# Patient Record
Sex: FEMALE | Race: WHITE | Employment: FULL TIME | URBAN - METROPOLITAN AREA
[De-identification: names, ages, dates, MRNs, and addresses within clinical notes are randomized per-mention and may not be internally consistent; named-entity substitution may affect disease eponyms.]

---

## 2017-12-12 ENCOUNTER — APPOINTMENT (OUTPATIENT)
Dept: MRI IMAGING | Age: 32
End: 2017-12-12
Payer: COMMERCIAL

## 2017-12-12 ENCOUNTER — APPOINTMENT (OUTPATIENT)
Dept: CT IMAGING | Age: 32
End: 2017-12-12
Payer: COMMERCIAL

## 2017-12-12 ENCOUNTER — HOSPITAL ENCOUNTER (OUTPATIENT)
Age: 32
Setting detail: OBSERVATION
Discharge: HOME | End: 2017-12-13
Attending: EMERGENCY MEDICINE | Admitting: EMERGENCY MEDICINE
Payer: COMMERCIAL

## 2017-12-12 DIAGNOSIS — R51.9 ACUTE NONINTRACTABLE HEADACHE, UNSPECIFIED HEADACHE TYPE: Primary | ICD-10-CM

## 2017-12-12 DIAGNOSIS — I95.9 HYPOTENSION, UNSPECIFIED HYPOTENSION TYPE: ICD-10-CM

## 2017-12-12 DIAGNOSIS — R79.89 INCREASED LACTIC ACID LEVEL: ICD-10-CM

## 2017-12-12 LAB
ABSOLUTE EOS #: 0.04 K/UL (ref 0–0.44)
ABSOLUTE IMMATURE GRANULOCYTE: <0.03 K/UL (ref 0–0.3)
ABSOLUTE LYMPH #: 1.75 K/UL (ref 1.1–3.7)
ABSOLUTE MONO #: 0.36 K/UL (ref 0.1–1.2)
ANION GAP SERPL CALCULATED.3IONS-SCNC: 12 MMOL/L (ref 9–17)
BASOPHILS # BLD: 1 % (ref 0–2)
BASOPHILS ABSOLUTE: 0.03 K/UL (ref 0–0.2)
BUN BLDV-MCNC: 9 MG/DL (ref 6–20)
BUN/CREAT BLD: NORMAL (ref 9–20)
CALCIUM SERPL-MCNC: 8.6 MG/DL (ref 8.6–10.4)
CHLORIDE BLD-SCNC: 102 MMOL/L (ref 98–107)
CO2: 25 MMOL/L (ref 20–31)
CREAT SERPL-MCNC: 0.51 MG/DL (ref 0.5–0.9)
DIFFERENTIAL TYPE: NORMAL
EOSINOPHILS RELATIVE PERCENT: 1 % (ref 1–4)
GFR AFRICAN AMERICAN: >60 ML/MIN
GFR NON-AFRICAN AMERICAN: >60 ML/MIN
GFR SERPL CREATININE-BSD FRML MDRD: NORMAL ML/MIN/{1.73_M2}
GFR SERPL CREATININE-BSD FRML MDRD: NORMAL ML/MIN/{1.73_M2}
GLUCOSE BLD-MCNC: 93 MG/DL (ref 70–99)
HCT VFR BLD CALC: 39.6 % (ref 36.3–47.1)
HEMOGLOBIN: 13 G/DL (ref 11.9–15.1)
IMMATURE GRANULOCYTES: 0 %
INR BLD: 0.9
LYMPHOCYTES # BLD: 39 % (ref 24–43)
MCH RBC QN AUTO: 29.7 PG (ref 25.2–33.5)
MCHC RBC AUTO-ENTMCNC: 32.8 G/DL (ref 28.4–34.8)
MCV RBC AUTO: 90.4 FL (ref 82.6–102.9)
MONOCYTES # BLD: 8 % (ref 3–12)
PARTIAL THROMBOPLASTIN TIME: 20.4 SEC (ref 21.3–31.3)
PDW BLD-RTO: 12.7 % (ref 11.8–14.4)
PLATELET # BLD: 179 K/UL (ref 138–453)
PLATELET ESTIMATE: NORMAL
PMV BLD AUTO: 11 FL (ref 8.1–13.5)
POC LACTIC ACID: 0.9 MMOL/L (ref 0.56–1.39)
POC LACTIC ACID: 3.01 MMOL/L (ref 0.56–1.39)
POTASSIUM SERPL-SCNC: 4 MMOL/L (ref 3.7–5.3)
PROTHROMBIN TIME: 9.9 SEC (ref 9.4–12.6)
RBC # BLD: 4.38 M/UL (ref 3.95–5.11)
RBC # BLD: NORMAL 10*6/UL
SEG NEUTROPHILS: 51 % (ref 36–65)
SEGMENTED NEUTROPHILS ABSOLUTE COUNT: 2.27 K/UL (ref 1.5–8.1)
SODIUM BLD-SCNC: 139 MMOL/L (ref 135–144)
TSH SERPL DL<=0.05 MIU/L-ACNC: 4.17 MIU/L (ref 0.3–5)
WBC # BLD: 4.5 K/UL (ref 3.5–11.3)
WBC # BLD: NORMAL 10*3/UL

## 2017-12-12 PROCEDURE — 70546 MR ANGIOGRAPH HEAD W/O&W/DYE: CPT

## 2017-12-12 PROCEDURE — 2580000003 HC RX 258: Performed by: EMERGENCY MEDICINE

## 2017-12-12 PROCEDURE — 85730 THROMBOPLASTIN TIME PARTIAL: CPT

## 2017-12-12 PROCEDURE — 96365 THER/PROPH/DIAG IV INF INIT: CPT

## 2017-12-12 PROCEDURE — 99219 PR INITIAL OBSERVATION CARE/DAY 50 MINUTES: CPT | Performed by: PSYCHIATRY & NEUROLOGY

## 2017-12-12 PROCEDURE — 6370000000 HC RX 637 (ALT 250 FOR IP): Performed by: NURSE PRACTITIONER

## 2017-12-12 PROCEDURE — 83605 ASSAY OF LACTIC ACID: CPT

## 2017-12-12 PROCEDURE — 96372 THER/PROPH/DIAG INJ SC/IM: CPT

## 2017-12-12 PROCEDURE — 70551 MRI BRAIN STEM W/O DYE: CPT

## 2017-12-12 PROCEDURE — 99284 EMERGENCY DEPT VISIT MOD MDM: CPT

## 2017-12-12 PROCEDURE — 84443 ASSAY THYROID STIM HORMONE: CPT

## 2017-12-12 PROCEDURE — 96375 TX/PRO/DX INJ NEW DRUG ADDON: CPT

## 2017-12-12 PROCEDURE — 96376 TX/PRO/DX INJ SAME DRUG ADON: CPT

## 2017-12-12 PROCEDURE — 6360000002 HC RX W HCPCS: Performed by: EMERGENCY MEDICINE

## 2017-12-12 PROCEDURE — G0378 HOSPITAL OBSERVATION PER HR: HCPCS

## 2017-12-12 PROCEDURE — 80048 BASIC METABOLIC PNL TOTAL CA: CPT

## 2017-12-12 PROCEDURE — A9579 GAD-BASE MR CONTRAST NOS,1ML: HCPCS | Performed by: EMERGENCY MEDICINE

## 2017-12-12 PROCEDURE — 96366 THER/PROPH/DIAG IV INF ADDON: CPT

## 2017-12-12 PROCEDURE — 6360000004 HC RX CONTRAST MEDICATION: Performed by: EMERGENCY MEDICINE

## 2017-12-12 PROCEDURE — 6360000002 HC RX W HCPCS: Performed by: STUDENT IN AN ORGANIZED HEALTH CARE EDUCATION/TRAINING PROGRAM

## 2017-12-12 PROCEDURE — 6370000000 HC RX 637 (ALT 250 FOR IP): Performed by: EMERGENCY MEDICINE

## 2017-12-12 PROCEDURE — 70450 CT HEAD/BRAIN W/O DYE: CPT

## 2017-12-12 PROCEDURE — 85610 PROTHROMBIN TIME: CPT

## 2017-12-12 PROCEDURE — 2580000003 HC RX 258: Performed by: STUDENT IN AN ORGANIZED HEALTH CARE EDUCATION/TRAINING PROGRAM

## 2017-12-12 PROCEDURE — 85025 COMPLETE CBC W/AUTO DIFF WBC: CPT

## 2017-12-12 RX ORDER — DEXAMETHASONE SODIUM PHOSPHATE 10 MG/ML
10 INJECTION INTRAMUSCULAR; INTRAVENOUS ONCE
Status: COMPLETED | OUTPATIENT
Start: 2017-12-12 | End: 2017-12-12

## 2017-12-12 RX ORDER — KETOROLAC TROMETHAMINE 15 MG/ML
15 INJECTION, SOLUTION INTRAMUSCULAR; INTRAVENOUS EVERY 6 HOURS
Status: DISPENSED | OUTPATIENT
Start: 2017-12-12 | End: 2017-12-13

## 2017-12-12 RX ORDER — MAGNESIUM SULFATE 1 G/100ML
1 INJECTION INTRAVENOUS
Status: COMPLETED | OUTPATIENT
Start: 2017-12-12 | End: 2017-12-12

## 2017-12-12 RX ORDER — AMITRIPTYLINE HYDROCHLORIDE 25 MG/1
25 TABLET, FILM COATED ORAL NIGHTLY
Status: DISCONTINUED | OUTPATIENT
Start: 2017-12-19 | End: 2017-12-13 | Stop reason: HOSPADM

## 2017-12-12 RX ORDER — METOCLOPRAMIDE HYDROCHLORIDE 5 MG/ML
10 INJECTION INTRAMUSCULAR; INTRAVENOUS EVERY 6 HOURS PRN
Status: DISCONTINUED | OUTPATIENT
Start: 2017-12-12 | End: 2017-12-13 | Stop reason: HOSPADM

## 2017-12-12 RX ORDER — DIPHENHYDRAMINE HYDROCHLORIDE 50 MG/ML
25 INJECTION INTRAMUSCULAR; INTRAVENOUS ONCE
Status: COMPLETED | OUTPATIENT
Start: 2017-12-12 | End: 2017-12-12

## 2017-12-12 RX ORDER — SODIUM CHLORIDE 0.9 % (FLUSH) 0.9 %
10 SYRINGE (ML) INJECTION EVERY 12 HOURS SCHEDULED
Status: DISCONTINUED | OUTPATIENT
Start: 2017-12-12 | End: 2017-12-13 | Stop reason: HOSPADM

## 2017-12-12 RX ORDER — SODIUM CHLORIDE 0.9 % (FLUSH) 0.9 %
10 SYRINGE (ML) INJECTION PRN
Status: DISCONTINUED | OUTPATIENT
Start: 2017-12-12 | End: 2017-12-13 | Stop reason: HOSPADM

## 2017-12-12 RX ORDER — METOCLOPRAMIDE HYDROCHLORIDE 5 MG/ML
10 INJECTION INTRAMUSCULAR; INTRAVENOUS ONCE
Status: COMPLETED | OUTPATIENT
Start: 2017-12-12 | End: 2017-12-12

## 2017-12-12 RX ORDER — LEVOTHYROXINE SODIUM 0.05 MG/1
50 TABLET ORAL DAILY
Status: DISCONTINUED | OUTPATIENT
Start: 2017-12-12 | End: 2017-12-13 | Stop reason: HOSPADM

## 2017-12-12 RX ORDER — 0.9 % SODIUM CHLORIDE 0.9 %
1000 INTRAVENOUS SOLUTION INTRAVENOUS ONCE
Status: COMPLETED | OUTPATIENT
Start: 2017-12-12 | End: 2017-12-12

## 2017-12-12 RX ORDER — SUMATRIPTAN 50 MG/1
50 TABLET, FILM COATED ORAL
Status: DISCONTINUED | OUTPATIENT
Start: 2017-12-12 | End: 2017-12-13 | Stop reason: HOSPADM

## 2017-12-12 RX ORDER — LORAZEPAM 2 MG/ML
1 INJECTION INTRAMUSCULAR ONCE
Status: COMPLETED | OUTPATIENT
Start: 2017-12-12 | End: 2017-12-12

## 2017-12-12 RX ORDER — SUMATRIPTAN 6 MG/.5ML
6 INJECTION, SOLUTION SUBCUTANEOUS ONCE
Status: COMPLETED | OUTPATIENT
Start: 2017-12-12 | End: 2017-12-12

## 2017-12-12 RX ORDER — ONDANSETRON 4 MG/1
4 TABLET, FILM COATED ORAL EVERY 8 HOURS PRN
Status: DISCONTINUED | OUTPATIENT
Start: 2017-12-12 | End: 2017-12-13 | Stop reason: HOSPADM

## 2017-12-12 RX ORDER — AMITRIPTYLINE HYDROCHLORIDE 25 MG/1
12.5 TABLET, FILM COATED ORAL NIGHTLY
Status: DISCONTINUED | OUTPATIENT
Start: 2017-12-12 | End: 2017-12-13 | Stop reason: HOSPADM

## 2017-12-12 RX ORDER — KETOROLAC TROMETHAMINE 15 MG/ML
15 INJECTION, SOLUTION INTRAMUSCULAR; INTRAVENOUS EVERY 6 HOURS PRN
Status: DISCONTINUED | OUTPATIENT
Start: 2017-12-12 | End: 2017-12-13 | Stop reason: HOSPADM

## 2017-12-12 RX ORDER — DIPHENHYDRAMINE HYDROCHLORIDE 50 MG/ML
25 INJECTION INTRAMUSCULAR; INTRAVENOUS EVERY 6 HOURS PRN
Status: DISCONTINUED | OUTPATIENT
Start: 2017-12-12 | End: 2017-12-13 | Stop reason: HOSPADM

## 2017-12-12 RX ORDER — METHYLPREDNISOLONE SODIUM SUCCINATE 40 MG/ML
40 INJECTION, POWDER, LYOPHILIZED, FOR SOLUTION INTRAMUSCULAR; INTRAVENOUS EVERY 6 HOURS
Status: DISCONTINUED | OUTPATIENT
Start: 2017-12-12 | End: 2017-12-13 | Stop reason: HOSPADM

## 2017-12-12 RX ORDER — SODIUM CHLORIDE 9 MG/ML
INJECTION, SOLUTION INTRAVENOUS CONTINUOUS
Status: DISCONTINUED | OUTPATIENT
Start: 2017-12-12 | End: 2017-12-13 | Stop reason: HOSPADM

## 2017-12-12 RX ORDER — SODIUM CHLORIDE 0.9 % (FLUSH) 0.9 %
10 SYRINGE (ML) INJECTION 2 TIMES DAILY
Status: DISCONTINUED | OUTPATIENT
Start: 2017-12-12 | End: 2017-12-13 | Stop reason: HOSPADM

## 2017-12-12 RX ORDER — CITALOPRAM 20 MG/1
20 TABLET ORAL DAILY
Status: DISCONTINUED | OUTPATIENT
Start: 2017-12-12 | End: 2017-12-13 | Stop reason: HOSPADM

## 2017-12-12 RX ORDER — ACETAMINOPHEN 325 MG/1
650 TABLET ORAL EVERY 4 HOURS PRN
Status: DISCONTINUED | OUTPATIENT
Start: 2017-12-12 | End: 2017-12-13 | Stop reason: HOSPADM

## 2017-12-12 RX ADMIN — MAGNESIUM SULFATE HEPTAHYDRATE 1 G: 1 INJECTION, SOLUTION INTRAVENOUS at 10:01

## 2017-12-12 RX ADMIN — PROCHLORPERAZINE EDISYLATE 10 MG: 5 INJECTION INTRAMUSCULAR; INTRAVENOUS at 08:33

## 2017-12-12 RX ADMIN — MAGNESIUM SULFATE HEPTAHYDRATE 1 G: 1 INJECTION, SOLUTION INTRAVENOUS at 08:36

## 2017-12-12 RX ADMIN — AMITRIPTYLINE HYDROCHLORIDE 12.5 MG: 25 TABLET, FILM COATED ORAL at 22:43

## 2017-12-12 RX ADMIN — DIPHENHYDRAMINE HYDROCHLORIDE 25 MG: 50 INJECTION, SOLUTION INTRAMUSCULAR; INTRAVENOUS at 08:34

## 2017-12-12 RX ADMIN — GADOPENTETATE DIMEGLUMINE 11 ML: 469.01 INJECTION INTRAVENOUS at 12:24

## 2017-12-12 RX ADMIN — SODIUM CHLORIDE 1000 ML: 9 INJECTION, SOLUTION INTRAVENOUS at 08:41

## 2017-12-12 RX ADMIN — SODIUM CHLORIDE 1000 ML: 9 INJECTION, SOLUTION INTRAVENOUS at 06:34

## 2017-12-12 RX ADMIN — Medication 10 ML: at 20:33

## 2017-12-12 RX ADMIN — METHYLPREDNISOLONE SODIUM SUCCINATE 40 MG: 40 INJECTION, POWDER, FOR SOLUTION INTRAMUSCULAR; INTRAVENOUS at 20:32

## 2017-12-12 RX ADMIN — METOCLOPRAMIDE 10 MG: 5 INJECTION, SOLUTION INTRAMUSCULAR; INTRAVENOUS at 06:33

## 2017-12-12 RX ADMIN — SUMATRIPTAN SUCCINATE 6 MG: 6 INJECTION SUBCUTANEOUS at 06:34

## 2017-12-12 RX ADMIN — DEXAMETHASONE SODIUM PHOSPHATE 10 MG: 10 INJECTION INTRAMUSCULAR; INTRAVENOUS at 08:33

## 2017-12-12 RX ADMIN — DIPHENHYDRAMINE HYDROCHLORIDE 25 MG: 50 INJECTION, SOLUTION INTRAMUSCULAR; INTRAVENOUS at 06:34

## 2017-12-12 RX ADMIN — LORAZEPAM 1 MG: 2 INJECTION INTRAMUSCULAR at 10:51

## 2017-12-12 RX ADMIN — CITALOPRAM 20 MG: 20 TABLET, FILM COATED ORAL at 22:43

## 2017-12-12 RX ADMIN — SODIUM CHLORIDE: 9 INJECTION, SOLUTION INTRAVENOUS at 20:32

## 2017-12-12 ASSESSMENT — PAIN SCALES - GENERAL
PAINLEVEL_OUTOF10: 10
PAINLEVEL_OUTOF10: 0

## 2017-12-12 ASSESSMENT — VISUAL ACUITY
OS: 20/25
OD: 20/20
OU: 20/20

## 2017-12-12 ASSESSMENT — ENCOUNTER SYMPTOMS
EYE ITCHING: 0
RHINORRHEA: 0
DIARRHEA: 0
EYE REDNESS: 0
ABDOMINAL PAIN: 0
VOMITING: 0
NAUSEA: 0
BACK PAIN: 0
COUGH: 0
SHORTNESS OF BREATH: 0

## 2017-12-12 ASSESSMENT — PAIN DESCRIPTION - PAIN TYPE: TYPE: ACUTE PAIN

## 2017-12-12 ASSESSMENT — PAIN DESCRIPTION - LOCATION: LOCATION: HEAD

## 2017-12-12 NOTE — ED NOTES
Pt states migraine is slightly subsiding at this time. NAD noted. Pt alert an oriented.  Will continue to monitor      Bernarda Hebert RN  12/12/17 8020

## 2017-12-12 NOTE — ED NOTES
Pt to ED with c/o migraine since last Wednesday with no relief, pt states taking tylenol. Pt is alert and oriented, pt states now she is nauseas so she decided to come in. Pt states sensitive to light and sound. No emesis noted.  Will continue to monitor      Lori Rosenbaum RN  12/12/17 6879

## 2017-12-12 NOTE — ED PROVIDER NOTES
[facility] ED     Emergency Department     Faculty Attestation        I performed a history and physical examination of the patient and discussed management with the resident. I reviewed the residents note and agree with the documented findings and plan of care. Any areas of disagreement are noted on the chart. I was personally present for the key portions of any procedures. I have documented in the chart those procedures where I was not present during the key portions. I have reviewed the emergency nurses triage note. I agree with the chief complaint, past medical history, past surgical history, allergies, medications, social and family history as documented unless otherwise noted below. For mid-level providers such as nurse practitioners as well as physicians assistants:    I have personally seen and evaluated the patient. I find the patient's history and physical exam are consistent with NP/PA documentation. I agree with the care provided, treatment rendered, disposition, & follow-up plan. Additional findings are as noted. Vital Signs: BP (!) 86/57   Pulse 90   Temp 97.7 °F (36.5 °C) (Oral)   Resp 14   Ht 5' 3\" (1.6 m)   Wt 125 lb (56.7 kg)   LMP 12/08/2017 (Approximate)   SpO2 100%   BMI 22.14 kg/m²   PCP:  Ermelinda Mittal    Pertinent Comments:     Patient presents emergency department for evaluation of a headache it's been present for the past 6-7 days. As gradual in onset and slowly worsened. There is photophobia. She weighs of nausea as well. She has no history of migraines or headaches. There is a family history of aneurysms. She denies any anticoagulation use. There is no head pain or trauma. On exam she appears uncomfortable. She has no focal neurological deficits to suggest stroke. Her neck is soft and supple no meningeal signs.   We'll treat with migraine cocktail, fluids, CT imaging the brain,

## 2017-12-12 NOTE — CONSULTS
NEUROLOGY INPATIENT CONSULT NOTE    12/12/2017         Anali Panda is a  33 y.o. female admitted on 12/12/2017 with  No admission diagnoses are documented for this encounter. History is obtained mostly from the patient and the medical record and from the caregivers. Chart is reviewed and patient is examined. Briefly, this is a  33 y.o. right handed female admitted on 12/12/2017 with new onset headaches x 1 week. Throbbing headaches on left frontal region of head  With no relief on tylenol. Associated with aura as halos and waves, nausea and vomiting, photophobia and phonophobia. Has weakness all over the body. Hx of tingling and numbness in hands and arm. No hx of stroke or seizures in past.     Family History of migraine headaches. No current facility-administered medications on file prior to encounter. Current Outpatient Prescriptions on File Prior to Encounter   Medication Sig Dispense Refill    citalopram (CELEXA) 20 MG tablet       levothyroxine (SYNTHROID) 50 MCG tablet Take 50 mcg by mouth Daily. Allergies: Kasey Marrufo has No Known Allergies. Past Medical History:   Diagnosis Date    Cervical shortening     Hashimoto's disease     Hypoactive thyroid     Pap smear of cervix with ASCUS, cannot exclude HGSIL 3/9/10    last wnl    Pregnant     24 WEEKS       Past Surgical History:   Procedure Laterality Date    CERVICAL CERCLAGE  4-11-15    DILATION AND CURETTAGE      x 2    LEEP  06/21/2010    JUSTINA 2 and 3, HGSIL    OTHER SURGICAL HISTORY  10/1/15    Nexplanon insertion     OTHER SURGICAL HISTORY  10/19/15    skin bx pubic area, rt midline-pigmented seborrheic keratosis     Social History: Kasey Marrufo  reports that she has been smoking Cigarettes. She has a 6.50 pack-year smoking history. She has never used smokeless tobacco. She reports that she drinks alcohol. She reports that she does not use drugs.     Family History   Problem Relation Age of Onset    Thyroid Disease Mother     COPD Father     Emphysema Father     Other Father      double lung transplant    COPD Paternal Grandmother     Cancer Paternal Grandfather      Lung CA       Current Medications:     magnesium sulfate  1 g Intravenous Q1H     PRN Meds include:     ROS:   Constitutional Negative for fever and chills, positive for headaches   HEENT Negative for ear discharge, ear pain, nosebleed   Eyes Negative for photophobia, pain and discharge   Respiratory Negative for hemoptysis and sputum   Cardiovascular Negative for orthopnea, claudication and PND   Gastrointestinal Positive for  nausea and vomiting   Musculoskeletal Negative for joint pain, negative for myalgia   Skin Negative for rash or itching   Endo/heme/allergies Negative for polydipsia, environmental allergy   Psychiatric Negative for suicidal ideation.   Patient is not anxious           Objective:   BP (!) 97/40   Pulse 90   Temp 97.7 °F (36.5 °C) (Oral)   Resp 14   Ht 5' 3\" (1.6 m)   Wt 125 lb (56.7 kg)   LMP 12/08/2017 (Approximate)   SpO2 100%   BMI 22.14 kg/m²     Blood pressure range: Systolic (09YFM), ZLY:41 , Min:86 , ERI:01   ; Diastolic (57AKO), NLP:84, Min:40, Max:57      Continuous infusions:      ON EXAMINATION:  GENERAL  Appears comfortable and in milddistress   HEENT  NC/ AT   NECK  Supple and no bruits heard   Cardiovascular  S1, S2 heard; radial pulse intact   MENTAL STATUS:  Alert, oriented, intact memory, no confusion, normal speech, normal language, no hallucination or delusion   CRANIAL NERVES: II     -       Pupils reactive b/l, Visual fields intact to confrontation  III,IV,VI -  EOMs full, no afferent defect, no                      AMBREEN, no ptosis  V     -     Normal facial sensation  VII    -     Normal facial symmetry  VIII   -     Intact hearing  IX,X -     Symmetrical palate  XI    -     Symmetrical shoulder shrug  XII   -     Midline tongue, no atrophy    MOTOR FUNCTION:  Normal bulk, normal tone, meds.  Also complains of paresthesias in first 3 digits of both hands with nocturnal awakenings with pain and paresthesias. She also has been having few month history of paresthesias in upper extremities suggestive of entrapment neuropathy. CT head, MRI brain and MRV head were unremarkable. Patient refused spinal tap. Impression and Plan: Ms. Raf Rondon is a 33 y.o. 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 Thank you for consultation.       Latisha Ontiveros MD 12/12/2017 5:39 PM

## 2017-12-12 NOTE — ED PROVIDER NOTES
[facility] ED  Emergency Department Encounter  Emergency Medicine Resident     Pt Name: Talon Hay  MRN: [id number]  [place] 1985  Date of evaluation: 12/12/17  PCP:  [address] [place] [address]       Chief Complaint   Patient presents with    Migraine       HISTORY OF PRESENT ILLNESS  (Location/Symptom, Timing/Onset, Context/Setting, Quality, Duration, Modifying Factors, Severity.)      Talon Hay is a 33 y.o. female who presents with Acute onset left sided parietal sharp headache that has been slowly worsening since last week. Patient has never had a headache that is this bad before, never lasted this long. It is associated with nausea and vomiting. There is no blood or bile in the vomit. Associated with photophobia, phonophobia. She does not have a history of headaches, migraines. She has never been seen by neurologist, not had a head CT. She does not have sick contacts, fever, chills, rashes, joint pain, diarrhea. PAST MEDICAL / SURGICAL / SOCIAL / FAMILY HISTORY      has a past medical history of Cervical shortening; Hashimoto's disease; Hypoactive thyroid; Pap smear of cervix with ASCUS, cannot exclude HGSIL; and Pregnant. has a past surgical history that includes LEEP (06/21/2010); Dilation & curettage; Cervical Cerclage (4-11-15); other surgical history (10/1/15); and other surgical history (10/19/15). Social History     Social History    Marital status: Single     Spouse name: N/A    Number of children: N/A    Years of education: N/A     Occupational History    Not on file.      Social History Main Topics    Smoking status: Current Every Day Smoker     Packs/day: 0.50     Years: 13.00     Types: Cigarettes    Smokeless tobacco: Never Used    Alcohol use 0.0 oz/week      Comment: rarely but while pregnant    Drug use: No    Sexual activity: Yes     Partners: Male     Other Topics Concern    Not on file     Social History Narrative  No narrative on file       Family History   Problem Relation Age of Onset    Thyroid Disease Mother    Jessica Hensley COPD Father     Emphysema Father     Other Father      double lung transplant    COPD Paternal Grandmother     Cancer Paternal Grandfather      Lung CA       Allergies:  Review of patient's allergies indicates no known allergies. Home Medications:  Prior to Admission medications    Medication Sig Start Date End Date Taking? Authorizing Provider   citalopram (CELEXA) 20 MG tablet  9/17/15   Historical Provider, MD   levothyroxine (SYNTHROID) 50 MCG tablet Take 50 mcg by mouth Daily. Historical Provider, MD       REVIEW OF SYSTEMS    (2-9 systems for level 4, 10 or more for level 5)      Review of Systems   Constitutional: Negative for chills and fever. HENT: Negative for congestion and rhinorrhea. Eyes: Negative for redness and itching. Respiratory: Negative for cough and shortness of breath. Cardiovascular: Negative for chest pain, palpitations and leg swelling. Gastrointestinal: Negative for abdominal pain, diarrhea, nausea and vomiting. Genitourinary: Negative for dysuria and frequency. Musculoskeletal: Negative for back pain, joint swelling and myalgias. Skin: Negative for pallor and rash. Neurological: Positive for headaches. Negative for dizziness, weakness, light-headedness and numbness. PHYSICAL EXAM   (up to 7 for level 4, 8 or more for level 5)      INITIAL VITALS:   BP (!) 86/57   Pulse 90   Temp 97.7 °F (36.5 °C) (Oral)   Resp 14   Ht 5' 3\" (1.6 m)   Wt 125 lb (56.7 kg)   LMP 12/08/2017 (Approximate)   SpO2 100%   BMI 22.14 kg/m²     Physical Exam   Constitutional: She is oriented to person, place, and time. She appears well-developed and well-nourished. She appears distressed. HENT:   Head: Normocephalic and atraumatic. Eyes: EOM are normal. Pupils are equal, round, and reactive to light.    Cardiovascular: Normal rate, regular rhythm and normal initially, will reassess after first liter. Last documented blood pressure in system is low, 100s over 50s. Of note patient is also hypothyroid, we'll get a TSH. We'll get CBC, BMP, lactic acid as well. Do not feel strongly that this is meningitis at this point, does not have meningismus on exam.  No sick contacts, no rashes. RADIOLOGY:  None    EKG  None    All EKG's are interpreted by the Emergency Department Physician who either signs or Co-signs this chart in the absence of a cardiologist.    [address] [place] [address]:  [phone] at this time I'm going off service patient will be cared for by Dr. Chasity Powers. Outstanding tests include CBC, BMP, lactic acid, CT head, TSH. PROCEDURES:  None    CONSULTS:  None    CRITICAL CARE:  None    FINAL IMPRESSION      HA    DISPOSITION / PLAN     DISPOSITION     PATIENT REFERRED TO:  No follow-up provider specified.     DISCHARGE MEDICATIONS:  New Prescriptions    No medications on file       Althea Fuentes MD  Emergency Medicine Resident    (Please note that portions of this note were completed with a voice recognition program.  Efforts were made to edit the dictations but occasionally words are mis-transcribed.)       Althea Fuentes MD  12/12/17 3173

## 2017-12-12 NOTE — ED PROVIDER NOTES
Contrast    Result Date: 12/12/2017  EXAMINATION: MRI OF THE BRAIN WITHOUT CONTRAST  12/12/2017 12:25 pm TECHNIQUE: Multiplanar multisequence MRI of the brain was performed without the administration of intravenous contrast. COMPARISON: None. HISTORY: ORDERING SYSTEM PROVIDED HISTORY: headache central venous thrombosis Initial evaluation. FINDINGS: INTRACRANIAL STRUCTURES/VENTRICLES: There is no acute infarct. The ventricles and cisternal spaces are normal in size, shape, and configuration for the age of the patient. No areas of abnormal signal are identified in the brain parenchyma. There is no midline shift or mass effect. There are no areas of restricted diffusion. ORBITS: The visualized portion of the orbits demonstrate no acute abnormality. SINUSES:  The visualized paranasal sinuses and mastoid air cells are clear. Incidental note is made of mucous retention cyst or polyp in the right maxillary sinus. BONES/SOFT TISSUES: The bone marrow signal intensity appears normal. The craniocervical junction is normal in appearance. No acute intracranial abnormality. OUTSTANDING TASKS / RECOMMENDATIONS:    1. CTh, CBC, BMP, Lactic Acid, 2nd Bolus of Fluid, TSH     FINAL IMPRESSION:     1. Acute nonintractable headache, unspecified headache type    2. Hypotension, unspecified hypotension type    3.  Increased lactic acid level        DISPOSITION:         DISPOSITION:  []  Discharge   []  Transfer -    [x]  Admission -   ETU   []  Against Medical Advice   []  Eloped   FOLLOW-UP: [address]  [address] [facility]  [phone]           DISCHARGE MEDICATIONS: Current Discharge Medication List             Candy Zamora MD  Emergency Medicine Resident  [address] [facility]        Candy Zamora MD  Resident  12/12/17 5106

## 2017-12-13 VITALS
RESPIRATION RATE: 16 BRPM | HEIGHT: 63 IN | BODY MASS INDEX: 21.32 KG/M2 | HEART RATE: 97 BPM | DIASTOLIC BLOOD PRESSURE: 60 MMHG | TEMPERATURE: 98.4 F | SYSTOLIC BLOOD PRESSURE: 105 MMHG | OXYGEN SATURATION: 97 % | WEIGHT: 120.3 LBS

## 2017-12-13 PROCEDURE — 96376 TX/PRO/DX INJ SAME DRUG ADON: CPT

## 2017-12-13 PROCEDURE — 6370000000 HC RX 637 (ALT 250 FOR IP): Performed by: EMERGENCY MEDICINE

## 2017-12-13 PROCEDURE — 2580000003 HC RX 258: Performed by: EMERGENCY MEDICINE

## 2017-12-13 PROCEDURE — 6360000002 HC RX W HCPCS: Performed by: NURSE PRACTITIONER

## 2017-12-13 PROCEDURE — G0378 HOSPITAL OBSERVATION PER HR: HCPCS

## 2017-12-13 PROCEDURE — 6360000002 HC RX W HCPCS: Performed by: EMERGENCY MEDICINE

## 2017-12-13 PROCEDURE — 96375 TX/PRO/DX INJ NEW DRUG ADDON: CPT

## 2017-12-13 PROCEDURE — 99225 PR SBSQ OBSERVATION CARE/DAY 25 MINUTES: CPT | Performed by: NURSE PRACTITIONER

## 2017-12-13 RX ORDER — SUMATRIPTAN 50 MG/1
TABLET, FILM COATED ORAL
Qty: 9 TABLET | Refills: 1 | Status: SHIPPED | OUTPATIENT
Start: 2017-12-13 | End: 2018-10-15

## 2017-12-13 RX ORDER — PREDNISONE 20 MG/1
TABLET ORAL
Qty: 18 TABLET | Refills: 0 | Status: SHIPPED | OUTPATIENT
Start: 2017-12-14 | End: 2018-10-15 | Stop reason: ALTCHOICE

## 2017-12-13 RX ORDER — AMITRIPTYLINE HYDROCHLORIDE 25 MG/1
TABLET, FILM COATED ORAL
Qty: 33 TABLET | Refills: 3 | Status: SHIPPED | OUTPATIENT
Start: 2017-12-13 | End: 2018-10-15

## 2017-12-13 RX ADMIN — SODIUM CHLORIDE: 9 INJECTION, SOLUTION INTRAVENOUS at 09:22

## 2017-12-13 RX ADMIN — LEVOTHYROXINE SODIUM 50 MCG: 50 TABLET ORAL at 09:31

## 2017-12-13 RX ADMIN — METHYLPREDNISOLONE SODIUM SUCCINATE 40 MG: 40 INJECTION, POWDER, FOR SOLUTION INTRAMUSCULAR; INTRAVENOUS at 16:19

## 2017-12-13 RX ADMIN — METHYLPREDNISOLONE SODIUM SUCCINATE 40 MG: 40 INJECTION, POWDER, FOR SOLUTION INTRAMUSCULAR; INTRAVENOUS at 09:35

## 2017-12-13 RX ADMIN — KETOROLAC TROMETHAMINE 15 MG: 15 INJECTION, SOLUTION INTRAMUSCULAR; INTRAVENOUS at 09:35

## 2017-12-13 RX ADMIN — CITALOPRAM 20 MG: 20 TABLET, FILM COATED ORAL at 09:30

## 2017-12-13 RX ADMIN — METHYLPREDNISOLONE SODIUM SUCCINATE 40 MG: 40 INJECTION, POWDER, FOR SOLUTION INTRAMUSCULAR; INTRAVENOUS at 02:31

## 2017-12-13 ASSESSMENT — PAIN SCALES - GENERAL
PAINLEVEL_OUTOF10: 0
PAINLEVEL_OUTOF10: 0

## 2017-12-13 NOTE — H&P
[facility]  CDU / OBSERVATION eNCOUnter  Resident Note     Pt Name: Rabia Barr  MRN: [id number]  [place] 1985  Date of evaluation: 12/13/17  Patient's PCP is :  [address] [place] [address]       Chief Complaint   Patient presents with    Migraine         HISTORY OF PRESENT ILLNESS    Rabia Barr is a 33 y.o. female who presents With acute onset left-sided parietal headache, which she describes as throbbing in quality. Headaches have been associated with nausea, vomiting, photophobia and phonophobia. Patient denies prior history of headaches. Patient also complaining of generalized visual changes. Patient denies focal neuro deficits including denying numbness, tingling, weakness in extremities. Patient received magnesium in the emergency department. Patient did not have improvement while in the emergency department. Emergency medicine physician contacted neurology who recommending ordering an MRI and MRV. She had a negative CT head workup. Patient refused lumbar puncture as offered by emergency physician for workup for possible sentinel subarachnoid hemorrhage. Patient was admitted to observation unit for further symptomatic control and evaluation by neurology. Patient was seen by myself today in the observation unit floor. Patient stating that her headache is near 100% improved. Patient continues to deny focal neuro symptoms. We'll await final recommendation per neurology.     Location/Symptom: Left-sided parietal headache  Timing/Onset: Several-day history, worsening  Provocation: Photo and phonophobia  Quality: Throbbing  Radiation: None  Severity: Severe  Timing/Duration: Worsening since onset    Modifying Factors: None    REVIEW OF SYSTEMS       General ROS - No fevers, No malaise   Ophthalmic ROS - No discharge, No changes in vision  ENT ROS -  No sore throat, No rhinorrhea,   Respiratory ROS - no shortness of breath, no cough, no wheezing  Cardiovascular ROS - No chest pain, no dyspnea on exertion  Gastrointestinal ROS - No abdominal pain, no nausea or vomiting, no change in bowel habits, no black or bloody stools  Genito-Urinary ROS - No dysuria, trouble voiding, or hematuria  Musculoskeletal ROS - No myalgias, No arthalgias  Neurological ROS - Positive headache, no dizziness/lightheadedness, No focal weakness, no loss of sensation  Dermatological ROS - No lesions, No rash   Psych-no agitation    (PQRS) Advance directives on face sheet per hospital policy. No change unless specifically mentioned in chart    PAST MEDICAL HISTORY    has a past medical history of Cervical shortening; Hashimoto's disease; Hypoactive thyroid; Pap smear of cervix with ASCUS, cannot exclude HGSIL; and Pregnant. I have reviewed the past medical history with the patient and it is pertinent to this complaint. SURGICAL HISTORY      has a past surgical history that includes LEEP (06/21/2010); Dilation & curettage; Cervical Cerclage (4-11-15); other surgical history (10/1/15); and other surgical history (10/19/15).   I have reviewed and agree with Surgical History entered    CURRENT MEDICATIONS         sodium chloride flush 0.9 % injection 10 mL BID   sodium chloride flush 0.9 % injection 10 mL 2 times per day   sodium chloride flush 0.9 % injection 10 mL PRN   citalopram (CELEXA) tablet 20 mg Daily   levothyroxine (SYNTHROID) tablet 50 mcg Daily   0.9 % sodium chloride infusion Continuous   sodium chloride flush 0.9 % injection 10 mL 2 times per day   sodium chloride flush 0.9 % injection 10 mL PRN   acetaminophen (TYLENOL) tablet 650 mg Q4H PRN   ondansetron (ZOFRAN) tablet 4 mg Q8H PRN   enoxaparin (LOVENOX) injection 40 mg Daily   ketorolac (TORADOL) injection 15 mg Q6H PRN   methylPREDNISolone sodium (SOLU-MEDROL) injection 40 mg Q6H   prochlorperazine (COMPAZINE) injection 10 mg Q6H PRN   diphenhydrAMINE (BENADRYL) injection 25 mg Q6H PRN   metoclopramide non-distended with normal active bowel sounds   NEUROLOGIC:  MAEx4, no focal sensory or motor deficits   MUSCULOSKELETAL: no clubbing, cyanosis or edema   SKIN: no rash or wounds       DIFFERENTIAL DIAGNOSIS/MDM:   Headache:  DDX: SAH, subdural hematoma, epidural, intracerebral, meningitis, encephalitis, migraine, tension, cluster, sinusitis, dental, stroke, encephalitis, abscess, CNS mass, increased ICP, venous thrombosis, carbon monoxide poisoning, acute angle closure glaucoma, temporal arteritis, idiopathic intracranial hypertension        DIAGNOSTIC RESULTS       RADIOLOGY:   I directly visualized the following  images and reviewed the radiologist interpretations:    Ct Head Wo Contrast    Result Date: 12/12/2017  EXAMINATION: CT OF THE HEAD WITHOUT CONTRAST  12/12/2017 8:09 am TECHNIQUE: CT of the head was performed without the administration of intravenous contrast. Dose modulation, iterative reconstruction, and/or weight based adjustment of the mA/kV was utilized to reduce the radiation dose to as low as reasonably achievable. COMPARISON: CT head dated 11/18/2008 HISTORY: ORDERING SYSTEM PROVIDED HISTORY: HA, new onset FINDINGS: BRAIN/VENTRICLES: There is no acute intracranial hemorrhage, mass effect or midline shift. No abnormal extra-axial fluid collection. The gray-white differentiation is maintained without evidence of an acute infarct. There is no evidence of hydrocephalus. ORBITS: The visualized portion of the orbits demonstrate no acute abnormality. SINUSES: The visualized paranasal sinuses and mastoid air cells demonstrate no acute abnormality. SOFT TISSUES/SKULL:  No acute abnormality of the visualized skull or soft tissues. No acute intracranial abnormality.      Mrv Head W Wo Contrast    Result Date: 12/12/2017  EXAMINATION: MRA/MRV OF THE HEAD WITH AND WITHOUT CONTRAST  12/12/2017: TECHNIQUE: Multiplanar multisequence MRA/MRV of the head was performed with and without the administration of GAS INCLUDES CALC. BASE EXCESS, SO2   POC G4: LACTIC ACID,BLOOD GAS INCLUDES CALC. BASE EXCESS, SO2   LACTIC ACID,POINT OF CARE         CDU IMPRESSION / Emmanuel Stacy is a 33 y.o. female who presents with     1. Acute left sided/parietal throbbing headache, associated with nausea, vomiting, photophobia, phonophobia. Likely due to migraine headache. Patient stating that her headache has significantly improved from the time she was seen in the emergency department to my evaluation on the observation unit. She now rates her headache a 1 out of 10 in severity, appears to be in no acute distress. Patient is denying focal neuro deficits or visual changes at present.  -Further assessed with CT head/MRV/MRI which were negative for acute intracranial pathology  -Patient treated with Toradol, Reglan, amitriptyline  -Neurology consultation: Recommending discharge and follow-up with Dr. Edmundo Dotson in 6-8 weeks, continue Elavil, Imitrex, and prednisone at discharge. 2.  Acute on chronic hypotension, due to history of hypotension from unknown etiology.  -Treated with normal saline infusion, and monitoring.     IP CONSULT TO NEUROLOGY  IP CONSULT TO NEUROLOGY  IP CONSULT TO NEUROLOGY  · Further workup and evaluation   · Follow up recommendations     · Continue home meds, pain control   · Monitor vitals, labs, imaging     DISPO: pending consults and clinical improvement     CONSULTS:    IP CONSULT TO NEUROLOGY  IP CONSULT TO NEUROLOGY  IP CONSULT TO NEUROLOGY    PROCEDURES:  Not indicated       PATIENT REFERRED TO:    [address]  [address] [facility]  [phone]    Schedule an appointment as soon as possible for a visit in 3 days      Randi Hughes MD  [address]  [phone]    Schedule an appointment as soon as possible for a visit in 2 months        --  Sarah Velasquez,    Emergency Medicine Resident     This dictation was generated by voice recognition computer software. Although all attempts are made to edit the dictation for accuracy, there may be errors in the transcription that are not intended.

## 2017-12-13 NOTE — PROGRESS NOTES
CDU Daily Progress Note  Attending Physician       Pt Name: Jarrett Clark  MRN: [id number]  [place] 1985  Date of evaluation: 12/13/17    I performed a history and physical examination of the patient and discussed management with the resident. I reviewed the residents note and agree with the documented findings and plan of care. Any areas of disagreement are noted on the chart. I was personally present for the key portions of any procedures. I have documented in the chart those procedures where I was not present during the key portions. I have reviewed the emergency nurses triage note. I agree with the chief complaint, past medical history, past surgical history, allergies, medications, social and family history as documented unless otherwise noted below. Documentation of the HPI, Physical Exam and Medical Decision Making performed by medical students or scribes is based on my personal performance of the HPI, PE and MDM. For Physician Assistant/ Nurse Practitioner cases/documentation I have personally evaluated this patient and have completed at least one if not all key elements of the E/M (history, physical exam, and MDM). Additional findings are as noted. The Family History, Social History and Review of Systems are unchanged from the previous day. No significant events overnight. L parietal headache worse over last week, assoc w N/V, photo/phonophobia no bile/blood. CT head neg, MRI/MRV brain neg. Neuro consulted    Headache today is gone. Neurology following.  OK for discharge      Melody Kerns MD  Attending Physician  Critical Decision Unit

## 2017-12-13 NOTE — CARE COORDINATION
Case Management Initial Discharge Plan  Bobbi Asuncion Halsted,         Readmission Risk              Readmission Risk:        0       Age 73 or Greater:  0    Admitted from SNF or Requires Paid or Family Care:  0    Currently has CHF,COPD,ARF,CRI,or is on dialysis:  0    Takes more than 5 Prescription Medications:  0    Takes Digoxin,Insulin,Anticoagulants,Narcotics or ASA/Plavix:  [address] in Past 12 Months:  0    On Disability:  0    Patient Considers own Health:  0            Met with:patient to discuss discharge plans.    Information verified: address, contacts, phone number, , insurance Yes  PCP: Radha Ramos  Date of last visit: year ago but states is in good standings and can make appointment     Insurance Provider: [employer]     Discharge Planning  Current Residence:  Private Residence  Living Arrangements:  Spouse/Significant Other   Home has one stories/minimal  stairs to climb  Support Systems:  Family Members, Spouse/Significant Other, Parent, Friends/Neighbors  Current Services PTA:  None Supplier: none   Patient able to perform ADL's:Independent  DME used to aid ambulation prior to admission: none /during admission none     Potential Assistance Needed:  N/A    Pharmacy: [facility]    Potential Assistance Purchasing Medications:  No  Does patient want to participate in local refill/ meds to beds program?  No    Patient agreeable to home care: No  [place] of choice provided:  n/a      Type of Home Care Services:  None  Patient expects to be discharged to:  home    Prior SNF/Rehab Placement and Facility: no   Agreeable to SNF/Rehab: No  [place] of choice provided: n/a   Evaluation: n/a    Expected Discharge date:  17  Follow Up Appointment: Best Day/ Time: Thursday PM    Transportation provider: family   Transportation arrangements needed for discharge: No    Discharge Plan: Plan to discharge to home independently with family; has follow up Electronically signed by Gal Regalado RN on 12/13/17 at 10:54 AM

## 2017-12-13 NOTE — PROGRESS NOTES
Neurology Nurse Practitioner Progress Note      INTERVAL HISTORY: This is a 33 y.o.  female admitted 12/12/2017 for Headache [R51]. This is a follow-up neurology progress note. The patient was examined and the chart was reviewed. Discussed with the pt & RN. There were no acute events overnight. No new motor, sensory, visual or bulbar symptoms. HPI: Madeline Hanna is a 33 y.o. female with H/O Hashimoto's disease, who was admitted 12/12/2017 for Headache [R51]. Pt came in with c/o new onset severe throbbing L frontal headaches with visual aura (waves & halos), associated with nausea, vomiting, photo/phonophobia for the last 1 week. Also c/o on-going tingling & numbness in both arms & first 3 digits of bilateral hands for the past few months. Reported occasional nocturnal awakenings with pain & paresthesia in UEs.       sodium chloride flush  10 mL Intravenous BID    sodium chloride flush  10 mL Intravenous 2 times per day    citalopram  20 mg Oral Daily    levothyroxine  50 mcg Oral Daily    sodium chloride flush  10 mL Intravenous 2 times per day    enoxaparin  40 mg Subcutaneous Daily    methylPREDNISolone  40 mg Intravenous Q6H    ketorolac  15 mg Intravenous Q6H    amitriptyline  12.5 mg Oral Nightly    [START ON 12/19/2017] amitriptyline  25 mg Oral Nightly       Past Medical History:   Diagnosis Date    Cervical shortening     Hashimoto's disease     Hypoactive thyroid     Pap smear of cervix with ASCUS, cannot exclude HGSIL 3/9/10    last wnl    Pregnant     24 WEEKS       Past Surgical History:   Procedure Laterality Date    CERVICAL CERCLAGE  4-11-15    DILATION AND CURETTAGE      x 2    LEEP  06/21/2010    JUTSINA 2 and 3, HGSIL    OTHER SURGICAL HISTORY  10/1/15    Nexplanon insertion     OTHER SURGICAL HISTORY  10/19/15    skin bx pubic area, rt midline-pigmented seborrheic keratosis       PHYSICAL EXAM:      Blood pressure (!) 95/55, pulse 99, temperature 98.1 °F (36.7 °C), temperature source Oral, resp. rate 20, height 5' 3\" (1.6 m), weight 120 lb 4.8 oz (54.6 kg), last menstrual period 12/08/2017, SpO2 97 %, not currently breastfeeding. Neurological Examination:  Mental status   Alert and oriented; intact memory with no confusion, normal speech & language problems; no hallucinations or delusions   Cranial nerves   II - visual fields intact to confrontation                                        III, IV, VI  extra-ocular muscles full: no AMBREEN, no nystagmus, no ptosis                                                     V - normal facial sensation                                                        VII - normal facial symmetry                                                       VIII - intact hearing                                                                     IX, X - symmetrical palate                                                          XI - symmetrical shoulder shrug                                                 XII - midline tongue without atrophy or fasciculation   Motor function  Normal muscle bulk and tone; normal power 5/5, including fine motor movements   Sensory function Grossly intact   Cerebellar No visible involuntary movements or tremors   Reflex function Intact 2+ DTR and symmetric with no pathologic reflex or Babinski sign   Gait                  Not tested       DATA      Lab Results   Component Value Date    WBC 4.5 12/12/2017    HGB 13.0 12/12/2017    HCT 39.6 12/12/2017     12/12/2017    ALT 13 03/18/2015    AST 19 03/18/2015     12/12/2017    K 4.0 12/12/2017     12/12/2017    CREATININE 0.51 12/12/2017    BUN 9 12/12/2017    CO2 25 12/12/2017    TSH 4.17 12/12/2017    INR 0.9 12/12/2017         CT HEAD (12/12/2017): Unremarkable     MRI BRAIN (12/12/2017): Unremarkable     MRV HEAD (12/12/2017):  Unremarkable                         IMPRESSION & PLAN: 33 y.o.  female admitted  Intractable migrainous (with visual aura) &

## 2017-12-13 NOTE — PLAN OF CARE
Problem: Pain:  Goal: Pain level will decrease  Pain level will decrease   Outcome: Completed Date Met: 12/13/17    Goal: Control of acute pain  Control of acute pain   Outcome: Completed Date Met: 12/13/17    Goal: Control of chronic pain  Control of chronic pain   Outcome: Completed Date Met: 12/13/17

## 2017-12-13 NOTE — DISCHARGE SUMMARY
CDU Discharge Summary        Patient: Nereyda Sanchez  YOB: 1985    MRN: [id number]   Acct: [de-identified]    Primary Care Physician: Annie Lopez    Admit date:  12/12/2017 12/12/2017  5:57 AM  Discharge date:  12/13/2017 12/13/2017  4:39 PM     Discharge Diagnoses:   Acute left-sided/parietal throbbing headache associated nausea, vomiting, photophobia and phonophobia. Likely due to migraine headache. Further assessed with CT head/MRV/MRI, and neurology consultation. Patient was treated with Toradol, Reglan, amitriptyline. Patient will follow-up with  in 6-8 weeks. Acute on chronic hypotension, due to history of hypotension from unknown etiology. Patient was treated with continued monitoring, and normal saline infusion. Patient to follow-up with primary care provider for ongoing management. Discharge Medications:       Norman Posada   Home Medication Instructions WOV:[id number]    Printed on:12/13/17 3773   Medication Information                      amitriptyline (ELAVIL) 25 MG tablet  Take half tab (12.5 mg) nightly x 6 days, then take 1 tab nightly             citalopram (CELEXA) 20 MG tablet               levothyroxine (SYNTHROID) 50 MCG tablet  Take 50 mcg by mouth Daily. predniSONE (DELTASONE) 20 MG tablet  Starting from 12/14, take 3 tabs in the AM x 3 days. Then take 2 tablets x 3 days. Then take 1 tablet x 3 days. SUMAtriptan (IMITREX) 50 MG tablet  Take 1 tab only for severe headache. Can repeat after 2 hours. Max 2 tabs/day or 4 tabs/week.                  Diet:    general diet    Activity:  As tolerated    Follow-up:  Call today/tomorrow for a follow up appointment with Annie Lopez     Consultants: IP CONSULT TO NEUROLOGY  IP CONSULT TO NEUROLOGY  IP CONSULT TO NEUROLOGY    Procedures:      Diagnostic Test: Ct Head Wo Contrast    Result Date: 12/12/2017  EXAMINATION: CT OF THE HEAD WITHOUT CONTRAST  12/12/2017 stating that her headache is near 100% improved. Patient continues to deny focal neuro symptoms. We'll await final recommendation per neurology. Neurology recommending discharge and follow-up with Dr Adriana Kaur in 6-8 weeks. Patient to be discharged home on Elavil, Imitrex and prednisone taper. . Labs and imaging were followed daily. At time of discharge, Judy Osorio was tolerating a PO intake well, passing flatus, urinating adequately, ambulating and had adequate analgesia on oral pain medications. She is medically stable to be discharged. Clinical course has improved. I feel the patient can be safely discharged to home with outpatient follow up. Instructions have been given for the patient to return to the ED for any worsening of the symptoms, including but not limited to increased pain, shortness of breath, weakness, or any deterioration of their current condition . Disposition: Home    Condition: Good      Patient stable and ready for discharge home. I have discussed plan of care with patient and they are in understanding. They were instructed to read discharge paperwork. All of their questions and concerns were addressed.      Patient states that they understand the plan and agree with the plan      Time Spent: Farhad. Ronaldo Warren 70, DO  Emergency Medicine Resident Physician

## 2018-10-15 ENCOUNTER — OFFICE VISIT (OUTPATIENT)
Dept: OBGYN CLINIC | Age: 33
End: 2018-10-15
Payer: COMMERCIAL

## 2018-10-15 VITALS
DIASTOLIC BLOOD PRESSURE: 60 MMHG | WEIGHT: 127.2 LBS | SYSTOLIC BLOOD PRESSURE: 104 MMHG | BODY MASS INDEX: 22.54 KG/M2 | HEIGHT: 63 IN

## 2018-10-15 DIAGNOSIS — Z01.419 ENCOUNTER FOR GYNECOLOGICAL EXAMINATION: Primary | ICD-10-CM

## 2018-10-15 LAB — PAP SMEAR: NORMAL

## 2018-10-15 PROCEDURE — 99395 PREV VISIT EST AGE 18-39: CPT | Performed by: NURSE PRACTITIONER

## 2018-10-15 PROCEDURE — G8484 FLU IMMUNIZE NO ADMIN: HCPCS | Performed by: NURSE PRACTITIONER

## 2018-10-15 ASSESSMENT — ENCOUNTER SYMPTOMS
DIARRHEA: 0
ABDOMINAL PAIN: 0
CONSTIPATION: 0
COUGH: 0
SHORTNESS OF BREATH: 0
ABDOMINAL DISTENTION: 0
BACK PAIN: 0

## 2018-10-17 ENCOUNTER — PROCEDURE VISIT (OUTPATIENT)
Dept: OBGYN CLINIC | Age: 33
End: 2018-10-17
Payer: COMMERCIAL

## 2018-10-17 VITALS
WEIGHT: 127 LBS | SYSTOLIC BLOOD PRESSURE: 112 MMHG | DIASTOLIC BLOOD PRESSURE: 64 MMHG | HEIGHT: 63 IN | BODY MASS INDEX: 22.5 KG/M2

## 2018-10-17 DIAGNOSIS — Z30.46 NEXPLANON REMOVAL: Primary | ICD-10-CM

## 2018-10-17 PROCEDURE — 11982 REMOVE DRUG IMPLANT DEVICE: CPT | Performed by: NURSE PRACTITIONER

## 2018-10-17 NOTE — PROGRESS NOTES
10/01/2018, not currently breastfeeding. PROCEDURE:  The patient was positioned comfortably on our procedure table. She was consented earlier in the appointment and the procedure risk and complications were reviewed. A sterile prep and drape was completed and lidocaine for local anesthetic was utilized. The skin had a small incision just beyond the proximal tip of the insert and utilizing blunt dissection the stylet was grasped and removed. A sterile dressing and steri-strip was applied with a pressure wrap. The patient tolerated the procedure well. Formal restrictions were discussed in detail. She is to notify our office if any swelling, redness, temperature, or limb restriction or numbness. No baths, Pools or Lakes until Follow up. Showers are allowed in 36 hours. She may take Tylenol for any pain. Assessment:   Diagnosis Orders   1. Nexplanon removal  Remove drug implant device     Patient Active Problem List    Diagnosis Date Noted    S/P BMZ 4/11, 4/12 04/11/2015     Priority: High    Right carpal tunnel syndrome     Acute nonintractable headache 12/12/2017    Hypothyroid 02/11/2015     Synthroid 50 mcg/day    Labs have been normal throughout pregnancy. With last one 6/8/15.      H/O BERT (2009) 02/11/2015         Plan:  Return in about 1 month (around 11/17/2018).   Abstain  Barrier recs

## 2018-11-12 ENCOUNTER — INITIAL CONSULT (OUTPATIENT)
Dept: OBGYN CLINIC | Age: 33
End: 2018-11-12
Payer: COMMERCIAL

## 2018-11-12 VITALS
WEIGHT: 127 LBS | DIASTOLIC BLOOD PRESSURE: 70 MMHG | BODY MASS INDEX: 22.5 KG/M2 | HEIGHT: 63 IN | SYSTOLIC BLOOD PRESSURE: 116 MMHG

## 2018-11-12 DIAGNOSIS — Z30.09 FAMILY PLANNING ADVICE: ICD-10-CM

## 2018-11-12 DIAGNOSIS — Z64.1 MULTIPARITY: Primary | ICD-10-CM

## 2018-11-12 PROCEDURE — 4004F PT TOBACCO SCREEN RCVD TLK: CPT | Performed by: OBSTETRICS & GYNECOLOGY

## 2018-11-12 PROCEDURE — 99213 OFFICE O/P EST LOW 20 MIN: CPT | Performed by: OBSTETRICS & GYNECOLOGY

## 2018-11-12 PROCEDURE — G8427 DOCREV CUR MEDS BY ELIG CLIN: HCPCS | Performed by: OBSTETRICS & GYNECOLOGY

## 2018-11-12 PROCEDURE — G8484 FLU IMMUNIZE NO ADMIN: HCPCS | Performed by: OBSTETRICS & GYNECOLOGY

## 2018-11-12 PROCEDURE — G8420 CALC BMI NORM PARAMETERS: HCPCS | Performed by: OBSTETRICS & GYNECOLOGY

## 2018-11-12 RX ORDER — SUMATRIPTAN 50 MG/1
TABLET, FILM COATED ORAL
COMMUNITY
Start: 2017-12-13

## 2018-11-12 NOTE — LETTER
PREOPERATIVE  INSTRUCTIONS for Kasey Davis:    Your procedure, laparoscopic tubal ligation by bipolar partial salpingectomy, has been scheduled for 12/7 at 1 p.m. at [facility]    1) Get your preop labs at [facility] on the day surgery. If you are unable to keep your Pre-op testing appointment, please call them [phone], ext. #[phone]  as soon as possible. Make a list of all your allergies and all the medications that your are currently taking (even over-the-counter and herbal medicines) and take it with you to the hospital both for your pre-op visit and the day of surgery    2) Nothing by mouth after midnight the night before surgery (you don't have to fast before your labs). This includes water, chewing gum, cigarettes or any drugs. If there are medications that you feel you have to take in the morning, ask to speak with the anesthesiologist at your lab visit and get them to approve taking the medication or give you an alternative    3) IF this is to be done outpatient (i.e., you are going home the same day),be sure you have someone to drive you home (even if you only have a local anesthetic). 4) Be at the hospital 2 hours before surgery (meaning, get there by 11 a.m. on 12/7)    5) Do not shave the surgical area    6) Leave all valuables at home. Wear comfortable clothing that is easy to take off and put on. Remove all make-up, especially lipstick and nail polish prior to surgery. 7) Do NOT take aspirin or aspirin-like products for 5-7 days prior to surgery unless your doctor tells you otherwise. Tylenol (Acetominophen), Vicodin, and Percocet are OK to take up to the day of surgery, but DO NOT take them the day of surgery    8) Call the office after you get home to schedule a return appointment if you don't already have one ([phone])  Always feel free to call ([phone]) if you have any worries or concerns.     Joanne Argueta M.D.

## 2018-11-12 NOTE — PROGRESS NOTES
[address] [place] [address] [address]  [facility], [facility]  Phone: [phone]  Fax: [phone]    Pre-operative Orders    [facility]  1985  2018  Primary Care Physician: Wanda Winters    HISTORY & PHYSICAL      2018       HOSPITAL: [facility]    HPI: Celina Hernandez is a 29 y.o. female [id number] who desires permanent sterilization. She signed the Medicaid form on 10/17, so we will schedule her surgery at [facility] on . Multiparity    2. Family planning advice       Patient Active Problem List   Diagnosis    Hypothyroid    H/O LEEP ()    S/P BMZ ,      Acute nonintractable headache    Right carpal tunnel syndrome       Obstetric History       T2      L2     SAB0   TAB0   Ectopic0   Molar0   Multiple0   Live Births2       # Outcome Date GA Lbr Calvin/2nd Weight Sex Delivery Anes PTL Lv   4 Term 07/23/15 39w1d  6 lb 13.5 oz (3.104 kg) M  EPI  RADHA      Apgar1:  8                Apgar5: 9   3 Term 09 39w0d  8 lb 14 oz (4.026 kg) F Vag-Spont   RADHA      Name: Kim Valdes   2 AB            1 AB                 Past Medical History:   Diagnosis Date    Cervical shortening     Hashimoto's disease     Hypoactive thyroid     Pap smear of cervix with ASCUS, cannot exclude HGSIL 2010       Past Surgical History:   Procedure Laterality Date    CERVICAL CERCLAGE  4-11-15    DILATION AND CURETTAGE      x 2    INSERTION OF CONTRACEPTIVE CAPSULE Left 10/01/2015    Nexplanon insertion     LEEP  2010    JUSTINA 2 and 3, HGSIL    OTHER SURGICAL HISTORY  10/19/15    skin bx pubic area, rt midline-pigmented seborrheic keratosis       Social History     Social History    Marital status: Single     Spouse name: N/A    Number of children: N/A    Years of education: N/A     Occupational History    Not on file.      Social History Main Topics    Smoking status: Current Every Day Smoker     Packs/day: 0.50     Years: second surgery and that there may be an incomplete removal of abnormal tissue.

## 2018-11-20 DIAGNOSIS — Z01.419 ENCOUNTER FOR GYNECOLOGICAL EXAMINATION: ICD-10-CM

## 2018-12-10 ENCOUNTER — TELEPHONE (OUTPATIENT)
Dept: OBGYN CLINIC | Age: 33
End: 2018-12-10

## 2018-12-10 NOTE — TELEPHONE ENCOUNTER
Patient called to ask about when she is allowed to go back to work. She said her job is calling and asking for paperwork from the doctor stating when she will be released to go back. Her post op appt isn't until 12/20 and she doesn't want to wait until then to be seen and released to go back to work. She's wondering what to do.

## 2018-12-20 ENCOUNTER — OFFICE VISIT (OUTPATIENT)
Dept: OBGYN CLINIC | Age: 33
End: 2018-12-20
Payer: COMMERCIAL

## 2018-12-20 ENCOUNTER — HOSPITAL ENCOUNTER (OUTPATIENT)
Age: 33
Setting detail: SPECIMEN
Discharge: HOME | End: 2018-12-20
Payer: COMMERCIAL

## 2018-12-20 VITALS
WEIGHT: 128.6 LBS | DIASTOLIC BLOOD PRESSURE: 66 MMHG | SYSTOLIC BLOOD PRESSURE: 100 MMHG | HEIGHT: 63 IN | BODY MASS INDEX: 22.79 KG/M2

## 2018-12-20 DIAGNOSIS — G89.18 POSTOPERATIVE PAIN: ICD-10-CM

## 2018-12-20 DIAGNOSIS — G89.18 POSTOPERATIVE PAIN: Primary | ICD-10-CM

## 2018-12-20 DIAGNOSIS — Z09 POSTOP CHECK: ICD-10-CM

## 2018-12-20 LAB
ABSOLUTE EOS #: 0.14 K/UL (ref 0–0.44)
ABSOLUTE IMMATURE GRANULOCYTE: 0.03 K/UL (ref 0–0.3)
ABSOLUTE LYMPH #: 2.26 K/UL (ref 1.1–3.7)
ABSOLUTE MONO #: 0.63 K/UL (ref 0.1–1.2)
BASOPHILS # BLD: 1 % (ref 0–2)
BASOPHILS ABSOLUTE: 0.05 K/UL (ref 0–0.2)
BILIRUBIN URINE: NEGATIVE
COLOR: ABNORMAL
COMMENT UA: ABNORMAL
DIFFERENTIAL TYPE: NORMAL
EOSINOPHILS RELATIVE PERCENT: 2 % (ref 1–4)
GLUCOSE URINE: NEGATIVE
HCT VFR BLD CALC: 39.9 % (ref 36.3–47.1)
HEMOGLOBIN: 12.9 G/DL (ref 11.9–15.1)
IMMATURE GRANULOCYTES: 0 %
KETONES, URINE: NEGATIVE
LEUKOCYTE ESTERASE, URINE: NEGATIVE
LYMPHOCYTES # BLD: 33 % (ref 24–43)
MCH RBC QN AUTO: 29.9 PG (ref 25.2–33.5)
MCHC RBC AUTO-ENTMCNC: 32.3 G/DL (ref 28.4–34.8)
MCV RBC AUTO: 92.6 FL (ref 82.6–102.9)
MONOCYTES # BLD: 9 % (ref 3–12)
NITRITE, URINE: NEGATIVE
NRBC AUTOMATED: 0 PER 100 WBC
PDW BLD-RTO: 12.7 % (ref 11.8–14.4)
PH UA: 7.5 (ref 5–8)
PLATELET # BLD: 221 K/UL (ref 138–453)
PLATELET ESTIMATE: NORMAL
PMV BLD AUTO: 10.8 FL (ref 8.1–13.5)
PROTEIN UA: NEGATIVE
RBC # BLD: 4.31 M/UL (ref 3.95–5.11)
RBC # BLD: NORMAL 10*6/UL
SEG NEUTROPHILS: 55 % (ref 36–65)
SEGMENTED NEUTROPHILS ABSOLUTE COUNT: 3.65 K/UL (ref 1.5–8.1)
SPECIFIC GRAVITY UA: 1.02 (ref 1–1.03)
TURBIDITY: CLEAR
URINE HGB: NEGATIVE
UROBILINOGEN, URINE: NORMAL
WBC # BLD: 6.8 K/UL (ref 3.5–11.3)
WBC # BLD: NORMAL 10*3/UL

## 2018-12-20 PROCEDURE — G8484 FLU IMMUNIZE NO ADMIN: HCPCS | Performed by: OBSTETRICS & GYNECOLOGY

## 2018-12-20 PROCEDURE — 4004F PT TOBACCO SCREEN RCVD TLK: CPT | Performed by: OBSTETRICS & GYNECOLOGY

## 2018-12-20 PROCEDURE — 99213 OFFICE O/P EST LOW 20 MIN: CPT | Performed by: OBSTETRICS & GYNECOLOGY

## 2018-12-20 PROCEDURE — G8428 CUR MEDS NOT DOCUMENT: HCPCS | Performed by: OBSTETRICS & GYNECOLOGY

## 2018-12-20 PROCEDURE — G8420 CALC BMI NORM PARAMETERS: HCPCS | Performed by: OBSTETRICS & GYNECOLOGY

## 2019-01-03 ENCOUNTER — TELEPHONE (OUTPATIENT)
Dept: OBGYN CLINIC | Age: 34
End: 2019-01-03

## 2021-07-25 ENCOUNTER — APPOINTMENT (OUTPATIENT)
Dept: CT IMAGING | Age: 36
End: 2021-07-25
Payer: MEDICARE

## 2021-07-25 ENCOUNTER — HOSPITAL ENCOUNTER (EMERGENCY)
Age: 36
Discharge: HOME | End: 2021-07-25
Attending: EMERGENCY MEDICINE
Payer: MEDICARE

## 2021-07-25 VITALS
RESPIRATION RATE: 14 BRPM | OXYGEN SATURATION: 100 % | TEMPERATURE: 98.4 F | HEART RATE: 89 BPM | WEIGHT: 120 LBS | BODY MASS INDEX: 21.26 KG/M2 | DIASTOLIC BLOOD PRESSURE: 64 MMHG | SYSTOLIC BLOOD PRESSURE: 97 MMHG | HEIGHT: 63 IN

## 2021-07-25 DIAGNOSIS — S00.83XA CONTUSION OF JAW, INITIAL ENCOUNTER: ICD-10-CM

## 2021-07-25 DIAGNOSIS — T07.XXXA MULTIPLE ABRASIONS: ICD-10-CM
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
## 2021-07-25 DIAGNOSIS — S05.12XA PERIORBITAL CONTUSION OF LEFT EYE, INITIAL ENCOUNTER: ICD-10-CM

## 2021-07-25 PROCEDURE — 72125 CT NECK SPINE W/O DYE: CPT

## 2021-07-25 PROCEDURE — 6370000000 HC RX 637 (ALT 250 FOR IP): Performed by: EMERGENCY MEDICINE

## 2021-07-25 PROCEDURE — 70486 CT MAXILLOFACIAL W/O DYE: CPT

## 2021-07-25 PROCEDURE — 99283 EMERGENCY DEPT VISIT LOW MDM: CPT

## 2021-07-25 PROCEDURE — 70450 CT HEAD/BRAIN W/O DYE: CPT

## 2021-07-25 RX ORDER — TRAMADOL HYDROCHLORIDE 50 MG/1
50 TABLET ORAL EVERY 6 HOURS PRN
Qty: 12 TABLET | Refills: 0 | Status: SHIPPED | OUTPATIENT
Start: 2021-07-25 | End: 2021-07-28

## 2021-07-25 RX ORDER — TRAMADOL HYDROCHLORIDE 50 MG/1
50 TABLET ORAL ONCE
Status: COMPLETED | OUTPATIENT
Start: 2021-07-25 | End: 2021-07-25

## 2021-07-25 RX ORDER — CYCLOBENZAPRINE HCL 10 MG
10 TABLET ORAL 3 TIMES DAILY PRN
Qty: 21 TABLET | Refills: 0 | Status: SHIPPED | OUTPATIENT
Start: 2021-07-25 | End: 2021-08-04

## 2021-07-25 RX ADMIN — TRAMADOL HYDROCHLORIDE 50 MG: 50 TABLET, FILM COATED ORAL at 05:00

## 2021-07-25 ASSESSMENT — PAIN DESCRIPTION - LOCATION
LOCATION: FACE
LOCATION: FACE

## 2021-07-25 ASSESSMENT — ENCOUNTER SYMPTOMS
EYE REDNESS: 0
SHORTNESS OF BREATH: 0
COLOR CHANGE: 0
VOMITING: 0
RHINORRHEA: 0
DIARRHEA: 0
EYE DISCHARGE: 0
NAUSEA: 0
SORE THROAT: 0
COUGH: 0

## 2021-07-25 ASSESSMENT — VISUAL ACUITY: OU: 1

## 2021-07-25 ASSESSMENT — PAIN SCALES - GENERAL
PAINLEVEL_OUTOF10: 10
PAINLEVEL_OUTOF10: 2
PAINLEVEL_OUTOF10: 10
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
## 2021-08-23 ENCOUNTER — APPOINTMENT (OUTPATIENT)
Dept: GENERAL RADIOLOGY | Age: 36
End: 2021-08-23
Payer: MEDICARE

## 2021-08-23 ENCOUNTER — HOSPITAL ENCOUNTER (EMERGENCY)
Age: 36
Discharge: HOME | End: 2021-08-23
Attending: EMERGENCY MEDICINE
Payer: MEDICARE

## 2021-08-23 VITALS
SYSTOLIC BLOOD PRESSURE: 130 MMHG | DIASTOLIC BLOOD PRESSURE: 90 MMHG | TEMPERATURE: 98.2 F | BODY MASS INDEX: 20.55 KG/M2 | OXYGEN SATURATION: 100 % | WEIGHT: 116 LBS | HEART RATE: 96 BPM | RESPIRATION RATE: 16 BRPM | HEIGHT: 63 IN

## 2021-08-23 DIAGNOSIS — M25.512 ACUTE PAIN OF LEFT SHOULDER: Primary | ICD-10-CM

## 2021-08-23 PROCEDURE — 99283 EMERGENCY DEPT VISIT LOW MDM: CPT

## 2021-08-23 PROCEDURE — 73030 X-RAY EXAM OF SHOULDER: CPT

## 2021-08-23 RX ORDER — IBUPROFEN 800 MG/1
800 TABLET ORAL EVERY 8 HOURS PRN
Qty: 30 TABLET | Refills: 0 | Status: SHIPPED | OUTPATIENT
Start: 2021-08-23

## 2021-08-23 RX ORDER — TRAMADOL HYDROCHLORIDE 50 MG/1
50 TABLET ORAL EVERY 6 HOURS PRN
Qty: 12 TABLET | Refills: 0 | Status: SHIPPED | OUTPATIENT
Start: 2021-08-23 | End: 2021-08-26

## 2021-08-23 ASSESSMENT — PAIN SCALES - GENERAL: PAINLEVEL_OUTOF10: 10

## 2021-08-23 ASSESSMENT — PAIN DESCRIPTION - LOCATION: LOCATION: SHOULDER

## 2021-08-23 ASSESSMENT — PAIN DESCRIPTION - ORIENTATION: ORIENTATION: LEFT

## 2021-08-23 ASSESSMENT — PAIN DESCRIPTION - PAIN TYPE: TYPE: ACUTE PAIN
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
## 2023-03-15 ENCOUNTER — HOSPITAL ENCOUNTER (INPATIENT)
Age: 38
LOS: 1 days | Discharge: HOME | DRG: 463 | End: 2023-03-17
Attending: EMERGENCY MEDICINE | Admitting: EMERGENCY MEDICINE
Payer: MEDICAID

## 2023-03-15 ENCOUNTER — APPOINTMENT (OUTPATIENT)
Dept: GENERAL RADIOLOGY | Age: 38
DRG: 463 | End: 2023-03-15
Payer: MEDICAID

## 2023-03-15 DIAGNOSIS — N30.00 ACUTE CYSTITIS WITHOUT HEMATURIA: ICD-10-CM

## 2023-03-15 DIAGNOSIS — D72.829 LEUKOCYTOSIS, UNSPECIFIED TYPE: ICD-10-CM

## 2023-03-15 DIAGNOSIS — N39.0 URINARY TRACT INFECTION WITHOUT HEMATURIA, SITE UNSPECIFIED: ICD-10-CM

## 2023-03-15 DIAGNOSIS — R06.00 DYSPNEA, UNSPECIFIED TYPE: Primary | ICD-10-CM

## 2023-03-15 LAB
ABSOLUTE EOS #: 0 K/UL (ref 0–0.4)
ABSOLUTE IMMATURE GRANULOCYTE: 0.89 K/UL (ref 0–0.3)
ABSOLUTE LYMPH #: 3.25 K/UL (ref 1–4.8)
ABSOLUTE MONO #: 2.36 K/UL (ref 0.1–0.8)
ALBUMIN SERPL-MCNC: 4.6 G/DL (ref 3.5–5.2)
ALBUMIN/GLOBULIN RATIO: 1.6 (ref 1–2.5)
ALP SERPL-CCNC: 86 U/L (ref 35–104)
ALT SERPL-CCNC: 13 U/L (ref 5–33)
ANION GAP SERPL CALCULATED.3IONS-SCNC: 12 MMOL/L (ref 9–17)
AST SERPL-CCNC: 15 U/L
BASOPHILS # BLD: 0 % (ref 0–2)
BASOPHILS ABSOLUTE: 0 K/UL (ref 0–0.2)
BILIRUB DIRECT SERPL-MCNC: <0.1 MG/DL
BILIRUB INDIRECT SERPL-MCNC: ABNORMAL MG/DL (ref 0–1)
BILIRUB SERPL-MCNC: 0.2 MG/DL (ref 0.3–1.2)
BILIRUBIN URINE: NEGATIVE
BUN SERPL-MCNC: 13 MG/DL (ref 6–20)
CALCIUM SERPL-MCNC: 9.3 MG/DL (ref 8.6–10.4)
CASTS UA: NORMAL /LPF (ref 0–8)
CHLORIDE SERPL-SCNC: 101 MMOL/L (ref 98–107)
CO2 SERPL-SCNC: 25 MMOL/L (ref 20–31)
COLOR: YELLOW
CREAT SERPL-MCNC: 0.61 MG/DL (ref 0.5–0.9)
D DIMER BLD IA.RAPID-MCNC: <0.17 MG/L FEU
EOSINOPHILS RELATIVE PERCENT: 0 % (ref 1–4)
EPITHELIAL CELLS UA: NORMAL /HPF (ref 0–5)
GFR SERPL CREATININE-BSD FRML MDRD: >60 ML/MIN/1.73M2
GLUCOSE SERPL-MCNC: 87 MG/DL (ref 70–99)
GLUCOSE UR STRIP.AUTO-MCNC: NEGATIVE MG/DL
HCT VFR BLD AUTO: 38.1 % (ref 36.3–47.1)
HGB BLD-MCNC: 12.8 G/DL (ref 11.9–15.1)
IMMATURE GRANULOCYTES: 3 %
KETONES UR STRIP.AUTO-MCNC: NEGATIVE MG/DL
LACTIC ACID, SEPSIS WHOLE BLOOD: 1.1 MMOL/L (ref 0.5–1.9)
LEUKOCYTE ESTERASE UR QL STRIP.AUTO: ABNORMAL
LYMPHOCYTES # BLD: 11 % (ref 24–44)
MAGNESIUM SERPL-MCNC: 2 MG/DL (ref 1.6–2.6)
MCH RBC QN AUTO: 31.8 PG (ref 25.2–33.5)
MCHC RBC AUTO-ENTMCNC: 33.6 G/DL (ref 28.4–34.8)
MCV RBC AUTO: 94.5 FL (ref 82.6–102.9)
MONOCYTES # BLD: 8 % (ref 1–7)
MORPHOLOGY: NORMAL
NITRITE UR QL STRIP.AUTO: NEGATIVE
NRBC AUTOMATED: 0 PER 100 WBC
PDW BLD-RTO: 13.2 % (ref 11.8–14.4)
PLATELET # BLD AUTO: 380 K/UL (ref 138–453)
PMV BLD AUTO: 9.3 FL (ref 8.1–13.5)
POTASSIUM SERPL-SCNC: 4 MMOL/L (ref 3.7–5.3)
PROT SERPL-MCNC: 7.5 G/DL (ref 6.4–8.3)
PROT UR STRIP.AUTO-MCNC: 7 MG/DL (ref 5–8)
PROT UR STRIP.AUTO-MCNC: ABNORMAL MG/DL
RBC # BLD: 4.03 M/UL (ref 3.95–5.11)
RBC CLUMPS #/AREA URNS AUTO: NORMAL /HPF (ref 0–4)
SARS-COV-2 RDRP RESP QL NAA+PROBE: NOT DETECTED
SEG NEUTROPHILS: 78 % (ref 36–66)
SEGMENTED NEUTROPHILS ABSOLUTE COUNT: 23 K/UL (ref 1.8–7.7)
SODIUM SERPL-SCNC: 138 MMOL/L (ref 135–144)
SPECIFIC GRAVITY UA: 1.02 (ref 1–1.03)
SPECIMEN DESCRIPTION: NORMAL
T4 FREE SERPL-MCNC: 0.95 NG/DL (ref 0.93–1.7)
TROPONIN I SERPL DL<=0.01 NG/ML-MCNC: <6 NG/L (ref 0–14)
TSH SERPL-ACNC: 1.07 UIU/ML (ref 0.3–5)
TURBIDITY: ABNORMAL
URINE HGB: NEGATIVE
UROBILINOGEN, URINE: NORMAL
WBC # BLD AUTO: 29.5 K/UL (ref 3.5–11.3)
WBC UA: NORMAL /HPF (ref 0–5)

## 2023-03-15 PROCEDURE — 93005 ELECTROCARDIOGRAM TRACING: CPT | Performed by: EMERGENCY MEDICINE

## 2023-03-15 PROCEDURE — 87086 URINE CULTURE/COLONY COUNT: CPT

## 2023-03-15 PROCEDURE — 80076 HEPATIC FUNCTION PANEL: CPT

## 2023-03-15 PROCEDURE — G0378 HOSPITAL OBSERVATION PER HR: HCPCS

## 2023-03-15 PROCEDURE — 84439 ASSAY OF FREE THYROXINE: CPT

## 2023-03-15 PROCEDURE — 87040 BLOOD CULTURE FOR BACTERIA: CPT

## 2023-03-15 PROCEDURE — 99285 EMERGENCY DEPT VISIT HI MDM: CPT

## 2023-03-15 PROCEDURE — 6370000000 HC RX 637 (ALT 250 FOR IP): Performed by: EMERGENCY MEDICINE

## 2023-03-15 PROCEDURE — 84484 ASSAY OF TROPONIN QUANT: CPT

## 2023-03-15 PROCEDURE — 2580000003 HC RX 258: Performed by: EMERGENCY MEDICINE

## 2023-03-15 PROCEDURE — 83735 ASSAY OF MAGNESIUM: CPT

## 2023-03-15 PROCEDURE — 85379 FIBRIN DEGRADATION QUANT: CPT

## 2023-03-15 PROCEDURE — 83605 ASSAY OF LACTIC ACID: CPT

## 2023-03-15 PROCEDURE — 80048 BASIC METABOLIC PNL TOTAL CA: CPT

## 2023-03-15 PROCEDURE — 96360 HYDRATION IV INFUSION INIT: CPT

## 2023-03-15 PROCEDURE — 87635 SARS-COV-2 COVID-19 AMP PRB: CPT

## 2023-03-15 PROCEDURE — 81001 URINALYSIS AUTO W/SCOPE: CPT

## 2023-03-15 PROCEDURE — 71045 X-RAY EXAM CHEST 1 VIEW: CPT

## 2023-03-15 PROCEDURE — 85025 COMPLETE CBC W/AUTO DIFF WBC: CPT

## 2023-03-15 PROCEDURE — 84443 ASSAY THYROID STIM HORMONE: CPT

## 2023-03-15 PROCEDURE — 36415 COLL VENOUS BLD VENIPUNCTURE: CPT

## 2023-03-15 RX ORDER — SODIUM CHLORIDE 9 MG/ML
INJECTION, SOLUTION INTRAVENOUS PRN
Status: DISCONTINUED | OUTPATIENT
Start: 2023-03-15 | End: 2023-03-17 | Stop reason: HOSPADM

## 2023-03-15 RX ORDER — 0.9 % SODIUM CHLORIDE 0.9 %
1000 INTRAVENOUS SOLUTION INTRAVENOUS ONCE
Status: COMPLETED | OUTPATIENT
Start: 2023-03-15 | End: 2023-03-15

## 2023-03-15 RX ORDER — NITROFURANTOIN 25; 75 MG/1; MG/1
100 CAPSULE ORAL EVERY 12 HOURS SCHEDULED
Status: DISCONTINUED | OUTPATIENT
Start: 2023-03-15 | End: 2023-03-17 | Stop reason: HOSPADM

## 2023-03-15 RX ORDER — FOLIC ACID 1 MG/1
2 TABLET ORAL DAILY
COMMUNITY

## 2023-03-15 RX ORDER — SODIUM CHLORIDE 0.9 % (FLUSH) 0.9 %
5-40 SYRINGE (ML) INJECTION PRN
Status: DISCONTINUED | OUTPATIENT
Start: 2023-03-15 | End: 2023-03-17 | Stop reason: HOSPADM

## 2023-03-15 RX ORDER — POLYETHYLENE GLYCOL 3350 17 G/17G
17 POWDER, FOR SOLUTION ORAL DAILY PRN
Status: DISCONTINUED | OUTPATIENT
Start: 2023-03-15 | End: 2023-03-17 | Stop reason: HOSPADM

## 2023-03-15 RX ORDER — ENOXAPARIN SODIUM 100 MG/ML
40 INJECTION SUBCUTANEOUS DAILY
Status: DISCONTINUED | OUTPATIENT
Start: 2023-03-15 | End: 2023-03-17 | Stop reason: HOSPADM

## 2023-03-15 RX ORDER — ONDANSETRON 2 MG/ML
4 INJECTION INTRAMUSCULAR; INTRAVENOUS EVERY 6 HOURS PRN
Status: DISCONTINUED | OUTPATIENT
Start: 2023-03-15 | End: 2023-03-17 | Stop reason: HOSPADM

## 2023-03-15 RX ORDER — NITROFURANTOIN 25; 75 MG/1; MG/1
100 CAPSULE ORAL ONCE
Status: COMPLETED | OUTPATIENT
Start: 2023-03-15 | End: 2023-03-15

## 2023-03-15 RX ORDER — ACETAMINOPHEN 650 MG/1
650 SUPPOSITORY RECTAL EVERY 6 HOURS PRN
Status: DISCONTINUED | OUTPATIENT
Start: 2023-03-15 | End: 2023-03-17 | Stop reason: HOSPADM

## 2023-03-15 RX ORDER — AMLODIPINE BESYLATE AND ATORVASTATIN CALCIUM 5; 10 MG/1; MG/1
1 TABLET, FILM COATED ORAL DAILY
COMMUNITY

## 2023-03-15 RX ORDER — ACETAMINOPHEN 325 MG/1
650 TABLET ORAL EVERY 6 HOURS PRN
Status: DISCONTINUED | OUTPATIENT
Start: 2023-03-15 | End: 2023-03-17 | Stop reason: HOSPADM

## 2023-03-15 RX ORDER — SODIUM CHLORIDE 0.9 % (FLUSH) 0.9 %
5-40 SYRINGE (ML) INJECTION EVERY 12 HOURS SCHEDULED
Status: DISCONTINUED | OUTPATIENT
Start: 2023-03-15 | End: 2023-03-17 | Stop reason: HOSPADM

## 2023-03-15 RX ORDER — ONDANSETRON 4 MG/1
4 TABLET, ORALLY DISINTEGRATING ORAL EVERY 8 HOURS PRN
Status: DISCONTINUED | OUTPATIENT
Start: 2023-03-15 | End: 2023-03-17 | Stop reason: HOSPADM

## 2023-03-15 RX ADMIN — NITROFURANTOIN MONOHYDRATE/MACROCRYSTALS 100 MG: 75; 25 CAPSULE ORAL at 18:13

## 2023-03-15 RX ADMIN — SODIUM CHLORIDE, PRESERVATIVE FREE 10 ML: 5 INJECTION INTRAVENOUS at 21:14

## 2023-03-15 RX ADMIN — SODIUM CHLORIDE 1000 ML: 9 INJECTION, SOLUTION INTRAVENOUS at 15:26

## 2023-03-15 RX ADMIN — NITROFURANTOIN MONOHYDRATE/MACROCRYSTALLINE 100 MG: 25; 75 CAPSULE ORAL at 21:08

## 2023-03-15 ASSESSMENT — PAIN SCALES - GENERAL: PAINLEVEL_OUTOF10: 0

## 2023-03-15 ASSESSMENT — ENCOUNTER SYMPTOMS
COLOR CHANGE: 0
ABDOMINAL PAIN: 0
NAUSEA: 0
VOMITING: 0
SHORTNESS OF BREATH: 1
COUGH: 0
CHEST TIGHTNESS: 0

## 2023-03-15 ASSESSMENT — PAIN - FUNCTIONAL ASSESSMENT: PAIN_FUNCTIONAL_ASSESSMENT: NONE - DENIES PAIN

## 2023-03-15 NOTE — ED NOTES
Patient ambulated to restroom with a steady gait, writer provided patient with specimen cup     Fidelia Salvage, RN  03/15/23 3201

## 2023-03-15 NOTE — ED TRIAGE NOTES
Pt to ED c/o intermittent shortness of breath and palpitations over the past few days, usually while up moving around but today began happening while at rest. Pt states that position changes do not make her palpitations worse, and states a pain in her chest when the shortness of breath comes on. Pt denies cardiac hx, states hs RA and old dx of hashimoto that has not required medications in over 10 years.

## 2023-03-15 NOTE — ED PROVIDER NOTES
[facility] ED  Emergency Department Encounter  Emergency Medicine Resident     Pt Name:Kasey Arriaga  MRN: [id number]  [place] 1985  Date of evaluation: 3/15/23  PCP:  Harriet Alejandre  Note Started: 2:37 PM EDT      CHIEF COMPLAINT       Chief Complaint   Patient presents with    Shortness of Breath    Palpitations       HISTORY OF PRESENT ILLNESS  (Location/Symptom, Timing/Onset, Context/Setting, Quality, Duration, Modifying Factors, Severity.)      Savannah Berkowitz is a 41 y.o. female with past medical history of rheumatoid arthritis and Hashimoto's (not on any medication for it) who presents with palpitations, dizziness, lightheadedness, shortness of breath. Patient says that this has been going on for the past several days. Normally patient says that the symptoms will come on when she is standing or walking however at this time the symptoms came on when she was sitting at work therefore she was concerned. Patient also states sometimes she will feel flushed in the face. Patient complains of increased urinary frequency however denies dysuria, odor, and difficulty urinating. Patient also denies fever, chest pain, cough, and diarrhea. PAST MEDICAL / SURGICAL / SOCIAL / FAMILY HISTORY      has a past medical history of Cervical shortening, Hashimoto's disease, Hypoactive thyroid, and Pap smear of cervix with ASCUS, cannot exclude HGSIL. has a past surgical history that includes LEEP (06/21/2010); Dilation & curettage; Cervical Cerclage (4-11-15); other surgical history (10/19/15); and Insertion of contraceptive capsule (Left, 10/01/2015).       Social History     Socioeconomic History    Marital status: Single     Spouse name: Not on file    Number of children: Not on file    Years of education: Not on file    Highest education level: Not on file   Occupational History    Not on file   Tobacco Use    Smoking status: Every Day     Packs/day: 0.50     Years: 13.00 Pack years: 6.50     Types: Cigarettes    Smokeless tobacco: Never   Vaping Use    Vaping Use: Never used   Substance and Sexual Activity    Alcohol use: Yes     Alcohol/week: 0.0 standard drinks    Drug use: No    Sexual activity: Yes     Partners: Male     Birth control/protection: Implant   Other Topics Concern    Not on file   Social History Narrative    Not on file     Social Determinants of Health     Financial Resource Strain: Not on file   Food Insecurity: Not on file   Transportation Needs: Not on file   Physical Activity: Not on file   Stress: Not on file   Social Connections: Not on file   Intimate Partner Violence: Not on file   Housing Stability: Not on file       Family History   Problem Relation Age of Onset    Thyroid Disease Mother     COPD Father     Emphysema Father     Other Father         double lung transplant    COPD Paternal Grandmother     Cancer Paternal Grandfather         Lung CA       Allergies:  Patient has no known allergies. Home Medications:  Prior to Admission medications    Medication Sig Start Date End Date Taking? Authorizing Provider   methotrexate (RHEUMATREX) 2.5 MG chemo tablet Take by mouth once a week 2/12/22  Yes Historical Provider, MD   ibuprofen (ADVIL;MOTRIN) 800 MG tablet Take 1 tablet by mouth every 8 hours as needed for Pain 8/23/21   Isabel Monge MD   SUMAtriptan (IMITREX) 50 MG tablet Take 1 tab only for severe headache. Can repeat after 2 hours. Max 2 tabs/day or 4 tabs/week. 12/13/17   Historical Provider, MD       REVIEW OF SYSTEMS       Review of Systems   Constitutional:  Positive for fatigue. Negative for chills and fever. Respiratory:  Positive for shortness of breath. Negative for cough and chest tightness. Cardiovascular:  Negative for chest pain. Gastrointestinal:  Negative for abdominal pain, nausea and vomiting. Genitourinary:  Positive for frequency. Negative for difficulty urinating and dysuria.    Skin:  Negative for color change and rash. Allergic/Immunologic: Negative for environmental allergies and food allergies. Neurological:  Negative for dizziness, weakness, light-headedness and headaches. Psychiatric/Behavioral:  Negative for confusion. PHYSICAL EXAM      INITIAL VITALS:   /70   Pulse 78   Temp 98.8 °F (37.1 °C) (Oral)   Resp 20   Ht 5' 3\" (1.6 m)   Wt 130 lb (59 kg)   SpO2 96%   BMI 23.03 kg/m²     Physical Exam  Constitutional:       Appearance: Normal appearance. HENT:      Head: Normocephalic and atraumatic. Cardiovascular:      Rate and Rhythm: Normal rate and regular rhythm. Pulses: Normal pulses. Heart sounds: Normal heart sounds. Pulmonary:      Effort: Pulmonary effort is normal.      Breath sounds: Normal breath sounds. Abdominal:      General: There is no distension. Palpations: Abdomen is soft. Tenderness: There is no abdominal tenderness. Musculoskeletal:      Right lower leg: No edema. Left lower leg: No edema. Skin:     General: Skin is warm. Capillary Refill: Capillary refill takes less than 2 seconds. Neurological:      General: No focal deficit present. Mental Status: She is alert and oriented to person, place, and time. Psychiatric:         Mood and Affect: Mood normal.         DDX/DIAGNOSTIC RESULTS / EMERGENCY DEPARTMENT COURSE / MDM     Medical Decision Making  Patient was evaluated bedside. EKG and orthostatic was obtained. Patient's heart rate increased by 20 when standing therefore was given 1 L normal saline bolus. Labs obtained include CBC, BMP, mag, troponin, TSH, T4, urinalysis, hepatic function, D-dimer, chest x-ray. Patient's WBC is elevated at 29.5. Patient does state that she takes steroids for her rheumatoid arthritis. Patient last took prednisone 10 mg on Saturday. Patient received an intra-articular steroid injection into her left knee yesterday.   This is unlikely to be the cause for her elevated white blood cell count therefore will try and find infectious cause. Urinalysis was positive for UTI. Patient was started on Macrobid 100 mg twice daily. Given that the patient has leukocytosis with a positive urinary source, palpitations, lightheadedness, and dizziness the decision was made to admit the patient to the observation unit for further work-up of her leukocytosis as well as to see cardiology in the morning for her palpitations, lightheadedness, dizziness. Amount and/or Complexity of Data Reviewed  Labs: ordered. Decision-making details documented in ED Course. Radiology: ordered. Risk  Prescription drug management. Decision regarding hospitalization. EKG  EKG Interpretation    Interpreted by me    Rhythm: Sinus tachycardia  Rate: Tachycardic  Axis: normal  Ectopy: none  Conduction: normal  ST Segments: no acute change  T Waves: no acute change  Q Waves: none    Clinical Impression: no acute changes and normal EKG    All EKG's are interpreted by the Emergency Department Physician who either signs or Co-signs this chart in the absence of a cardiologist.    EMERGENCY DEPARTMENT COURSE:  Patient was evaluated bedside. EKG and orthostatic was obtained. Patient's heart rate increased by 20 when standing therefore was given 1 L normal saline bolus. Labs obtained include CBC, BMP, mag, troponin, TSH, T4, urinalysis, D-dimer, hepatic panel, blood cultures, lactic acid, chest x-ray. Patient also complains of increased fatigue therefore we will also rule out COVID infection. Urine came back positive for UTI. Will obtain blood cultures x2 and lactic acid Will start patient on Macrobid 100mg twice daily. Patient was admitted to the observation unit for further work-up of her leukocytosis as well as to see cardiology in the morning for her palpitations, dizziness, lightheadedness.     ED Course as of 03/15/23 1903   Wed Mar 15, 2023   1650 Bilirubin, Urine: NEGATIVE [MO]      ED Course User Index  [MO] Alanna Berg MD Emiliana         PROCEDURES:  None    CONSULTS:  IP CONSULT TO CARDIOLOGY        FINAL IMPRESSION      1. Dyspnea, unspecified type    2. Urinary tract infection without hematuria, site unspecified    3. Leukocytosis, unspecified type          DISPOSITION / PLAN     DISPOSITION Admitted 03/15/2023 06:28:51 PM      PATIENT REFERRED TO:  No follow-up provider specified.    DISCHARGE MEDICATIONS:  New Prescriptions    No medications on file       Sandi Carlos MD  Emergency Medicine Resident    (Please note that portions of thisnote were completed with a voice recognition program.  Efforts were made to edit the dictations but occasionally words are mis-transcribed.)      Sandi Carlos MD  Resident  03/15/23 7116

## 2023-03-15 NOTE — ED PROVIDER NOTES
Patient signed out by Dr. Shayy Rivers at the completion of his shift. This patient presented with complaints of palpitations, dizziness, lightheadedness, shortness of breath intermittently for the past several days and worse this morning while sitting at work. No fevers or chills. No chest pain. No cough. No nausea vomiting or diarrhea. No abdominal pain. Past medical history significant for thyroiditis and rheumatoid arthritis. I have reviewed the patient's lab results. Note is made of a white count of 29,500 with a significant left shift. Notable pyuria with 20-50 white cells per high-power field. Nasal swab for COVID is negative. Remainder lab work is unremarkable. Note is made of a normal D-dimer. I reviewed the patient's chest x-ray as well as the radiologist interpretation and they are unremarkable.

## 2023-03-15 NOTE — ED PROVIDER NOTES
[facility] ED  eMERGENCY dEPARTMENT eNCOUnter   Attending Attestation     Pt Name: Savannah Berkowitz  MRN: [id number]  [place] 1985  Date of evaluation: 3/15/23       Savannah Berkowitz is a 41 y.o. female who presents with Shortness of Breath and Palpitations      History: Patient presents with sudden onset intermittent shortness of breath, lightheadedness, palpitations. Patient says this been going on for approximately 5 days. Patient has been experiencing urinary frequency for about a month. Patient also says that her bilateral lower extremities feel heavy. No unilateral leg swelling or pain. Patient has no symptoms at this time. Patient has history of Hashimoto's thyroiditis and had a knee injection yesterday. Exam: Heart rate and rhythm are regular. Lungs are clear to auscultation bilaterally. Abdomen soft, nontender. Patient awake alert and acting appropriately. EKG shows sinus tachycardia with rate of 105. Normal axis. No ST elevation or depression. T waves are upright. No blocks arrhythmias. Nonspecific EKG. Concern for possible thyroid issues. Will obtain TSH, will get ACS screening, will get chest x-ray, will get a D-dimer given mild tachycardia. Will consider admission. Patient does have elevated white count, mild UTI. Concern for continuous episodes of presyncope and possible arrhythmia. I performed a history and physical examination of the patient and discussed management with the resident. I reviewed the residents note and agree with the documented findings and plan of care. Any areas of disagreement are noted on the chart. I was personally present for the key portions of any procedures. I have documented in the chart those procedures where I was not present during the key portions. I have personally reviewed all images and agree with the resident's interpretation. I have reviewed the emergency nurses triage note.  I agree with the chief complaint, past medical history, past surgical history, allergies, medications, social and family history as documented unless otherwise noted below. Documentation of the HPI, Physical Exam and Medical Decision Making performed by medical students or scribes is based on my personal performance of the HPI, PE and MDM. For Phys Assistant/ Nurse Practitioner cases/documentation I have had a face to face evaluation of this patient and have completed at least one if not all key elements of the E/M (history, physical exam, and MDM). Additional findings are as noted. For APC cases I have personally evaluated and examined the patient in conjunction with the APC and agree with the treatment plan and disposition of the patient as recorded by the APC.     Jose Maria Sharpe MD  Attending Emergency  Physician        Bell Mathew MD  03/15/23 8286

## 2023-03-15 NOTE — ED NOTES
Xray at bedside     Forrest Cisse, [facility]  03/15/23 9543

## 2023-03-15 NOTE — ED NOTES
ED to inpatient nurses report      Chief Complaint:  Chief Complaint   Patient presents with    Shortness of Breath    Palpitations     Present to ED from: home    MOA:     LOC: alert and orientated to name, place, date  Mobility: Independent  Oxygen Baseline: none    Current needs required: none   Pending ED orders: none  Present condition: stable    Pertinent event(s): none      Mental Status:  Level of Consciousness: Alert (0)    Psych Assessment:   Psychosocial  Psychosocial (WDL): Within Defined Limits  Vital signs   Vitals:    03/15/23 1516 03/15/23 1649 03/15/23 1700 03/15/23 1800   BP:   112/79 109/70   Pulse: 77 87 78    Resp: 16 24 20    Temp:       TempSrc:       SpO2: 96% 96%     Weight:       Height:            Vitals:  Patient Vitals for the past 24 hrs:   BP Temp Temp src Pulse Resp SpO2 Height Weight   03/15/23 1800 109/70 -- -- -- -- -- -- --   03/15/23 1700 112/79 -- -- 78 20 -- -- --   03/15/23 1649 -- -- -- 87 24 96 % -- --   03/15/23 1516 -- -- -- 77 16 96 % -- --   03/15/23 1456 -- -- -- 87 15 96 % -- --   03/15/23 1440 -- -- -- -- -- -- 5' 3\" (1.6 m) 130 lb (59 kg)   03/15/23 1320 120/75 98.8 °F (37.1 °C) Oral (!) 106 20 100 % -- --      Visit Vitals  /70   Pulse 78   Temp 98.8 °F (37.1 °C) (Oral)   Resp 20   Ht 5' 3\" (1.6 m)   Wt 130 lb (59 kg)   SpO2 96%   BMI 23.03 kg/m²        LDAs:   Peripheral IV 03/15/23 Right Antecubital (Active)   Site Assessment Clean, dry & intact 03/15/23 1445   Line Status Brisk blood return;Normal saline locked 03/15/23 1445   Phlebitis Assessment No symptoms 03/15/23 1445   Infiltration Assessment 0 03/15/23 1445   Dressing Status Clean, dry & intact 03/15/23 1445   Dressing Type Transparent 03/15/23 1445       Ambulatory Status:  No data recorded    Diagnosis:  DISPOSITION Admitted 03/15/2023 06:28:51 PM   Final diagnoses:   Dyspnea, unspecified type   Urinary tract infection without hematuria, site unspecified   Leukocytosis, unspecified type Code Status: [unfilled]     Consults:  []  Hospitalist  Completed  [] yes [] no  []  Medicine  Completed  [] yes [] No  []  Cardiology  Completed  [] yes [] No  []  GI   Completed  [] yes [] No  []  Neurology  Completed  [] yes [] No  []  Nephrology Completed  [] yes [] No  []  Vascular  Completed  [] yes [] No   []  Surgery  Completed  [] yes [] No   []  Urology  Completed  [] yes [] No   []  Plastics  Completed  [] yes [] No   []  ENT  Completed  [] yes [] No   []  Other:  Completed  [] yes [] No    Consults:  IP CONSULT TO CARDIOLOGY     Treatment Team:   Treatment Team: Attending Provider: Jacqueline Estrada MD; Resident: Arely De Paz MD; Consulting Physician: Swapna Self MD; Registered Nurse: [place], RN    Treatment:  ED Course as of 03/15/23 1923   Wed Mar 15, 2023   1650 Bilirubin, Urine: NEGATIVE [MO]      ED Course User Index  [MO] Nancy Thibodeaux MD              Skin Assessment:        Pain Score:  Pain Assessment  Pain Assessment: None - Denies Pain      SOCIAL HISTORY       Social History     Socioeconomic History    Marital status: Single     Spouse name: None    Number of children: None    Years of education: None    Highest education level: None   Tobacco Use    Smoking status: Every Day     Packs/day: 0.50     Years: 13.00     Pack years: 6.50     Types: Cigarettes    Smokeless tobacco: Never   Vaping Use    Vaping Use: Never used   Substance and Sexual Activity    Alcohol use: Yes     Alcohol/week: 0.0 standard drinks    Drug use: No    Sexual activity: Yes     Partners: Male     Birth control/protection: Implant       FAMILY HISTORY       Family History   Problem Relation Age of Onset    Thyroid Disease Mother     COPD Father     Emphysema Father     Other Father         double lung transplant    COPD Paternal Grandmother     Cancer Paternal Grandfather         Lung CA       ALLERGIES     Patient has no known allergies.     CURRENT MEDICATIONS       Previous Medications IBUPROFEN (ADVIL;MOTRIN) 800 MG TABLET    Take 1 tablet by mouth every 8 hours as needed for Pain    METHOTREXATE (RHEUMATREX) 2.5 MG CHEMO TABLET    Take by mouth once a week    SUMATRIPTAN (IMITREX) 50 MG TABLET    Take 1 tab only for severe headache. Can repeat after 2 hours. Max 2 tabs/day or 4 tabs/week. Orders Placed This Encounter   Medications    0.9 % sodium chloride bolus    nitrofurantoin (macrocrystal-monohydrate) (MACROBID) capsule 100 mg     Order Specific Question:   Antimicrobial Indications     Answer:   Urinary Tract Infection       SURGICAL HISTORY       Past Surgical History:   Procedure Laterality Date    CERVICAL CERCLAGE  4-11-15    DILATION AND CURETTAGE      x 2    INSERTION OF CONTRACEPTIVE CAPSULE Left 10/01/2015    Nexplanon insertion     LEEP  06/21/2010    JUSTINA 2 and 3, HGSIL    OTHER SURGICAL HISTORY  10/19/15    skin bx pubic area, rt midline-pigmented seborrheic keratosis       PAST MEDICAL HISTORY       Past Medical History:   Diagnosis Date    Cervical shortening     Hashimoto's disease     Hypoactive thyroid     Pap smear of cervix with ASCUS, cannot exclude HGSIL 03/09/2010       Labs:  Labs Reviewed   CBC WITH AUTO DIFFERENTIAL - Abnormal; Notable for the following components:       Result Value    WBC 29.5 (*)     Immature Granulocytes 3 (*)     Seg Neutrophils 78 (*)     Lymphocytes 11 (*)     Monocytes 8 (*)     Eosinophils % 0 (*)     Absolute Immature Granulocyte 0.89 (*)     Segs Absolute 23.00 (*)     Absolute Mono # 2.36 (*)     All other components within normal limits   URINALYSIS WITH REFLEX TO CULTURE - Abnormal; Notable for the following components:    Turbidity UA Cloudy (*)     Protein, UA TRACE (*)     Leukocyte Esterase, Urine SMALL (*)     All other components within normal limits   HEPATIC FUNCTION PANEL - Abnormal; Notable for the following components:     Total Bilirubin 0.2 (*)     All other components within normal limits   COVID-19, RAPID CULTURE, URINE   CULTURE, BLOOD 1   CULTURE, BLOOD 1   BASIC METABOLIC PANEL   MAGNESIUM   TROPONIN   TSH   T4, FREE   MICROSCOPIC URINALYSIS   D-DIMER, QUANTITATIVE   LACTATE, SEPSIS       Electronically signed by Helen Frias RN on 3/15/2023 at 7:23 PM       Helen Frias RN  03/15/23 1923

## 2023-03-16 PROBLEM — D72.829 LEUKOCYTOSIS: Status: ACTIVE | Noted: 2023-03-16

## 2023-03-16 LAB
ABSOLUTE EOS #: 0 K/UL (ref 0–0.44)
ABSOLUTE EOS #: <0.03 K/UL (ref 0–0.44)
ABSOLUTE IMMATURE GRANULOCYTE: 0.35 K/UL (ref 0–0.3)
ABSOLUTE IMMATURE GRANULOCYTE: 0.44 K/UL (ref 0–0.3)
ABSOLUTE LYMPH #: 1.95 K/UL (ref 1.1–3.7)
ABSOLUTE LYMPH #: 3.32 K/UL (ref 1.1–3.7)
ABSOLUTE MONO #: 1.18 K/UL (ref 0.1–1.2)
ABSOLUTE MONO #: 1.99 K/UL (ref 0.1–1.2)
ANION GAP SERPL CALCULATED.3IONS-SCNC: 10 MMOL/L (ref 9–17)
ANION GAP SERPL CALCULATED.3IONS-SCNC: 9 MMOL/L (ref 9–17)
BASOPHILS # BLD: 1 % (ref 0–2)
BASOPHILS # BLD: 1 % (ref 0–2)
BASOPHILS ABSOLUTE: 0.1 K/UL (ref 0–0.2)
BASOPHILS ABSOLUTE: 0.22 K/UL (ref 0–0.2)
BUN SERPL-MCNC: 13 MG/DL (ref 6–20)
BUN SERPL-MCNC: 14 MG/DL (ref 6–20)
CALCIUM SERPL-MCNC: 8.9 MG/DL (ref 8.6–10.4)
CALCIUM SERPL-MCNC: 9.6 MG/DL (ref 8.6–10.4)
CHLORIDE SERPL-SCNC: 101 MMOL/L (ref 98–107)
CHLORIDE SERPL-SCNC: 103 MMOL/L (ref 98–107)
CO2 SERPL-SCNC: 22 MMOL/L (ref 20–31)
CO2 SERPL-SCNC: 26 MMOL/L (ref 20–31)
CREAT SERPL-MCNC: 0.61 MG/DL (ref 0.5–0.9)
CREAT SERPL-MCNC: 0.71 MG/DL (ref 0.5–0.9)
EOSINOPHILS RELATIVE PERCENT: 0 % (ref 1–4)
EOSINOPHILS RELATIVE PERCENT: 0 % (ref 1–4)
GFR SERPL CREATININE-BSD FRML MDRD: >60 ML/MIN/1.73M2
GFR SERPL CREATININE-BSD FRML MDRD: >60 ML/MIN/1.73M2
GLUCOSE SERPL-MCNC: 100 MG/DL (ref 70–99)
GLUCOSE SERPL-MCNC: 114 MG/DL (ref 70–99)
HCT VFR BLD AUTO: 35.9 % (ref 36.3–47.1)
HCT VFR BLD AUTO: 39.6 % (ref 36.3–47.1)
HGB BLD-MCNC: 12.2 G/DL (ref 11.9–15.1)
HGB BLD-MCNC: 13.1 G/DL (ref 11.9–15.1)
IMMATURE GRANULOCYTES: 2 %
IMMATURE GRANULOCYTES: 2 %
LYMPHOCYTES # BLD: 15 % (ref 24–43)
LYMPHOCYTES # BLD: 9 % (ref 24–43)
MAGNESIUM SERPL-MCNC: 2 MG/DL (ref 1.6–2.6)
MCH RBC QN AUTO: 31.7 PG (ref 25.2–33.5)
MCH RBC QN AUTO: 33.9 PG (ref 25.2–33.5)
MCHC RBC AUTO-ENTMCNC: 33.1 G/DL (ref 28.4–34.8)
MCHC RBC AUTO-ENTMCNC: 34 G/DL (ref 28.4–34.8)
MCV RBC AUTO: 95.9 FL (ref 82.6–102.9)
MCV RBC AUTO: 99.7 FL (ref 82.6–102.9)
MICROORGANISM SPEC CULT: NO GROWTH
MONOCYTES # BLD: 6 % (ref 3–12)
MONOCYTES # BLD: 9 % (ref 3–12)
MORPHOLOGY: NORMAL
NRBC AUTOMATED: 0 PER 100 WBC
NRBC AUTOMATED: 0 PER 100 WBC
PDW BLD-RTO: 13.2 % (ref 11.8–14.4)
PDW BLD-RTO: 13.8 % (ref 11.8–14.4)
PLATELET # BLD AUTO: 352 K/UL (ref 138–453)
PLATELET # BLD AUTO: 603 K/UL (ref 138–453)
PMV BLD AUTO: 10.9 FL (ref 8.1–13.5)
PMV BLD AUTO: 9.4 FL (ref 8.1–13.5)
POTASSIUM SERPL-SCNC: 4 MMOL/L (ref 3.7–5.3)
POTASSIUM SERPL-SCNC: 4.4 MMOL/L (ref 3.7–5.3)
RBC # BLD: 3.6 M/UL (ref 3.95–5.11)
RBC # BLD: 4.13 M/UL (ref 3.95–5.11)
SEG NEUTROPHILS: 73 % (ref 36–65)
SEG NEUTROPHILS: 83 % (ref 36–65)
SEGMENTED NEUTROPHILS ABSOLUTE COUNT: 16.13 K/UL (ref 1.5–8.1)
SEGMENTED NEUTROPHILS ABSOLUTE COUNT: 17.99 K/UL (ref 1.5–8.1)
SODIUM SERPL-SCNC: 135 MMOL/L (ref 135–144)
SODIUM SERPL-SCNC: 136 MMOL/L (ref 135–144)
SPECIMEN DESCRIPTION: NORMAL
WBC # BLD AUTO: 21.6 K/UL (ref 3.5–11.3)
WBC # BLD AUTO: 22.1 K/UL (ref 3.5–11.3)

## 2023-03-16 PROCEDURE — 2580000003 HC RX 258: Performed by: STUDENT IN AN ORGANIZED HEALTH CARE EDUCATION/TRAINING PROGRAM

## 2023-03-16 PROCEDURE — 96361 HYDRATE IV INFUSION ADD-ON: CPT

## 2023-03-16 PROCEDURE — 85025 COMPLETE CBC W/AUTO DIFF WBC: CPT

## 2023-03-16 PROCEDURE — 93017 CV STRESS TEST TRACING ONLY: CPT

## 2023-03-16 PROCEDURE — G0378 HOSPITAL OBSERVATION PER HR: HCPCS

## 2023-03-16 PROCEDURE — 83735 ASSAY OF MAGNESIUM: CPT

## 2023-03-16 PROCEDURE — 93242 EXT ECG>48HR<7D RECORDING: CPT

## 2023-03-16 PROCEDURE — 93243 EXT ECG>48HR<7D SCAN A/R: CPT

## 2023-03-16 PROCEDURE — 2580000003 HC RX 258: Performed by: NURSE PRACTITIONER

## 2023-03-16 PROCEDURE — 6370000000 HC RX 637 (ALT 250 FOR IP): Performed by: EMERGENCY MEDICINE

## 2023-03-16 PROCEDURE — 1200000000 HC SEMI PRIVATE

## 2023-03-16 PROCEDURE — 2580000003 HC RX 258: Performed by: EMERGENCY MEDICINE

## 2023-03-16 PROCEDURE — 36415 COLL VENOUS BLD VENIPUNCTURE: CPT

## 2023-03-16 PROCEDURE — 80048 BASIC METABOLIC PNL TOTAL CA: CPT

## 2023-03-16 PROCEDURE — 93005 ELECTROCARDIOGRAM TRACING: CPT | Performed by: EMERGENCY MEDICINE

## 2023-03-16 RX ORDER — NITROGLYCERIN 0.4 MG/1
0.4 TABLET SUBLINGUAL EVERY 5 MIN PRN
Status: DISCONTINUED | OUTPATIENT
Start: 2023-03-16 | End: 2023-03-16 | Stop reason: ALTCHOICE

## 2023-03-16 RX ORDER — SODIUM CHLORIDE 9 MG/ML
INJECTION, SOLUTION INTRAVENOUS CONTINUOUS
Status: DISCONTINUED | OUTPATIENT
Start: 2023-03-16 | End: 2023-03-17 | Stop reason: HOSPADM

## 2023-03-16 RX ORDER — METOPROLOL TARTRATE 5 MG/5ML
5 INJECTION INTRAVENOUS EVERY 5 MIN PRN
Status: DISCONTINUED | OUTPATIENT
Start: 2023-03-16 | End: 2023-03-16 | Stop reason: ALTCHOICE

## 2023-03-16 RX ORDER — ATROPINE SULFATE 0.1 MG/ML
0.5 INJECTION INTRAVENOUS EVERY 5 MIN PRN
Status: DISCONTINUED | OUTPATIENT
Start: 2023-03-16 | End: 2023-03-16 | Stop reason: ALTCHOICE

## 2023-03-16 RX ORDER — ALBUTEROL SULFATE 90 UG/1
2 AEROSOL, METERED RESPIRATORY (INHALATION) PRN
Status: DISCONTINUED | OUTPATIENT
Start: 2023-03-16 | End: 2023-03-16 | Stop reason: ALTCHOICE

## 2023-03-16 RX ORDER — SODIUM CHLORIDE 0.9 % (FLUSH) 0.9 %
5-40 SYRINGE (ML) INJECTION PRN
Status: DISCONTINUED | OUTPATIENT
Start: 2023-03-16 | End: 2023-03-16 | Stop reason: ALTCHOICE

## 2023-03-16 RX ORDER — SODIUM CHLORIDE 9 MG/ML
500 INJECTION, SOLUTION INTRAVENOUS CONTINUOUS PRN
Status: DISCONTINUED | OUTPATIENT
Start: 2023-03-16 | End: 2023-03-16 | Stop reason: ALTCHOICE

## 2023-03-16 RX ADMIN — SODIUM CHLORIDE, PRESERVATIVE FREE 10 ML: 5 INJECTION INTRAVENOUS at 12:09

## 2023-03-16 RX ADMIN — NITROFURANTOIN MONOHYDRATE/MACROCRYSTALLINE 100 MG: 25; 75 CAPSULE ORAL at 08:14

## 2023-03-16 RX ADMIN — SODIUM CHLORIDE, PRESERVATIVE FREE 10 ML: 5 INJECTION INTRAVENOUS at 08:15

## 2023-03-16 RX ADMIN — NITROFURANTOIN MONOHYDRATE/MACROCRYSTALLINE 100 MG: 25; 75 CAPSULE ORAL at 20:04

## 2023-03-16 RX ADMIN — SODIUM CHLORIDE 250 ML: 9 INJECTION, SOLUTION INTRAVENOUS at 12:13

## 2023-03-16 RX ADMIN — SODIUM CHLORIDE: 9 INJECTION, SOLUTION INTRAVENOUS at 17:20

## 2023-03-16 ASSESSMENT — PAIN SCALES - GENERAL: PAINLEVEL_OUTOF10: 0

## 2023-03-16 NOTE — H&P
[facility]  CDU / OBSERVATION ENCOUNTER  ATTENDING NOTE     Pt Name: Malgorzata Valdez  MRN: [id number]  [place] 1985  Date of evaluation: 3/16/23  Patient's PCP is :  [address] [place] [address]       Chief Complaint   Patient presents with    Shortness of Breath    Palpitations         HISTORY OF PRESENT ILLNESS    Malgorzata Valdez is a 41 y.o. female past medical history of hypothyroidism, rheumatoid arthritis, presents to the emergency department with dizziness and lightheadedness. These appear to been precipitated with standing, movement and thus presents to the emergency department for further assessment. She had unremarkable vital signs in the emergency department. She did have a brief episode of mild tachycardia to 107 which did resolve thereafter. BMP, LFT, magnesium, TSH, D-dimer, troponin unremarkable. CBC demonstrating WBC count of 29.5. Normal hemoglobin. Chest x-ray negative. Urinalysis is suggestive of UTI. Was given a dose of Macrobid, 1 L IV fluid. Repeat CBC demonstrating WBC 21.6. Admitted to the observation unit for further assessment. History was obtained in part through review of the ED chart.  When possible, a direct discussion was had with ED nurses, residents, and attendings  REVIEW OF SYSTEMS       General ROS - No fevers, No malaise , +DIzziness  Ophthalmic ROS - No discharge, No changes in vision  ENT ROS -  No sore throat, No rhinorrhea,   Respiratory ROS - no shortness of breath, no cough, no  wheezing  Cardiovascular ROS - No chest pain, no dyspnea on exertion  Gastrointestinal ROS - No abdominal pain, no nausea or vomiting, no change in bowel habits, no black or bloody stools  Genito-Urinary ROS - No dysuria, trouble voiding, or hematuria  Musculoskeletal ROS - No myalgias, No arthalgias  Neurological ROS - No headache, no dizziness/lightheadedness, No focal weakness, no loss of sensation  Dermatological ROS - No lesions, No rash (PQRS) Advance directives on face sheet per hospital policy. No change unless specifically mentioned in chart    PAST MEDICAL HISTORY    has a past medical history of Cervical shortening, Hashimoto's disease, Hypoactive thyroid, and Pap smear of cervix with ASCUS, cannot exclude HGSIL. I have reviewed the past medical history with the patient and it is pertinent to this complaint. SURGICAL HISTORY      has a past surgical history that includes LEEP (2010); Dilation & curettage; Cervical Cerclage (4-11-15); other surgical history (10/19/15); and Insertion of contraceptive capsule (Left, 10/01/2015). I have reviewed and agree with Surgical History entered and it is pertinent to this complaint. CURRENT MEDICATIONS     sodium chloride flush 0.9 % injection 5-40 mL, 2 times per day  sodium chloride flush 0.9 % injection 5-40 mL, PRN  0.9 % sodium chloride infusion, PRN  enoxaparin (LOVENOX) injection 40 mg, Daily  ondansetron (ZOFRAN-ODT) disintegrating tablet 4 mg, Q8H PRN   Or  ondansetron (ZOFRAN) injection 4 mg, Q6H PRN  polyethylene glycol (GLYCOLAX) packet 17 g, Daily PRN  acetaminophen (TYLENOL) tablet 650 mg, Q6H PRN   Or  acetaminophen (TYLENOL) suppository 650 mg, Q6H PRN  nitrofurantoin (macrocrystal-monohydrate) (MACROBID) capsule 100 mg, 2 times per day        All medication charted and reviewed. ALLERGIES     has No Known Allergies. FAMILY HISTORY     She indicated that her mother is alive. She indicated that her father is alive. She indicated that the status of her maternal grandmother is unknown. She indicated that the status of her maternal grandfather is unknown. She indicated that her paternal grandmother is . She indicated that her paternal grandfather is . family history includes COPD in her father and paternal grandmother; Cancer in her paternal grandfather; Emphysema in her father; Other in her father; Thyroid Disease in her mother.   The patient denies any pertinent family history. I have reviewed and agree with the family history entered. I have reviewed the Family History and it is not significant to the case    SOCIAL HISTORY      reports that she has been smoking cigarettes. She has a 6.50 pack-year smoking history. She has never used smokeless tobacco. She reports current alcohol use. She reports that she does not use drugs. I have reviewed and agree with all Social.  There are no concerns for substance abuse/use. PHYSICAL EXAM     INITIAL VITALS:  height is 5' 3\" (1.6 m) and weight is 130 lb (59 kg). Her oral temperature is 97.7 °F (36.5 °C). Her blood pressure is 113/71 and her pulse is 82. Her respiration is 23 and oxygen saturation is 99%.       CONSTITUTIONAL: AOx4, no apparent distress, appears stated age    HEAD: normocephalic, atraumatic   EYES: PERRLA, EOMI    ENT: moist mucous membranes, uvula midline   NECK: supple, symmetric   BACK: symmetric   LUNGS: clear to auscultation bilaterally   CARDIOVASCULAR: regular rate and rhythm, no murmurs, rubs or gallops   ABDOMEN: soft, non-tender, non-distended with normal active bowel sounds   NEUROLOGIC:  MAEx4, no focal sensory or motor deficits   MUSCULOSKELETAL: no clubbing, cyanosis or edema   SKIN: no rash or wounds          DIAGNOSTIC RESULTS     EKG: All EKG's are interpreted by the Observation Physician who either signs or Co-signs this chart in the absence of a cardiologist.    EKG Interpretation    Interpreted by observation physician    Rhythm: normal sinus   Rate: normal  Axis: normal  Ectopy: none  Conduction: normal  ST Segments: no acute change  T Waves: no acute change  Q Waves: none    Clinical Impression: no acute changes    Litzy Perry MD        RADIOLOGY:   I directly visualized the following  images and reviewed the radiologist interpretations:    XR CHEST PORTABLE    Result Date: 3/15/2023  EXAMINATION: ONE X-RAY VIEW OF THE CHEST 3/15/2023 2:59 pm COMPARISON: September 7, 2014 HISTORY: ORDERING SYSTEM PROVIDED HISTORY:  Palpitations, shortness of breath. TECHNOLOGIST PROVIDED HISTORY: Palpitations, shortness of breath. Reason for Exam:  Port upright. FINDINGS: The cardiomediastinal silhouette is normal in size. The lungs are clear. No pleural effusion or pneumothorax is present. No acute cardiopulmonary process. LABS:  I have reviewed and interpreted all available lab results. Labs Reviewed   CBC WITH AUTO DIFFERENTIAL - Abnormal; Notable for the following components:       Result Value    WBC 29.5 (*)     Immature Granulocytes 3 (*)     Seg Neutrophils 78 (*)     Lymphocytes 11 (*)     Monocytes 8 (*)     Eosinophils % 0 (*)     Absolute Immature Granulocyte 0.89 (*)     Segs Absolute 23.00 (*)     Absolute Mono # 2.36 (*)     All other components within normal limits   URINALYSIS WITH REFLEX TO CULTURE - Abnormal; Notable for the following components:    Turbidity UA Cloudy (*)     Protein, UA TRACE (*)     Leukocyte Esterase, Urine SMALL (*)     All other components within normal limits   HEPATIC FUNCTION PANEL - Abnormal; Notable for the following components:     Total Bilirubin 0.2 (*)     All other components within normal limits   BASIC METABOLIC PANEL W/ REFLEX TO MG FOR LOW K - Abnormal; Notable for the following components:    Glucose 100 (*)     All other components within normal limits   CBC WITH AUTO DIFFERENTIAL - Abnormal; Notable for the following components:    WBC 21.6 (*)     RBC 3.60 (*)     Hematocrit 35.9 (*)     MCH 33.9 (*)     Platelets 125 (*)     Seg Neutrophils 83 (*)     Lymphocytes 9 (*)     Eosinophils % 0 (*)     Immature Granulocytes 2 (*)     Segs Absolute 17.99 (*)     Absolute Immature Granulocyte 0.35 (*)     All other components within normal limits   COVID-19, RAPID   CULTURE, BLOOD 1   CULTURE, BLOOD 1   CULTURE, URINE   BASIC METABOLIC PANEL   MAGNESIUM   TROPONIN   TSH   T4, FREE   MICROSCOPIC URINALYSIS   D-DIMER, QUANTITATIVE LACTATE, SEPSIS         CDU IMPRESSION / Zarina Nageotte is a 41 y.o. female who presents with    UTI  - Culture sent  - Nitrofurantoin 100 twice daily    Dizziness  - Negative ECG, Dimer, troponin; normal TSH   - Cardio consultation      Continue home medications and pain control  Monitor vitals, labs, and imaging  DISPO: pending consults and clinical improvement    CONSULTS:    IP CONSULT TO CARDIOLOGY    PROCEDURES:  Not indicated       PATIENT REFERRED TO:    No follow-up provider specified. --  Luc Bojorquez MD   Emergency Medicine Attending    This dictation was generated by voice recognition computer software. Although all attempts are made to edit the dictation for accuracy, there may be errors in the transcription that are not intended.

## 2023-03-16 NOTE — PROGRESS NOTES
Treadmill exercise stress test only ordered for pt per cardiology. Pt has only one troponin level so far. Paddy Matson CNP states OK to proceed with stress test today. Pt has been NPO. Taken per wheel chair from room 346-2 to stress lab.

## 2023-03-16 NOTE — PROGRESS NOTES
[address]  CDU / OBSERVATION ENCOUNTER  ATTENDING NOTE       I performed a history and physical examination of the patient and discussed management with the resident or midlevel provider. I reviewed the resident or midlevel provider's note and agree with the documented findings and plan of care. Any areas of disagreement are noted on the chart. I was personally present for the key portions of any procedures. I have documented in the chart those procedures where I was not present during the key portions. I have reviewed the nurses notes. I agree with the chief complaint, past medical history, past surgical history, allergies, medications, social and family history as documented unless otherwise noted below. The Family history, social history, and ROS are effectively unchanged since admission unless noted elsewhere in the chart. This patient was placed in the observation unit for reevaluation for possible admission to the hospital    Patient with complaints of chest discomfort and palpitations. Patient admitted for further cardiac evaluation and rule out. Patient was improved at the time of my evaluation of her but she had significant palpitations earlier and was seen by cardiology. Cardiology had ordered stress testing. Awaiting results. Patient requiring ongoing monitoring. Patient with concerns for arrhythmia. Patient also has urinary tract infection with significant leukocytosis. Patient's leukocytosis is significantly high over 20. Patient receiving IV fluids. Blood cultures been drawn. Recheck on CBC shows persistence of white count. Patient does not appear septic but does require ongoing monitoring and recheck on laboratory work. Patient is a mission appropriate due to need for close monitoring for arrhythmia and need to follow blood cultures in addition to abnormal CBC showing leftward shift requiring ongoing monitoring for sepsis and infection.     [place] Que Israel MD  Attending Emergency  Physician

## 2023-03-16 NOTE — PROCEDURES
[facility]                              CARDIAC STRESS TEST    PATIENT NAME: Sam Tineo                    :        1985  MED REC NO:   [id number]                             ROOM:       [id number]  ACCOUNT NO:   [de-identified]                           ADMIT DATE: 03/15/2023  PROVIDER:     Kayleigh Elam    DATE OF STUDY:  2023    ORDERING PROVIDER: Dr. Araceli Wilburn PROVIDER: Jane Berry: Dr. John Taylor: Chest pain    Procedure explained and consent signed. Resting heart rate:  91 bpm  Resting blood pressure: 111/82 mm/Hg  Maximum heart rate:  166 bpm, or 90% of age predicted maximum heart rate  Peak blood pressure:  129/85 mm/Hg  Duration: 7:37  METS: 9.5  Reason for termination: Fatigue  Resting EKG: Normal  Stress heart response: Normal response  Stress BP response: Appropriate  Stress EKG'S: Normal  Chest discomfort: None  Ischemic EKG changes: None    IMPRESSION:    Electrocardiographically Negative treadmill stress study.         Avelina Madsen    D: 2023 14:55:32       T: 2023 14:59:33     [id number]  Job#: [id number]     Doc#: Unknown    CC:

## 2023-03-16 NOTE — CARE COORDINATION
Case Management Assessment  Initial Evaluation    Date/Time of Evaluation: 3/16/2023 3:36 PM  Assessment Completed by: Jessica El RN    If patient is discharged prior to next notation, then this note serves as note for discharge by case management. Patient Name: Raul Desai                   YOB: 1985  Diagnosis: UTI (urinary tract infection) [N39.0]  Urinary tract infection without hematuria, site unspecified [N39.0]  Leukocytosis, unspecified type [D72.829]  Dyspnea, unspecified type [R06.00]                   Date / Time: 3/15/2023  2:36 PM    Patient Admission Status: Observation   Readmission Risk (Low < 19, Mod (19-27), High > 27): No data recorded  Current PCP: Shannon Salazar  PCP verified by CM? (P) Yes    Chart Reviewed: Yes      History Provided by: (P) Patient  Patient Orientation: (P) Alert and Oriented    Patient Cognition: (P) Alert    Hospitalization in the last 30 days (Readmission):  No    If yes, Readmission Assessment in CM Navigator will be completed.     Advance Directives:      Code Status: Full Code   Patient's Primary Decision Maker is:        Discharge Planning:    Patient lives with: (P) Children Type of Home: House  Primary Care Giver: (P) Self  Patient Support Systems include: (P) Family Members, Children   Current Financial resources:    Current community resources:    Current services prior to admission: (P) None            Current DME:              Type of Home Care services:  None    ADLS  Prior functional level: (P) Independent in ADLs/IADLs  Current functional level: (P) Independent in ADLs/IADLs    PT AM-PAC:   /24  OT AM-PAC:   /24    Family can provide assistance at DC: (P) Yes  Would you like Case Management to discuss the discharge plan with any other family members/significant others, and if so, who? (P) No  Plans to Return to Present Housing: (P) Yes  Other Identified Issues/Barriers to RETURNING to current housing: none identified Potential Assistance needed at discharge: (P) N/A            Potential DME:    Patient expects to discharge to: [address] [place] [address] for transportation at discharge:      Financial    Payor: Joanne / Plan: Joanne / Product Type: *No Product type* /     Does insurance require precert for SNF: Yes    Potential assistance Purchasing Medications: (P) No  Meds-to-Beds request: Yes      [facility] [id number] - [address] [facility] [phone] - F [phone]  [address]  Phone: [phone] Fax: [phone]      Notes:    Factors facilitating achievement of predicted outcomes: Cooperative and Pleasant    Barriers to discharge: Pain    Additional Case Management Notes: patient plans to return home independently     The Plan for Transition of Care is related to the following treatment goals of UTI (urinary tract infection) [N39.0]  Urinary tract infection without hematuria, site unspecified [N39.0]  Leukocytosis, unspecified type [D72.829]  Dyspnea, unspecified type [S44.60]    IF APPLICABLE: The Patient and/or patient representative Kasey and her family were provided with a choice of provider and agrees with the discharge plan. Freedom of choice list with basic dialogue that supports the patient's individualized plan of care/goals and shares the quality data associated with the providers was provided to:     Patient Representative Name:       The Patient and/or Patient Representative Agree with the Discharge Plan?       Severo Montes, RN  Case Management Department  Ph: [phone]

## 2023-03-16 NOTE — CONSULTS
[facility]    Consult / H&P               Today's Date: 3/16/2023  Patient Name: Abner Rojas  Date of admission: 3/15/2023  2:36 PM  Patient's age: 41 y.o., 1985  Admission Dx: UTI (urinary tract infection) [N39.0]  Urinary tract infection without hematuria, site unspecified [N39.0]  Leukocytosis, unspecified type [D72.829]  Dyspnea, unspecified type [R06.00]    Reason for Consult:  Cardiac evaluation    Requesting Physician: Enedina Jasso MD    CHIEF COMPLAINT:  SOB, Palpitations    History Obtained From:  patient, electronic medical record    HISTORY OF PRESENT ILLNESS:      The patient is a 41 y.o. female with a past medical history of rheumatoid arthritis and Hashimoto's thyroiditis presenting with palpitations, dizziness, lightheadedness, shortness of breath for the past several days. Normally the symptoms occur when patient is standing or walking however they occurred while she was sitting at work and it was concerning to her. She also states that she will sometimes feel flushed in the face. She also endorses increased urinary frequency however denies any issues with urination including pain, odor, or difficulties with urination. She also denies any recent sick contacts as well as fever, chills, cough, night sweats, and diarrhea. Cardiology was consulted due to intermittent episodes of palpitations and shortness of breath. Past Medical History:   has a past medical history of Cervical shortening, Hashimoto's disease, Hypoactive thyroid, and Pap smear of cervix with ASCUS, cannot exclude HGSIL. Past Surgical History:   has a past surgical history that includes LEEP (06/21/2010); Dilation & curettage; Cervical Cerclage (4-11-15); other surgical history (10/19/15); and Insertion of contraceptive capsule (Left, 10/01/2015). Home Medications:    Prior to Admission medications    Medication Sig Start Date End Date Taking?  Authorizing Provider   methotrexate ([address] [facility]) 2.5 MG chemo tablet Take by mouth once a week 20 mg 1x a week. 2/12/22  Yes Historical Provider, MD   folic acid (FOLVITE) 1 MG tablet Take 2 mg by mouth daily   Yes Historical Provider, MD   amLODIPine-atorvastatatin (CADUET) 5-10 MG per tablet Take 1 tablet by mouth daily   Yes Historical Provider, MD      Current Facility-Administered Medications: sodium chloride flush 0.9 % injection 5-40 mL, 5-40 mL, IntraVENous, 2 times per day  sodium chloride flush 0.9 % injection 5-40 mL, 5-40 mL, IntraVENous, PRN  0.9 % sodium chloride infusion, , IntraVENous, PRN  enoxaparin (LOVENOX) injection 40 mg, 40 mg, SubCUTAneous, Daily  ondansetron (ZOFRAN-ODT) disintegrating tablet 4 mg, 4 mg, Oral, Q8H PRN **OR** ondansetron (ZOFRAN) injection 4 mg, 4 mg, IntraVENous, Q6H PRN  polyethylene glycol (GLYCOLAX) packet 17 g, 17 g, Oral, Daily PRN  acetaminophen (TYLENOL) tablet 650 mg, 650 mg, Oral, Q6H PRN **OR** acetaminophen (TYLENOL) suppository 650 mg, 650 mg, Rectal, Q6H PRN  nitrofurantoin (macrocrystal-monohydrate) (MACROBID) capsule 100 mg, 100 mg, Oral, 2 times per day    Allergies:  Patient has no known allergies. Social History:   reports that she has been smoking cigarettes. She has a 6.50 pack-year smoking history. She has never used smokeless tobacco. She reports current alcohol use. She reports that she does not use drugs. Family History: family history includes COPD in her father and paternal grandmother; Cancer in her paternal grandfather; Emphysema in her father; Other in her father; Thyroid Disease in her mother. REVIEW OF SYSTEMS:    Constitutional:  Positive for fatigue. Negative for chills and fever. Respiratory:  Positive for shortness of breath. Negative for cough and chest tightness. Cardiovascular:  Negative for chest pain. Gastrointestinal:  Negative for abdominal pain, nausea and vomiting. Genitourinary:  Positive for frequency. Negative for difficulty urinating and dysuria.    Skin: Negative for color change and rash. Allergic/Immunologic: Negative for environmental allergies and food allergies. Neurological:  Negative for dizziness, weakness, light-headedness and headaches. Psychiatric/Behavioral:  Negative for confusion. PHYSICAL EXAM:      /71   Pulse 82   Temp 97.7 °F (36.5 °C) (Oral)   Resp 23   Ht 5' 3\" (1.6 m)   Wt 130 lb (59 kg)   SpO2 99%   BMI 23.03 kg/m²    Constitutional:       Appearance: Normal appearance. HENT:      Head: Normocephalic and atraumatic. Cardiovascular:      Rate and Rhythm: Normal rate and regular rhythm. Pulses: Normal pulses. Heart sounds: Normal heart sounds. Pulmonary:      Effort: Pulmonary effort is normal.      Breath sounds: Normal breath sounds. Abdominal:      General: There is no distension. Palpations: Abdomen is soft. Tenderness: There is no abdominal tenderness. Musculoskeletal:      Right lower leg: No edema. Left lower leg: No edema. Skin:     General: Skin is warm. Capillary Refill: Capillary refill takes less than 2 seconds. Neurological:      General: No focal deficit present. Mental Status: She is alert and oriented to person, place, and time. Psychiatric:         Mood and Affect: Mood normal.     DATA:    Diagnostics:    EKG:   3/15/2023  Normal sinus rhythm  Normal ECG    ECHO:   1/01/2014  Left ventricular systolic function and size are normal. Ejection fraction is   estimated at 55%. Mild tricuspid regurgitation. No pericardial effusion. Stress Test:     Cardiac Angiography    Labs:     CBC:   Recent Labs     03/15/23  1448 03/16/23  0510   WBC 29.5* 21.6*   HGB 12.8 12.2   HCT 38.1 35.9*    603*     BMP:   Recent Labs     03/15/23  1448 03/16/23  0510    135   K 4.0 4.4   CO2 25 22   BUN 13 14   CREATININE 0.61 0.61   LABGLOM >60 >60   GLUCOSE 87 100*     BNP: No results for input(s): BNP in the last 72 hours.   PT/INR: No results for input(s): PROTIME, INR in the last 72 hours. APTT:No results for input(s): APTT in the last 72 hours. CARDIAC ENZYMES:No results for input(s): CKTOTAL, CKMB, CKMBINDEX, TROPONINI in the last 72 hours. FASTING LIPID PANEL:No results found for: HDL, LDLDIRECT, LDLCALC, TRIG  LIVER PROFILE:  Recent Labs     03/15/23  1448   AST 15   ALT 13   LABALBU 4.6     IMPRESSION:    Stable Angina  Atypical Chest Pain  Rheumatoid Arthritis  Hashimoto's Thyroiditis  Urinary Tract Infection  Pheochromocytoma? ?    Patient Active Problem List   Diagnosis    Hypothyroid    H/O LEEP (2009)    S/P BMZ 4/11, 4/12     Acute nonintractable headache    Right carpal tunnel syndrome    UTI (urinary tract infection)     RECOMMENDATIONS:  Plan for stress test today. Patient is agreeable and NPO. Symptomatic management per primary. Will follow. Thank you for consultation. Discussed with patient and Nurse. Further recommendations after discussion with attending. Ed Mott MD  Cardiology  PGY-3, Internal Medicine Resident  [facility]  3/16/2023 9:21 AM'    Attending note,   Patient was seen and examined agree with the evaluation and plan above. UTI. Chest pain. More with exertion. ECG is normal.  Troponins negative.   We will plan for routine treadmill stress test.

## 2023-03-17 VITALS
WEIGHT: 130 LBS | RESPIRATION RATE: 18 BRPM | HEART RATE: 78 BPM | HEIGHT: 63 IN | SYSTOLIC BLOOD PRESSURE: 96 MMHG | TEMPERATURE: 97.9 F | DIASTOLIC BLOOD PRESSURE: 63 MMHG | BODY MASS INDEX: 23.04 KG/M2 | OXYGEN SATURATION: 95 %

## 2023-03-17 LAB
ABSOLUTE EOS #: 0.31 K/UL (ref 0–0.4)
ABSOLUTE IMMATURE GRANULOCYTE: 0.92 K/UL (ref 0–0.3)
ABSOLUTE LYMPH #: 3.67 K/UL (ref 1–4.8)
ABSOLUTE MONO #: 1.07 K/UL (ref 0.1–0.8)
BASOPHILS # BLD: 0 % (ref 0–2)
BASOPHILS ABSOLUTE: 0 K/UL (ref 0–0.2)
EKG ATRIAL RATE: 105 BPM
EKG ATRIAL RATE: 60 BPM
EKG P AXIS: 34 DEGREES
EKG P AXIS: 73 DEGREES
EKG P-R INTERVAL: 118 MS
EKG P-R INTERVAL: 138 MS
EKG Q-T INTERVAL: 330 MS
EKG Q-T INTERVAL: 406 MS
EKG QRS DURATION: 68 MS
EKG QRS DURATION: 86 MS
EKG QTC CALCULATION (BAZETT): 406 MS
EKG QTC CALCULATION (BAZETT): 436 MS
EKG R AXIS: 64 DEGREES
EKG R AXIS: 68 DEGREES
EKG T AXIS: 52 DEGREES
EKG T AXIS: 57 DEGREES
EKG VENTRICULAR RATE: 105 BPM
EKG VENTRICULAR RATE: 60 BPM
EOSINOPHILS RELATIVE PERCENT: 2 % (ref 1–4)
HCT VFR BLD AUTO: 35.7 % (ref 36.3–47.1)
HGB BLD-MCNC: 11.7 G/DL (ref 11.9–15.1)
IMMATURE GRANULOCYTES: 6 %
LYMPHOCYTES # BLD: 24 % (ref 24–44)
MCH RBC QN AUTO: 31.5 PG (ref 25.2–33.5)
MCHC RBC AUTO-ENTMCNC: 32.8 G/DL (ref 28.4–34.8)
MCV RBC AUTO: 96.2 FL (ref 82.6–102.9)
MONOCYTES # BLD: 7 % (ref 1–7)
MORPHOLOGY: NORMAL
NRBC AUTOMATED: 0 PER 100 WBC
PDW BLD-RTO: 13.3 % (ref 11.8–14.4)
PLATELET # BLD AUTO: 314 K/UL (ref 138–453)
PMV BLD AUTO: 9.6 FL (ref 8.1–13.5)
RBC # BLD: 3.71 M/UL (ref 3.95–5.11)
SEG NEUTROPHILS: 61 % (ref 36–66)
SEGMENTED NEUTROPHILS ABSOLUTE COUNT: 9.33 K/UL (ref 1.8–7.7)
WBC # BLD AUTO: 15.3 K/UL (ref 3.5–11.3)

## 2023-03-17 PROCEDURE — 6370000000 HC RX 637 (ALT 250 FOR IP): Performed by: EMERGENCY MEDICINE

## 2023-03-17 PROCEDURE — 93010 ELECTROCARDIOGRAM REPORT: CPT | Performed by: INTERNAL MEDICINE

## 2023-03-17 PROCEDURE — 36415 COLL VENOUS BLD VENIPUNCTURE: CPT

## 2023-03-17 PROCEDURE — 85025 COMPLETE CBC W/AUTO DIFF WBC: CPT

## 2023-03-17 RX ORDER — ONDANSETRON 4 MG/1
4 TABLET, ORALLY DISINTEGRATING ORAL EVERY 8 HOURS PRN
Qty: 15 TABLET | Refills: 0 | Status: SHIPPED | OUTPATIENT
Start: 2023-03-17 | End: 2023-03-22

## 2023-03-17 RX ORDER — NITROFURANTOIN 25; 75 MG/1; MG/1
100 CAPSULE ORAL EVERY 12 HOURS SCHEDULED
Qty: 10 CAPSULE | Refills: 0 | Status: SHIPPED | OUTPATIENT
Start: 2023-03-17 | End: 2023-03-22

## 2023-03-17 RX ADMIN — NITROFURANTOIN MONOHYDRATE/MACROCRYSTALLINE 100 MG: 25; 75 CAPSULE ORAL at 07:38

## 2023-03-17 NOTE — DISCHARGE SUMMARY
CDU Discharge Summary        Patient: Tiara Zapata  YOB: 1985    MRN: [id number]   Acct: [de-identified]    Primary Care Physician: Benjamin Strange    Admit date:  3/15/2023  2:36 PM  Discharge date: 11:54 AM 3/17/2023    Discharge Diagnoses:     1.) Acute cystitis, treated with nitrofurantoin, decreased in WBC, symptoms unchanged. Recommend outpatient CBC and PCP follow up     2.) acute dizziness, consult to cardiology and patient underwent stress test that was negative. Orthostatics negative. Recommend outpatient follow up with PCP. Patient states symptoms resolved. Follow-up:  Call today/tomorrow for a follow up appointment with Benjamin Strange , or return to the Emergency Room with worsening symptoms    Stressed to patient the importance of following up with primary care doctor for further workup/management of symptoms. Pt verbalizes understanding and agrees with plan.     Discharge Medication Changes:       Medication List        START taking these medications      nitrofurantoin (macrocrystal-monohydrate) 100 MG capsule  Commonly known as: MACROBID  Take 1 capsule by mouth every 12 hours for 10 doses     ondansetron 4 MG disintegrating tablet  Commonly known as: ZOFRAN-ODT  Take 1 tablet by mouth every 8 hours as needed for Nausea or Vomiting            CONTINUE taking these medications      amLODIPine-atorvastatatin 5-10 MG per tablet  Commonly known as: CADUET     folic acid 1 MG tablet  Commonly known as: FOLVITE     methotrexate 2.5 MG chemo tablet  Commonly known as: RHEUMATREX            STOP taking these medications      ibuprofen 800 MG tablet  Commonly known as: ADVIL;MOTRIN     SUMAtriptan 50 MG tablet  Commonly known as: IMITREX               Where to Get Your Medications        These medications were sent to [facility] [address] [facility], [address] [place] [address] [address], [place] [address]      Phone: [phone] nitrofurantoin (macrocrystal-monohydrate) 100 MG capsule  ondansetron 4 MG disintegrating tablet         Diet:  ADULT DIET; Regular, advance as tolerated     Activity:  As tolerated    Consultants: IP CONSULT TO CARDIOLOGY    Procedures:  Not indicated     Diagnostic Test:   Results for orders placed or performed during the hospital encounter of 03/15/23   COVID-19, Rapid    Specimen: Nasopharyngeal Swab   Result Value Ref Range    Specimen Description . NASOPHARYNGEAL SWAB     SARS-CoV-2, Rapid Not Detected Not Detected   Culture, Urine    Specimen: Urine   Result Value Ref Range    Specimen Description . URINE     Culture NO GROWTH    Culture, Blood 1    Specimen: Blood   Result Value Ref Range    Specimen Description . BLOOD     Special Requests RT HAND 3ML     Culture NO GROWTH 1 DAY    Culture, Blood 1    Specimen: Blood   Result Value Ref Range    Specimen Description . BLOOD     Special Requests LFT HAND 1ML     Culture NO GROWTH 1 DAY    CBC with Auto Differential   Result Value Ref Range    WBC 29.5 (H) 3.5 - 11.3 k/uL    RBC 4.03 3.95 - 5.11 m/uL    Hemoglobin 12.8 11.9 - 15.1 g/dL    Hematocrit 38.1 36.3 - 47.1 %    MCV 94.5 82.6 - 102.9 fL    MCH 31.8 25.2 - 33.5 pg    MCHC 33.6 28.4 - 34.8 g/dL    RDW 13.2 11.8 - 14.4 %    Platelets 451 576 - 758 k/uL    MPV 9.3 8.1 - 13.5 fL    NRBC Automated 0.0 0.0 per 100 WBC    Immature Granulocytes 3 (H) 0 %    Seg Neutrophils 78 (H) 36 - 66 %    Lymphocytes 11 (L) 24 - 44 %    Monocytes 8 (H) 1 - 7 %    Eosinophils % 0 (L) 1 - 4 %    Basophils 0 0 - 2 %    Absolute Immature Granulocyte 0.89 (H) 0.00 - 0.30 k/uL    Segs Absolute 23.00 (H) 1.8 - 7.7 k/uL    Absolute Lymph # 3.25 1.0 - 4.8 k/uL    Absolute Mono # 2.36 (H) 0.1 - 0.8 k/uL    Absolute Eos # 0.00 0.0 - 0.4 k/uL    Basophils Absolute 0.00 0.0 - 0.2 k/uL    Morphology Normal    Basic Metabolic Panel   Result Value Ref Range    Glucose 87 70 - 99 mg/dL    BUN 13 6 - 20 mg/dL    Creatinine 0.61 0.50 - 0.90 mg/dL    Est, Glom Filt Rate >60 >60 mL/min/1.73m2    Calcium 9.3 8.6 - 10.4 mg/dL    Sodium 138 135 - 144 mmol/L    Potassium 4.0 3.7 - 5.3 mmol/L    Chloride 101 98 - 107 mmol/L    CO2 25 20 - 31 mmol/L    Anion Gap 12 9 - 17 mmol/L   Magnesium   Result Value Ref Range    Magnesium 2.0 1.6 - 2.6 mg/dL   Troponin   Result Value Ref Range    Troponin, High Sensitivity <6 0 - 14 ng/L   TSH   Result Value Ref Range    TSH 1.07 0.30 - 5.00 uIU/mL   T4, Free   Result Value Ref Range    Thyroxine, Free 0.95 0.93 - 1.70 ng/dL   Urinalysis with Reflex to Culture    Specimen: Urine   Result Value Ref Range    Color, UA Yellow Yellow    Turbidity UA Cloudy (A) Clear    Glucose, Ur NEGATIVE NEGATIVE    Bilirubin Urine NEGATIVE NEGATIVE    Ketones, Urine NEGATIVE NEGATIVE    Specific Gravity, UA 1.024 1.005 - 1.030    Urine Hgb NEGATIVE NEGATIVE    pH, UA 7.0 5.0 - 8.0    Protein, UA TRACE (A) NEGATIVE    Urobilinogen, Urine Normal Normal    Nitrite, Urine NEGATIVE NEGATIVE    Leukocyte Esterase, Urine SMALL (A) NEGATIVE   Microscopic Urinalysis   Result Value Ref Range    WBC, UA 20 TO 50 0 - 5 /HPF    RBC, UA 0 TO 2 0 - 4 /HPF    Casts UA  0 - 8 /LPF     10 TO 20 HYALINE Reference range defined for non-centrifuged specimen.     Epithelial Cells UA None 0 - 5 /HPF   D-Dimer, Quantitative   Result Value Ref Range    D-Dimer, Quant <0.17 mg/L FEU   Lactate, Sepsis   Result Value Ref Range    Lactic Acid, Sepsis, Whole Blood 1.1 0.5 - 1.9 mmol/L   Hepatic Function Panel   Result Value Ref Range    Albumin 4.6 3.5 - 5.2 g/dL    Alkaline Phosphatase 86 35 - 104 U/L    ALT 13 5 - 33 U/L    AST 15 <32 U/L    Total Bilirubin 0.2 (L) 0.3 - 1.2 mg/dL    Bilirubin, Direct <0.1 <0.3 mg/dL    Bilirubin, Indirect Can not be calculated 0.0 - 1.0 mg/dL    Total Protein 7.5 6.4 - 8.3 g/dL    Albumin/Globulin Ratio 1.6 1.0 - 2.5   Basic Metabolic Panel w/ Reflex to MG   Result Value Ref Range    Glucose 100 (H) 70 - 99 mg/dL    BUN 14 6 - 20 mg/dL    Creatinine 0.61 0.50 - 0.90 mg/dL    Est, Glom Filt Rate >60 >60 mL/min/1.73m2    Calcium 8.9 8.6 - 10.4 mg/dL    Sodium 135 135 - 144 mmol/L    Potassium 4.4 3.7 - 5.3 mmol/L    Chloride 103 98 - 107 mmol/L    CO2 22 20 - 31 mmol/L    Anion Gap 10 9 - 17 mmol/L   CBC with Auto Differential   Result Value Ref Range    WBC 21.6 (H) 3.5 - 11.3 k/uL    RBC 3.60 (L) 3.95 - 5.11 m/uL    Hemoglobin 12.2 11.9 - 15.1 g/dL    Hematocrit 35.9 (L) 36.3 - 47.1 %    MCV 99.7 82.6 - 102.9 fL    MCH 33.9 (H) 25.2 - 33.5 pg    MCHC 34.0 28.4 - 34.8 g/dL    RDW 13.8 11.8 - 14.4 %    Platelets 706 (H) 917 - 453 k/uL    MPV 10.9 8.1 - 13.5 fL    NRBC Automated 0.0 0.0 per 100 WBC    Seg Neutrophils 83 (H) 36 - 65 %    Lymphocytes 9 (L) 24 - 43 %    Monocytes 6 3 - 12 %    Eosinophils % 0 (L) 1 - 4 %    Basophils 1 0 - 2 %    Immature Granulocytes 2 (H) 0 %    Segs Absolute 17.99 (H) 1.50 - 8.10 k/uL    Absolute Lymph # 1.95 1.10 - 3.70 k/uL    Absolute Mono # 1.18 0.10 - 1.20 k/uL    Absolute Eos # <0.03 0.00 - 0.44 k/uL    Basophils Absolute 0.10 0.00 - 0.20 k/uL    Absolute Immature Granulocyte 0.35 (H) 0.00 - 0.30 k/uL   CBC with Auto Differential   Result Value Ref Range    WBC 22.1 (H) 3.5 - 11.3 k/uL    RBC 4.13 3.95 - 5.11 m/uL    Hemoglobin 13.1 11.9 - 15.1 g/dL    Hematocrit 39.6 36.3 - 47.1 %    MCV 95.9 82.6 - 102.9 fL    MCH 31.7 25.2 - 33.5 pg    MCHC 33.1 28.4 - 34.8 g/dL    RDW 13.2 11.8 - 14.4 %    Platelets 635 429 - 378 k/uL    MPV 9.4 8.1 - 13.5 fL    NRBC Automated 0.0 0.0 per 100 WBC    Immature Granulocytes 2 (H) 0 %    Seg Neutrophils 73 (H) 36 - 65 %    Lymphocytes 15 (L) 24 - 43 %    Monocytes 9 3 - 12 %    Eosinophils % 0 (L) 1 - 4 %    Basophils 1 0 - 2 %    Absolute Immature Granulocyte 0.44 (H) 0.00 - 0.30 k/uL    Segs Absolute 16.13 (H) 1.50 - 8.10 k/uL    Absolute Lymph # 3.32 1.10 - 3.70 k/uL    Absolute Mono # 1.99 (H) 0.10 - 1.20 k/uL    Absolute Eos # 0.00 0.00 - 0.44 k/uL    Basophils Absolute 0.22 (H) 0.00 - 0.20 k/uL    Morphology Normal    Basic Metabolic Panel   Result Value Ref Range    Glucose 114 (H) 70 - 99 mg/dL    BUN 13 6 - 20 mg/dL    Creatinine 0.71 0.50 - 0.90 mg/dL    Est, Glom Filt Rate >60 >60 mL/min/1.73m2    Calcium 9.6 8.6 - 10.4 mg/dL    Sodium 136 135 - 144 mmol/L    Potassium 4.0 3.7 - 5.3 mmol/L    Chloride 101 98 - 107 mmol/L    CO2 26 20 - 31 mmol/L    Anion Gap 9 9 - 17 mmol/L   Magnesium   Result Value Ref Range    Magnesium 2.0 1.6 - 2.6 mg/dL   CBC with Auto Differential   Result Value Ref Range    WBC 15.3 (H) 3.5 - 11.3 k/uL    RBC 3.71 (L) 3.95 - 5.11 m/uL    Hemoglobin 11.7 (L) 11.9 - 15.1 g/dL    Hematocrit 35.7 (L) 36.3 - 47.1 %    MCV 96.2 82.6 - 102.9 fL    MCH 31.5 25.2 - 33.5 pg    MCHC 32.8 28.4 - 34.8 g/dL    RDW 13.3 11.8 - 14.4 %    Platelets 031 042 - 401 k/uL    MPV 9.6 8.1 - 13.5 fL    NRBC Automated 0.0 0.0 per 100 WBC    Immature Granulocytes 6 (H) 0 %    Seg Neutrophils 61 36 - 66 %    Lymphocytes 24 24 - 44 %    Monocytes 7 1 - 7 %    Eosinophils % 2 1 - 4 %    Basophils 0 0 - 2 %    Absolute Immature Granulocyte 0.92 (H) 0.00 - 0.30 k/uL    Segs Absolute 9.33 (H) 1.8 - 7.7 k/uL    Absolute Lymph # 3.67 1.0 - 4.8 k/uL    Absolute Mono # 1.07 (H) 0.1 - 0.8 k/uL    Absolute Eos # 0.31 0.0 - 0.4 k/uL    Basophils Absolute 0.00 0.0 - 0.2 k/uL    Morphology Normal    EKG 12 Lead   Result Value Ref Range    Ventricular Rate 60 BPM    Atrial Rate 60 BPM    P-R Interval 118 ms    QRS Duration 86 ms    Q-T Interval 406 ms    QTc Calculation (Bazett) 406 ms    P Axis 34 degrees    R Axis 64 degrees    T Axis 57 degrees   EKG 12 Lead   Result Value Ref Range    Ventricular Rate 105 BPM    Atrial Rate 105 BPM    P-R Interval 138 ms    QRS Duration 68 ms    Q-T Interval 330 ms    QTc Calculation (Bazett) 436 ms    P Axis 73 degrees    R Axis 68 degrees    T Axis 52 degrees     XR CHEST PORTABLE    Result Date: 3/15/2023  EXAMINATION: ONE X-RAY VIEW OF THE CHEST 3/15/2023 2:59 pm COMPARISON: September 7, 2014 HISTORY: ORDERING SYSTEM PROVIDED HISTORY:  Palpitations, shortness of breath. TECHNOLOGIST PROVIDED HISTORY: Palpitations, shortness of breath. Reason for Exam:  Port upright. FINDINGS: The cardiomediastinal silhouette is normal in size. The lungs are clear. No pleural effusion or pneumothorax is present. No acute cardiopulmonary process. Physical Exam:    General appearance - NAD, AOx 3   Lungs -CTAB, no R/R/R  Heart - RRR, no M/R/G  Abdomen - Soft, NT/ND  Neurological:  MAEx4, No focal motor deficit, sensory loss  Extremities - Cap refil <2 sec in all ext., no edema  Skin -warm, dry      Hospital Course:  Clinical course has improved, labs and imaging reviewed. Lazarus Riley originally presented to the hospital on 3/15/2023  2:36 PM with dizziness and urinary frequency, leukocytosis. At that time it was determined that She required further observation and cardiology evaluation and treatment of UTI. She underwent stress test that was negative, orthostatics negative. Leukocytosis improved. Will discharge with repeat CBC next week and follow up with PCP    Labs and imaging were followed daily. Imaging results as above. She is medically stable to be discharged. Disposition: Home    Patient stated that they will not drive themselves home from the hospital if they have gotten pain killers/ narcotics earlier that day and that they will arrange for transportation on their own or work with the  for a ride. Patient counseled NOT to drive while under the influence of narcotics/ pain killers. Condition: Good    Patient stable and ready for discharge home. I have discussed plan of care with patient and they are in understanding. They were instructed to read discharge paperwork. All of their questions and concerns were addressed.      Time Spent: 1 day      --  Fanny Harris DO  Emergency Medicine Resident Physician    This dictation was generated by voice recognition computer software. Although all attempts are made to edit the dictation for accuracy, there may be errors in the transcription that are not intended.

## 2023-03-17 NOTE — PROGRESS NOTES
Orthostatic Bps    Lying  106/69  p74  Sitting  108/73  p88  Standing  104/72  p88    LUCRETIA Chiu.

## 2023-03-17 NOTE — DISCHARGE INSTRUCTIONS
You were admitted for treatment of urinary tract infection. You received antibiotics and IV fluids. You were evaluated by cardiology and had a negative stress test. You had a borderline low blood pressure during admission and received IV fluids and had negative orthostatic measurements. Please finish course of antibiotics and follow up with repeat CBC as outpatient. If you notice any concerning symptoms please return to the ER immediately. These can include but are not limited to: fevers, chills, shortness of breath, vomiting, weakness of the extremities, changes in your mental status, numbness, pale extremities, or chest pain. Medications: Continue taking your home medications as previously directed. Please finish course of antibiotics     Follow up: Please follow up with your primary care doctor within one week or as needed.   Please obtain repeat CBC blood work prior to appointment

## 2023-03-17 NOTE — PROGRESS NOTES
OBS/CDU   RESIDENT NOTE      Patients PCP is:  Deanna Guerrier        SUBJECTIVE      No acute events overnight. Has been able to tolerate a full diet without nausea or vomiting. The patient is urinating on his own and is passing flatus. Denies fever, chills, nausea, vomiting, chest pain, shortness of breath, abdominal pain, focal weakness, numbness, tingling, urinary/bowel symptoms. Has remained afebrile since admission. Slightly hypotensive yesterday. On IVF. States she feels better and would like to go home today       PHYSICAL EXAM      General: NAD, AO X 3  Heent: normocephalic atraumatic   Cardiovascular: RRR, S1S2  Pulmonary: CTAB, NO SOB  Abdomen: SOFT, NTTP, ND  Extremities: NO SWELLING  Neuro / Psych: NO NUMBNESS OR TINGLING, MENTATION AT BASELINE    PERTINENT TEST /EXAMS      I have reviewed all available laboratory results. MEDICATIONS CURRENT   0.9 % sodium chloride infusion, Continuous  sodium chloride flush 0.9 % injection 5-40 mL, 2 times per day  sodium chloride flush 0.9 % injection 5-40 mL, PRN  0.9 % sodium chloride infusion, PRN  enoxaparin (LOVENOX) injection 40 mg, Daily  ondansetron (ZOFRAN-ODT) disintegrating tablet 4 mg, Q8H PRN   Or  ondansetron (ZOFRAN) injection 4 mg, Q6H PRN  polyethylene glycol (GLYCOLAX) packet 17 g, Daily PRN  acetaminophen (TYLENOL) tablet 650 mg, Q6H PRN   Or  acetaminophen (TYLENOL) suppository 650 mg, Q6H PRN  nitrofurantoin (macrocrystal-monohydrate) (MACROBID) capsule 100 mg, 2 times per day        All medication charted and reviewed. CONSULTS      IP CONSULT TO CARDIOLOGY    ASSESSMENT/PLAN       Dontrell Sutton is a 41 y.o. female who presents with     UTI  - Culture negative, blood cultures negative x2  - Nitrofurantoin 100 twice daily   - WBC 21.6 yesterday, decreased to 15 today.      Dizziness  - Negative ECG, Dimer, troponin; normal TSH   - Cardio consultation, stress test negative   - blood pressure has been borderline hypotensive since admission, will obtain orthostatics         Continue home medications and pain control  Monitor vitals, labs, and imaging  DISPO: pending consults and clinical improvement    --  Troy Leon DO  Emergency Medicine Resident Physician     This dictation was generated by voice recognition computer software. Although all attempts are made to edit the dictation for accuracy, there may be errors in the transcription that are not intended.

## 2023-03-17 NOTE — PROGRESS NOTES
[address]  CDU / OBSERVATION ENCOUNTER  ATTENDING NOTE       I performed a history and physical examination of the patient and discussed management with the resident or midlevel provider. I reviewed the resident or midlevel provider's note and agree with the documented findings and plan of care. Any areas of disagreement are noted on the chart. I was personally present for the key portions of any procedures. I have documented in the chart those procedures where I was not present during the key portions. I have reviewed the nurses notes. I agree with the chief complaint, past medical history, past surgical history, allergies, medications, social and family history as documented unless otherwise noted below. The Family history, social history, and ROS are effectively unchanged since admission unless noted elsewhere in the chart. This patient was placed in the observation unit for reevaluation for possible admission to the hospital    Patient with complaints of palpitations and chest discomfort in addition to urinary symptoms. Patient had significant UTI found with elevation of white count in the 20s. Due to leukocytosis patient was kept overnight last night after negative cardiac evaluation. He was concerning the patient's white count was still elevated and not resolving over the course of the day yesterday. Patient for reevaluation this morning with white count rechecked. Patient with white count improved. Discussed outpatient management.     Shereen Perry MD  Attending Emergency  Physician

## 2023-03-19 LAB
MICROORGANISM SPEC CULT: NORMAL
MICROORGANISM SPEC CULT: NORMAL
SERVICE CMNT-IMP: NORMAL
SERVICE CMNT-IMP: NORMAL
SPECIMEN DESCRIPTION: NORMAL
SPECIMEN DESCRIPTION: NORMAL

## 2023-03-20 ENCOUNTER — HOSPITAL ENCOUNTER (OUTPATIENT)
Age: 38
Discharge: HOME | End: 2023-03-20
Payer: MEDICAID

## 2023-03-20 LAB
ABSOLUTE EOS #: 0.15 K/UL (ref 0–0.4)
ABSOLUTE IMMATURE GRANULOCYTE: 0.74 K/UL (ref 0–0.3)
ABSOLUTE LYMPH #: 2.52 K/UL (ref 1–4.8)
ABSOLUTE MONO #: 1.04 K/UL (ref 0.1–0.8)
BASOPHILS # BLD: 0 % (ref 0–2)
BASOPHILS ABSOLUTE: 0 K/UL (ref 0–0.2)
EOSINOPHILS RELATIVE PERCENT: 1 % (ref 1–4)
HCT VFR BLD AUTO: 41.1 % (ref 36.3–47.1)
HGB BLD-MCNC: 13.8 G/DL (ref 11.9–15.1)
IMMATURE GRANULOCYTES: 5 %
LYMPHOCYTES # BLD: 17 % (ref 24–44)
MCH RBC QN AUTO: 31.7 PG (ref 25.2–33.5)
MCHC RBC AUTO-ENTMCNC: 33.6 G/DL (ref 28.4–34.8)
MCV RBC AUTO: 94.3 FL (ref 82.6–102.9)
MICROORGANISM SPEC CULT: NORMAL
MICROORGANISM SPEC CULT: NORMAL
MONOCYTES # BLD: 7 % (ref 1–7)
MORPHOLOGY: NORMAL
NRBC AUTOMATED: 0 PER 100 WBC
PDW BLD-RTO: 13.2 % (ref 11.8–14.4)
PLATELET # BLD AUTO: 306 K/UL (ref 138–453)
PMV BLD AUTO: 9.2 FL (ref 8.1–13.5)
RBC # BLD: 4.36 M/UL (ref 3.95–5.11)
SEG NEUTROPHILS: 70 % (ref 36–66)
SEGMENTED NEUTROPHILS ABSOLUTE COUNT: 10.35 K/UL (ref 1.8–7.7)
SERVICE CMNT-IMP: NORMAL
SERVICE CMNT-IMP: NORMAL
SPECIMEN DESCRIPTION: NORMAL
SPECIMEN DESCRIPTION: NORMAL
WBC # BLD AUTO: 14.8 K/UL (ref 3.5–11.3)

## 2023-03-20 PROCEDURE — 36415 COLL VENOUS BLD VENIPUNCTURE: CPT

## 2023-03-20 PROCEDURE — 85025 COMPLETE CBC W/AUTO DIFF WBC: CPT

## 2023-03-23 NOTE — PROCEDURES
[address]                  [address] [address] [facility] ([place], [place]), [facility] [address]                                 HOLTER MONITOR    PATIENT NAME: Ben Anderson                    :        1985  MED REC NO:   [id number]                             ROOM:       [id number]  ACCOUNT NO:   [de-identified]                           ADMIT DATE: 03/15/2023  PROVIDER:     Artem Romero    HOLTER MONITOR 48-HOURS    DATE OF STUDY:  2023    INDICATIONS: Palpitations    ORDERING PROVIDER: Dr. Cal Mcburney PROVIDER: Katelynn Garcia    INTERPRETING PHYSICIAN: Dr. Moralez Adele:    1. Normal sinus rhythm with Sinus bradycardia  and Sinus tachycardia  2. Average heart rate 88 bpm  3. Lucía 6 premature ventricular contractions  4.  No Arrhythmias         JUDI METZGER    D: 2023 14:05:18       T: 2023 14:06:51     [id number]  Job#: [id number]     Doc#: Unknown    CC:

## 2023-04-03 PROBLEM — M79.7 FIBROMYALGIA: Status: ACTIVE | Noted: 2022-11-21

## 2023-04-03 PROBLEM — M67.922 TENDINOPATHY OF LEFT BICEPS TENDON: Status: ACTIVE | Noted: 2022-11-21

## 2023-04-03 PROBLEM — R42 VERTIGO: Status: ACTIVE | Noted: 2022-11-21

## 2023-04-03 PROBLEM — G89.29 CHRONIC PAIN OF LEFT KNEE: Status: ACTIVE | Noted: 2022-11-21

## 2023-04-03 PROBLEM — M54.42 ACUTE LEFT-SIDED LOW BACK PAIN WITH LEFT-SIDED SCIATICA: Status: ACTIVE | Noted: 2023-01-09

## 2023-04-03 PROBLEM — Z79.631 METHOTREXATE, LONG TERM, CURRENT USE: Status: ACTIVE | Noted: 2022-10-18

## 2023-04-03 PROBLEM — M05.9 RHEUMATOID ARTHRITIS WITH RHEUMATOID FACTOR, UNSPECIFIED (HCC): Status: ACTIVE | Noted: 2022-10-18

## 2023-04-03 PROBLEM — M25.562 CHRONIC PAIN OF LEFT KNEE: Status: ACTIVE | Noted: 2022-11-21

## 2023-04-03 PROBLEM — R42 DIZZINESS AND GIDDINESS: Status: ACTIVE | Noted: 2022-11-21

## 2023-04-03 PROBLEM — I73.00 RAYNAUD'S DISEASE WITHOUT GANGRENE: Status: ACTIVE | Noted: 2022-10-18

## 2023-04-03 RX ORDER — CETIRIZINE HYDROCHLORIDE 10 MG/1
TABLET ORAL PRN
COMMUNITY
Start: 2023-01-09 | End: 2023-04-04

## 2023-04-03 RX ORDER — MEDROXYPROGESTERONE ACETATE 150 MG/ML
INJECTION, SUSPENSION INTRAMUSCULAR
COMMUNITY
Start: 2023-02-02 | End: 2023-04-04

## 2023-04-03 RX ORDER — TOFACITINIB 11 MG/1
TABLET, FILM COATED, EXTENDED RELEASE ORAL DAILY
COMMUNITY
Start: 2023-03-13

## 2023-04-03 RX ORDER — AMLODIPINE BESYLATE 5 MG/1
TABLET ORAL DAILY
COMMUNITY
Start: 2022-02-18

## 2023-04-03 RX ORDER — GABAPENTIN 300 MG/1
CAPSULE ORAL 3 TIMES DAILY
COMMUNITY
Start: 2022-11-21 | End: 2023-11-21

## 2023-04-04 ENCOUNTER — HOSPITAL ENCOUNTER (OUTPATIENT)
Age: 38
Setting detail: SPECIMEN
Discharge: HOME | End: 2023-04-04

## 2023-04-04 ENCOUNTER — OFFICE VISIT (OUTPATIENT)
Dept: OBGYN CLINIC | Age: 38
End: 2023-04-04
Payer: MEDICAID

## 2023-04-04 VITALS
HEART RATE: 103 BPM | DIASTOLIC BLOOD PRESSURE: 76 MMHG | HEIGHT: 64 IN | BODY MASS INDEX: 23.56 KG/M2 | WEIGHT: 138 LBS | SYSTOLIC BLOOD PRESSURE: 112 MMHG

## 2023-04-04 DIAGNOSIS — N20.0 KIDNEY STONE: ICD-10-CM

## 2023-04-04 DIAGNOSIS — N92.6 IRREGULAR MENSTRUAL CYCLE: ICD-10-CM

## 2023-04-04 DIAGNOSIS — Z01.419 ENCOUNTER FOR WELL WOMAN EXAM: Primary | ICD-10-CM

## 2023-04-04 PROCEDURE — 99385 PREV VISIT NEW AGE 18-39: CPT | Performed by: NURSE PRACTITIONER

## 2023-04-04 ASSESSMENT — ENCOUNTER SYMPTOMS
RESPIRATORY NEGATIVE: 1
GASTROINTESTINAL NEGATIVE: 1
BACK PAIN: 0
ALLERGIC/IMMUNOLOGIC NEGATIVE: 1
COUGH: 0
SHORTNESS OF BREATH: 0
ABDOMINAL DISTENTION: 0

## 2023-04-04 NOTE — ASSESSMENT & PLAN NOTE
Normal pelvic structures on recent CT. Normal thyroid function. Likely DUB, if pap is normal encouraged patient to consider options to control irregular bleeding.   We discussed both Mirena and ablation and I also offered her the option of ocps which she doesn't want to do

## 2023-04-04 NOTE — PROGRESS NOTES
[address] [facility]  [facility] Johan Owen  [address] [place] [address] [address] [facility]  Dept: [phone]      4/4/23    [address] [place] [address] Annual Exam      CHIEF COMPLAINT:    Chief Complaint   Patient presents with    Annual Exam     p 10-15-18 neg     Vaginal Bleeding     Pt states she is bleeding every other week, also happens after intercourse                 Blood pressure 112/76, pulse (!) 103, height 5' 4\" (1.626 m), weight 138 lb (62.6 kg), last menstrual period 03/27/2023, not currently breastfeeding. HPI :   Magdaleno Redman 1985 is a 41 y.o. [id number]  who is here for her annual exam. For the past few months she has been having irregular cycles and they have been coming every two weeks. She has been getting heavy flow alternating with spotting. She does have cramping. She has not had a pap since 2018.  Her thyroid was recently screened and was normal.  She had a CT in March and the pelvic organs were normal.  She does have a 2mm kidney stone which continues to give her pain    _____________________________________________________________________  Past Medical History:   Diagnosis Date    Cervical shortening     Hashimoto's disease     Hypoactive thyroid     Pap smear of cervix with ASCUS, cannot exclude HGSIL 03/09/2010    Rheumatoid arthritis ([facility] [address]                                                                    Past Surgical History:   Procedure Laterality Date    CERVICAL CERCLAGE  4-11-15    DILATION AND CURETTAGE      x 2    INSERTION OF CONTRACEPTIVE CAPSULE Left 10/01/2015    Nexplanon insertion     LEEP  06/21/2010    JUSTINA 2 and 3, HGSIL    OTHER SURGICAL HISTORY  10/19/15    skin bx pubic area, rt midline-pigmented seborrheic keratosis     Family History   Problem Relation Age of Onset    Thyroid Disease Mother     COPD Father     Emphysema Father     Other Father         double lung transplant    COPD Paternal Grandmother

## 2023-04-06 LAB
HPV SAMPLE: ABNORMAL
HPV, GENOTYPE 16: DETECTED
HPV, GENOTYPE 18: NOT DETECTED
HPV, HIGH RISK OTHER: NOT DETECTED
HPV, INTERPRETATION: ABNORMAL
SPECIMEN DESCRIPTION: ABNORMAL

## 2023-04-06 NOTE — RESULT ENCOUNTER NOTE
Her hpv came back positive. Pap is still pending.   Further management will be decided when those results are available

## 2023-04-14 PROBLEM — N39.0 UTI (URINARY TRACT INFECTION): Status: RESOLVED | Noted: 2023-03-15 | Resolved: 2023-04-14

## 2023-04-23 PROBLEM — Z79.631 LONG TERM (CURRENT) USE OF ANTIMETABOLITE AGENT: Status: ACTIVE | Noted: 2023-03-13

## 2023-04-27 ENCOUNTER — OFFICE VISIT (OUTPATIENT)
Dept: OBGYN CLINIC | Age: 38
End: 2023-04-27
Payer: MEDICAID

## 2023-04-27 VITALS
DIASTOLIC BLOOD PRESSURE: 78 MMHG | WEIGHT: 138 LBS | HEART RATE: 89 BPM | HEIGHT: 64 IN | SYSTOLIC BLOOD PRESSURE: 107 MMHG | BODY MASS INDEX: 23.56 KG/M2

## 2023-04-27 DIAGNOSIS — F41.8 ANXIETY ABOUT HEALTH: ICD-10-CM

## 2023-04-27 DIAGNOSIS — B97.7 HPV IN FEMALE: Primary | ICD-10-CM

## 2023-04-27 DIAGNOSIS — N92.6 IRREGULAR MENSTRUAL CYCLE: ICD-10-CM

## 2023-04-27 PROCEDURE — 99213 OFFICE O/P EST LOW 20 MIN: CPT | Performed by: NURSE PRACTITIONER

## 2023-04-27 ASSESSMENT — ENCOUNTER SYMPTOMS
BACK PAIN: 1
COUGH: 0
ABDOMINAL DISTENTION: 0
RESPIRATORY NEGATIVE: 1
ALLERGIC/IMMUNOLOGIC NEGATIVE: 1
SHORTNESS OF BREATH: 0

## 2023-04-27 NOTE — PROGRESS NOTES
The patient is being seen for a physical exam.   The patient was asked if they would like a chaperone in the room during the exam..  The patient has respectfully {Blank single:38414::\"declined\",\"accepted\"}
Dispense Refill    amLODIPine (NORVASC) 5 MG tablet Take by mouth daily      gabapentin (NEURONTIN) 300 MG capsule Take by mouth 3 times daily. Tofacitinib Citrate ER (XELJANZ XR) 11 MG TB24 Take by mouth daily      methotrexate (RHEUMATREX) 2.5 MG chemo tablet Take by mouth once a week 20 mg 1x a week. folic acid (FOLVITE) 1 MG tablet Take 2 mg by mouth daily      amLODIPine-atorvastatatin (CADUET) 5-10 MG per tablet Take 1 tablet by mouth daily       No current facility-administered medications for this visit. ALLERGIES:  Allergies as of 04/27/2023    (No Known Allergies)         REVIEW OF SYSTEMS:        Review of Systems   Constitutional: Negative. Negative for activity change, appetite change and fatigue. HENT: Negative. Respiratory: Negative. Negative for cough and shortness of breath. Cardiovascular: Negative. Gastrointestinal:  Negative for abdominal distention. Abdominal pain: sharp LLQ. Endocrine: Negative. Genitourinary:  Positive for menstrual problem. Musculoskeletal:  Positive for back pain. Negative for arthralgias. Skin: Negative. Allergic/Immunologic: Negative. Neurological:  Negative for dizziness and light-headedness. Physical Exam  Constitutional:       Appearance: Normal appearance. Pulmonary:      Effort: Pulmonary effort is normal.   Musculoskeletal:         General: Normal range of motion. Cervical back: Neck supple. Skin:     General: Skin is warm and dry. Neurological:      Mental Status: She is alert. Assessment/Plan   Diagnosis Orders   1. HPV in female        2. Irregular menstrual cycle        3. Anxiety about health          1. HPV in female  Assessment & Plan: Will repeat pap in one year  2. Irregular menstrual cycle  3. Anxiety about health       She has done nexplanon in the past and didn't like it. She refuses IUD and doesn't want to do a pill.   Will schedule consult with one of the physicians to discuss

## 2023-05-18 ENCOUNTER — INITIAL CONSULT (OUTPATIENT)
Dept: OBGYN CLINIC | Age: 38
End: 2023-05-18

## 2023-05-18 VITALS
HEART RATE: 81 BPM | DIASTOLIC BLOOD PRESSURE: 77 MMHG | BODY MASS INDEX: 22.02 KG/M2 | SYSTOLIC BLOOD PRESSURE: 111 MMHG | HEIGHT: 64 IN | WEIGHT: 129 LBS

## 2023-05-18 DIAGNOSIS — N93.9 ABNORMAL UTERINE BLEEDING (AUB): Primary | ICD-10-CM

## 2023-05-18 DIAGNOSIS — N93.0 PCB (POST COITAL BLEEDING): ICD-10-CM

## 2023-05-18 DIAGNOSIS — R87.619 ABNORMAL CERVICAL PAPANICOLAOU SMEAR, UNSPECIFIED ABNORMAL PAP FINDING: ICD-10-CM

## 2023-05-18 NOTE — PROGRESS NOTES
Fernando Davis  2023    YOB: 1985    HPI:  Zheng Yoder is a 41 y.o. female [id number]    The patient was seen and examined today. She is here regarding concern with her irregular cycles. Reports her periods have been heavy her entire life. More recently though, she will get 2 periods per month. They are painful. She was on birth control in the past with no improvement in symptoms. Also states she has LLQ pain that started in March. She had CT abdomen 23 that did not show provide any etiology for this pain. A non obstructing 2 mm left kidney stone was noted though. She is established with urology already. She states the pain is constant and is now slowly radiating to her right side. She feels like it is deep in nature and not superficial to the skin. She is very anxious about having RA. States that the medications she is on causes her to have a suppressed immune system so she is very worried about cancer. She desires a hysterctomy to resolve her heavy irregular periods and to remove her cervix as she has a hx of cervical dysplasia. She is s/p tubal ligation and does not want any more children. She is sexually active and discloses that she has a lot of post coital bleeding. This is new and started about 2 months ago. Said the blood will soak through her sheets.      Pap History   3/9/10 ASCH + HPV (did not specify but labeled it high risk)  6/21/10 LEEP CIN2-3  14 NILM  12/15/14 NILM  10/15/18  NILM  23 NILM with + HPV 16     OB History    Para Term  AB Living   4 2 2 0 2 2   SAB IAB Ectopic Molar Multiple Live Births   0 0 0 0 0 2      # Outcome Date GA Lbr Calvin/2nd Weight Sex Delivery Anes PTL Lv   4 Term 07/23/15 39w1d  6 lb 13.5 oz (3.104 kg) M  EPI  RADHA      Apgar1: 8  Apgar5: 9   3 Term 09 39w0d  8 lb 14 oz (4.026 kg) F Vag-Spont   RADHA      Name: Shaunna Parent   2 AB            1 AB                Past Medical History:   Diagnosis Date    Cervical shortening

## 2023-06-08 ENCOUNTER — HOSPITAL ENCOUNTER (EMERGENCY)
Age: 38
Discharge: HOME | End: 2023-06-08
Attending: EMERGENCY MEDICINE
Payer: MEDICAID

## 2023-06-08 ENCOUNTER — APPOINTMENT (OUTPATIENT)
Dept: ULTRASOUND IMAGING | Age: 38
End: 2023-06-08
Payer: MEDICAID

## 2023-06-08 VITALS
RESPIRATION RATE: 18 BRPM | TEMPERATURE: 99 F | SYSTOLIC BLOOD PRESSURE: 117 MMHG | OXYGEN SATURATION: 99 % | DIASTOLIC BLOOD PRESSURE: 89 MMHG | HEART RATE: 79 BPM | WEIGHT: 140 LBS | BODY MASS INDEX: 24.03 KG/M2

## 2023-06-08 DIAGNOSIS — R11.2 NAUSEA AND VOMITING, UNSPECIFIED VOMITING TYPE: Primary | ICD-10-CM

## 2023-06-08 DIAGNOSIS — N94.89 ADNEXAL MASS: ICD-10-CM

## 2023-06-08 DIAGNOSIS — R19.7 DIARRHEA, UNSPECIFIED TYPE: ICD-10-CM

## 2023-06-08 DIAGNOSIS — N83.201 CYST OF RIGHT OVARY: ICD-10-CM

## 2023-06-08 DIAGNOSIS — N30.00 ACUTE CYSTITIS WITHOUT HEMATURIA: ICD-10-CM

## 2023-06-08 LAB
ALBUMIN SERPL-MCNC: 4.4 G/DL (ref 3.5–5.2)
ALBUMIN/GLOB SERPL: 1.3 {RATIO} (ref 1–2.5)
ALP SERPL-CCNC: 83 U/L (ref 35–104)
ALT SERPL-CCNC: 18 U/L (ref 5–33)
ANION GAP SERPL CALCULATED.3IONS-SCNC: 12 MMOL/L (ref 9–17)
AST SERPL-CCNC: 23 U/L
BASOPHILS # BLD: 0.07 K/UL (ref 0–0.2)
BASOPHILS NFR BLD: 1 % (ref 0–2)
BILIRUB SERPL-MCNC: 0.3 MG/DL (ref 0.3–1.2)
BILIRUB UR QL STRIP: NEGATIVE
BUN SERPL-MCNC: 11 MG/DL (ref 6–20)
CALCIUM SERPL-MCNC: 9.8 MG/DL (ref 8.6–10.4)
CANCER AG125 SERPL-ACNC: 10 U/ML
CANDIDA SPECIES, DNA PROBE: NEGATIVE
CASTS #/AREA URNS LPF: ABNORMAL /LPF (ref 0–8)
CHLORIDE SERPL-SCNC: 98 MMOL/L (ref 98–107)
CLARITY UR: CLEAR
CO2 SERPL-SCNC: 22 MMOL/L (ref 20–31)
COLOR UR: YELLOW
CREAT SERPL-MCNC: 0.62 MG/DL (ref 0.5–0.9)
EOSINOPHIL # BLD: 0.06 K/UL (ref 0–0.44)
EOSINOPHILS RELATIVE PERCENT: 1 % (ref 1–4)
EPI CELLS #/AREA URNS HPF: ABNORMAL /HPF (ref 0–5)
ERYTHROCYTE [DISTWIDTH] IN BLOOD BY AUTOMATED COUNT: 13 % (ref 11.8–14.4)
GARDNERELLA VAGINALIS, DNA PROBE: NEGATIVE
GFR SERPL CREATININE-BSD FRML MDRD: >60 ML/MIN/1.73M2
GLUCOSE SERPL-MCNC: 93 MG/DL (ref 70–99)
GLUCOSE UR STRIP-MCNC: NEGATIVE MG/DL
HCG SERPL QL: NEGATIVE
HCT VFR BLD AUTO: 41.9 % (ref 36.3–47.1)
HGB BLD-MCNC: 14.2 G/DL (ref 11.9–15.1)
HGB UR QL STRIP.AUTO: NEGATIVE
IMM GRANULOCYTES # BLD AUTO: 0.08 K/UL (ref 0–0.3)
IMM GRANULOCYTES NFR BLD: 1 %
KETONES UR STRIP-MCNC: NEGATIVE MG/DL
LEUKOCYTE ESTERASE UR QL STRIP: ABNORMAL
LYMPHOCYTES # BLD: 25 % (ref 24–43)
LYMPHOCYTES NFR BLD: 2.18 K/UL (ref 1.1–3.7)
MCH RBC QN AUTO: 31.3 PG (ref 25.2–33.5)
MCHC RBC AUTO-ENTMCNC: 33.9 G/DL (ref 28.4–34.8)
MCV RBC AUTO: 92.3 FL (ref 82.6–102.9)
MONOCYTES NFR BLD: 0.86 K/UL (ref 0.1–1.2)
MONOCYTES NFR BLD: 10 % (ref 3–12)
NEUTROPHILS NFR BLD: 62 % (ref 36–65)
NEUTS SEG NFR BLD: 5.49 K/UL (ref 1.5–8.1)
NITRITE UR QL STRIP: NEGATIVE
NRBC AUTOMATED: 0 PER 100 WBC
PH UR STRIP: 7.5 [PH] (ref 5–8)
PLATELET # BLD AUTO: 228 K/UL (ref 138–453)
PMV BLD AUTO: 11.2 FL (ref 8.1–13.5)
POTASSIUM SERPL-SCNC: 4.3 MMOL/L (ref 3.7–5.3)
PROT SERPL-MCNC: 7.7 G/DL (ref 6.4–8.3)
PROT UR STRIP-MCNC: NEGATIVE MG/DL
RBC # BLD AUTO: 4.54 M/UL (ref 3.95–5.11)
RBC #/AREA URNS HPF: ABNORMAL /HPF (ref 0–4)
SODIUM SERPL-SCNC: 132 MMOL/L (ref 135–144)
SOURCE: NORMAL
SP GR UR STRIP: 1.01 (ref 1–1.03)
TRICHOMONAS VAGINALIS DNA: NEGATIVE
UROBILINOGEN UR STRIP-ACNC: NORMAL
WBC #/AREA URNS HPF: ABNORMAL /HPF (ref 0–5)
WBC OTHER # BLD: 8.7 K/UL (ref 3.5–11.3)

## 2023-06-08 PROCEDURE — 76830 TRANSVAGINAL US NON-OB: CPT

## 2023-06-08 PROCEDURE — 86304 IMMUNOASSAY TUMOR CA 125: CPT

## 2023-06-08 PROCEDURE — 87660 TRICHOMONAS VAGIN DIR PROBE: CPT

## 2023-06-08 PROCEDURE — 93005 ELECTROCARDIOGRAM TRACING: CPT | Performed by: EMERGENCY MEDICINE

## 2023-06-08 PROCEDURE — 2580000003 HC RX 258: Performed by: EMERGENCY MEDICINE

## 2023-06-08 PROCEDURE — 93976 VASCULAR STUDY: CPT

## 2023-06-08 PROCEDURE — 81001 URINALYSIS AUTO W/SCOPE: CPT

## 2023-06-08 PROCEDURE — 87510 GARDNER VAG DNA DIR PROBE: CPT

## 2023-06-08 PROCEDURE — 80053 COMPREHEN METABOLIC PANEL: CPT

## 2023-06-08 PROCEDURE — 85027 COMPLETE CBC AUTOMATED: CPT

## 2023-06-08 PROCEDURE — 87480 CANDIDA DNA DIR PROBE: CPT

## 2023-06-08 PROCEDURE — 84703 CHORIONIC GONADOTROPIN ASSAY: CPT

## 2023-06-08 PROCEDURE — 87491 CHLMYD TRACH DNA AMP PROBE: CPT

## 2023-06-08 PROCEDURE — 76856 US EXAM PELVIC COMPLETE: CPT

## 2023-06-08 PROCEDURE — 87591 N.GONORRHOEAE DNA AMP PROB: CPT

## 2023-06-08 RX ORDER — SODIUM CHLORIDE, SODIUM LACTATE, POTASSIUM CHLORIDE, AND CALCIUM CHLORIDE .6; .31; .03; .02 G/100ML; G/100ML; G/100ML; G/100ML
1000 INJECTION, SOLUTION INTRAVENOUS ONCE
Status: COMPLETED | OUTPATIENT
Start: 2023-06-08 | End: 2023-06-08

## 2023-06-08 RX ORDER — CEPHALEXIN 500 MG/1
500 CAPSULE ORAL 2 TIMES DAILY
Qty: 14 CAPSULE | Refills: 0 | Status: SHIPPED | OUTPATIENT
Start: 2023-06-08 | End: 2023-06-15

## 2023-06-08 RX ORDER — ONDANSETRON 4 MG/1
4 TABLET, ORALLY DISINTEGRATING ORAL 3 TIMES DAILY PRN
Qty: 9 TABLET | Refills: 0 | Status: SHIPPED | OUTPATIENT
Start: 2023-06-08 | End: 2023-06-11

## 2023-06-08 RX ADMIN — SODIUM CHLORIDE, POTASSIUM CHLORIDE, SODIUM LACTATE AND CALCIUM CHLORIDE 1000 ML: 600; 310; 30; 20 INJECTION, SOLUTION INTRAVENOUS at 11:17

## 2023-06-08 ASSESSMENT — PAIN SCALES - GENERAL: PAINLEVEL_OUTOF10: 5

## 2023-06-08 ASSESSMENT — ENCOUNTER SYMPTOMS
DIARRHEA: 1
SORE THROAT: 0
NAUSEA: 1
ABDOMINAL PAIN: 1
VOMITING: 1

## 2023-06-08 ASSESSMENT — PAIN - FUNCTIONAL ASSESSMENT: PAIN_FUNCTIONAL_ASSESSMENT: 0-10

## 2023-06-08 NOTE — ED NOTES
The following labs were labeled with appropriate pt sticker and tubed to lab:     [] Blue     [] Lavender   [] on ice  [] Green/yellow  [] Green/black [] on ice  [] Comanche Brittney  [] on ice  [] Yellow  [] Red  [] Type/ Screen  [] ABG  [] VBG    [] COVID-19 swab    [] Rapid  [] PCR  [] Flu swab  [] Peds Viral Panel     [] Urine Sample  [] Fecal Sample  [x] Pelvic Cultures  [] Blood Cultures  [] X 2  [] STREP Cultures       Lilliam Lisa RN  06/08/23 3370

## 2023-06-08 NOTE — CONSULTS
today in ER versus biopsy at time of colposcopy in office Monday 6/12/23 and she desires to wait until Monday   - Recommend nothing in vagina until her appointment, given bleeding precautions   - Patient overall nontoxic appearing   - Again, low suspicion that GYN findings are the cause of her acute symptoms for 1 week. OBGYN to sign off and recommend very close outpatient follow up for further workup of cervical mass and right adnexal mass   - Ok for discharge from [facility] unless any further workup indicated for acute symptoms   - Discussed plan of care with Dr. Slim Ling who is agreeable      Patient Active Problem List    Diagnosis Date Noted    S/P BMZ 4/11, 4/12 04/11/2015     Priority: High    Leukocytosis 03/16/2023     Priority: Medium    HPV in female 04/27/2023    Irregular menstrual cycle 04/04/2023    Long term (current) use of antimetabolite agent 03/13/2023    Acute left-sided low back pain with left-sided sciatica 01/09/2023    Chronic pain of left knee 11/21/2022    Fibromyalgia 11/21/2022    Tendinopathy of left biceps tendon 11/21/2022    Dizziness and giddiness 11/21/2022    Vertigo 11/21/2022    Methotrexate, long term, current use 10/18/2022    Raynaud's disease without gangrene 10/18/2022    Rheumatoid arthritis with rheumatoid factor, unspecified ([facility] [address] 10/18/2022    Right carpal tunnel syndrome     Acute nonintractable headache 12/12/2017    Hypothyroid 02/11/2015     Synthroid 50 mcg/day    Labs have been normal throughout pregnancy. With last one 6/8/15. H/O LEEP (2009) 02/11/2015       Plan discussed with Dr. Slim Ling, who is agreeable. Ila Dueñas DO  Ob/Gyn Resident   [address]  6/8/2023, 5:40 PM    Attending Physician Statement  I have personally seen, evaluated and discussed the care of [facility], including pertinent history and exam findings with the resident. I have reviewed and edited their note in the electronic medical record.  The

## 2023-06-08 NOTE — DISCHARGE INSTRUCTIONS
You were seen in the ER for 1 week of nausea, vomiting, diarrhea. Your labs were normal. Your urine studies were normal. We did a pelvic US due to your left lower abdominal pain. This showed a complex cyst with septations. You were evaluated by gynecology. They recommend an MRI and further work up in their clinic. If you notice any concerning symptoms please return to the ER immediately. These can include but are not limited to: fevers, chills, shortness of breath, vomiting, weakness of the extremities, changes in your mental status, numbness, pale extremities, or chest pain. Medications: Continue taking your home medications as previously directed. For pain You may take tylenol 1,000mg by mouth every 6 hours as needed for pain. Do not exceed 4,000mg per day. If you have liver disease don't take tylenol. You may also take ibuprofen 600mg every 6-8 hours as needed for pain. Do not exceed 2,400 mg per day. If you experience stomach pain or you have a history of kidney disease stop taking ibuprofen. You may alternate application of ice and heat 20 minutes at a time as desired. Follow up: Please follow up with your primary care doctor within one week or as needed.

## 2023-06-08 NOTE — ED NOTES
Report given to [facility], all questions asked and answered.       Asmita Giraldo RN  06/08/23 7845

## 2023-06-08 NOTE — ED NOTES
Patient presents to the ED with c/o N/V/D, and abd pain. Patient reports the N/V/D started three days ago and reports having increased bowel movements and frequent urination. Patient also notes LLQ abd pain/pelvic pain that has been ongoing for several months. Patient reports she had a CT scan in April due to having a hx of kidney stones. Patient reports the CT indicated no movement in kidney stone. Patient is alert and oriented x4, answering questions appropriately. Patient is changed into a gown, placed on cardiac monitor, EKG done, IV established, will continue to monitor.         Jose De Jesus Cabello RN  06/08/23 0827

## 2023-06-08 NOTE — ED PROVIDER NOTES
[facility]     Emergency Department     Faculty Attestation    I performed a history and physical examination of the patient and discussed management with the resident. I reviewed the resident´s note and agree with the documented findings and plan of care. Any areas of disagreement are noted on the chart. I was personally present for the key portions of any procedures. I have documented in the chart those procedures where I was not present during the key portions. I have reviewed the emergency nurses triage note. I agree with the chief complaint, past medical history, past surgical history, allergies, medications, social and family history as documented unless otherwise noted below. For Physician Assistant/ Nurse Practitioner cases/documentation I have personally evaluated this patient and have completed at least one if not all key elements of the E/M (history, physical exam, and MDM). Additional findings are as noted. Left lower quadrant and left adnexal pain, going on for 2 months, patient has had several negative CAT scans for this condition. Patient has not had a pelvic ultrasound done. Yeimi Blanc MD  06/08/23 7310    The patient's pain has been going on for 2 months, she has seen her OB GYN doctor about  the pain and has had a pelvic exam done. She is also scheduled to see her OB/GYN doctor again next week.        Yeimi Blanc MD  06/08/23 9986
provided below. 2. Normal appearance of the left ovary. Normal arterial and venous Doppler   flow in association with the ovaries. 3. Anteverted uterus. Nabothian cysts. 4. Endometrial stripe thickness measuring 8 mm, within normal limits. RECOMMENDATIONS:   3.3 cm indeterminate complex cystic lesion with thick septations measuring up   to 3 mm. Contrast-enhanced pelvic MRI is recommended. Recommend gynecological surgical consultation. Note: This recommendation does not apply to premenarchal patients and to   those with increased risk (genetic, family history, elevated tumor markers or   other high-risk factors) of ovarian cancer. Reference: Radiology 2010   Sep;256(3):943-54 with full reference         [address]   Final Result   1. Indeterminate complex cystic lesion measuring up to 3.3 cm with thick   septations. No internal color flow is evident, however. Mildly complex   cystic lesion with low level internal echoes without central color flow   measuring 1.9 cm. Small amount of free fluid in the cul-de-sac. Septations   measure up to 3 mm in thickness. Please see recommendations provided below. 2. Normal appearance of the left ovary. Normal arterial and venous Doppler   flow in association with the ovaries. 3. Anteverted uterus. Nabothian cysts. 4. Endometrial stripe thickness measuring 8 mm, within normal limits. RECOMMENDATIONS:   3.3 cm indeterminate complex cystic lesion with thick septations measuring up   to 3 mm. Contrast-enhanced pelvic MRI is recommended. Recommend gynecological surgical consultation. Note: This recommendation does not apply to premenarchal patients and to   those with increased risk (genetic, family history, elevated tumor markers or   other high-risk factors) of ovarian cancer. Reference: Radiology 2010   Sep;256(3):943-54 with full reference         US DUP ABD PEL RETRO SCROT LIMITED   Final Result   1.
[SK]   7901 Reevaluated patient and still refusing medications. Awaiting pelvic US [SK]   4015 Updated patient on results, will have GYN evaluate  [SK]   1600 OBGYN will come to evaluate patient  [SK]   [phone] Updated patient on results. Discharged with zofran for nausea. Discussed follow up and return precautions with patient and they vocalized understanding. OBGYN OK with follow up on Monday for lab results and vaginitis. D[facility]ed that [facility].    [SK]      ED Course User Index  [SK] Mateusz Chandra DO       PROCEDURES:      CONSULTS:  IP CONSULT TO OB GYN    CRITICAL CARE:  There was significant risk of life threatening deterioration of patient's condition requiring my direct management. Critical care time 0 minutes, excluding any documented procedures. FINAL IMPRESSION      1. Nausea and vomiting, unspecified vomiting type    2. Diarrhea, unspecified type    3. Cyst of right ovary    4. Adnexal mass    5.  Acute cystitis without hematuria          DISPOSITION / PLAN     DISPOSITION Decision To Discharge 06/08/2023 04:58:30 PM      PATIENT REFERRED TO:  Oral Has Dr Singh [facility]  [phone]      If symptoms worsen    [address] [facility]  [address] [id number] [phone]          DISCHARGE MEDICATIONS:  New Prescriptions    CEPHALEXIN (KEFLEX) 500 MG CAPSULE    Take 1 capsule by mouth 2 times daily for 7 days    ONDANSETRON (ZOFRAN-ODT) 4 MG DISINTEGRATING TABLET    Take 1 tablet by mouth 3 times daily as needed for Nausea or Vomiting       Mateusz Chandra DO  Emergency Medicine Resident    (Please note that portions of thisnote were completed with a voice recognition program.  Efforts were made to edit the dictations but occasionally words are mis-transcribed.)        Mateusz Chandra DO  Resident  06/08/23 7037

## 2023-06-09 ENCOUNTER — TELEPHONE (OUTPATIENT)
Dept: OBGYN | Age: 38
End: 2023-06-09

## 2023-06-09 DIAGNOSIS — A74.9 CHLAMYDIA: Primary | ICD-10-CM

## 2023-06-09 LAB
C TRACH DNA SPEC QL PROBE+SIG AMP: ABNORMAL
EKG ATRIAL RATE: 67 BPM
EKG P AXIS: 62 DEGREES
EKG P-R INTERVAL: 146 MS
EKG Q-T INTERVAL: 374 MS
EKG QRS DURATION: 68 MS
EKG QTC CALCULATION (BAZETT): 395 MS
EKG R AXIS: 70 DEGREES
EKG T AXIS: 65 DEGREES
EKG VENTRICULAR RATE: 67 BPM
N GONORRHOEA DNA SPEC QL PROBE+SIG AMP: NEGATIVE
SPECIMEN DESCRIPTION: ABNORMAL

## 2023-06-09 RX ORDER — DOXYCYCLINE HYCLATE 100 MG
100 TABLET ORAL 2 TIMES DAILY
Qty: 14 TABLET | Refills: 0 | Status: SHIPPED | OUTPATIENT
Start: 2023-06-09 | End: 2023-06-16

## 2023-06-09 NOTE — TELEPHONE ENCOUNTER
Obstetric/Gynecology Resident Interval Note    Patient called regarding positive chlamydia test in ER yesterday. Rx for doxycycline sent to pharmacy. Encourage her to initiate treatment over the weekend and keep appointment with Dr. Ari Schmidt on Monday 6/12/23 for colposcopy and cervical biopsy if indicated. Patient agreeable to plan, all questions answered.     Myrna Neves DO  OB/GYN Resident, [address] [place] [address]  6/9/2023, 1:00 PM

## 2023-06-15 PROBLEM — C53.9 ADENOCARCINOMA OF CERVIX (HCC): Status: ACTIVE | Noted: 2023-06-15

## 2023-06-19 ENCOUNTER — OFFICE VISIT (OUTPATIENT)
Dept: GYNECOLOGIC ONCOLOGY | Age: 38
End: 2023-06-19

## 2023-06-19 VITALS
OXYGEN SATURATION: 99 % | BODY MASS INDEX: 23.74 KG/M2 | SYSTOLIC BLOOD PRESSURE: 139 MMHG | HEART RATE: 109 BPM | WEIGHT: 134 LBS | DIASTOLIC BLOOD PRESSURE: 99 MMHG | TEMPERATURE: 98.8 F | HEIGHT: 63 IN

## 2023-06-19 DIAGNOSIS — F41.8 SITUATIONAL ANXIETY: ICD-10-CM

## 2023-06-19 DIAGNOSIS — C53.9 ADENOCARCINOMA OF CERVIX (HCC): Primary | ICD-10-CM

## 2023-06-19 DIAGNOSIS — C53.8 MALIGNANT NEOPLASM OF OVERLAPPING SITES OF CERVIX UTERI (HCC): ICD-10-CM

## 2023-06-19 DIAGNOSIS — K59.00 CONSTIPATION, UNSPECIFIED CONSTIPATION TYPE: ICD-10-CM

## 2023-06-19 RX ORDER — SENNA PLUS 8.6 MG/1
1 TABLET ORAL 2 TIMES DAILY
Qty: 60 TABLET | Refills: 11 | Status: SHIPPED | OUTPATIENT
Start: 2023-06-19 | End: 2023-06-19

## 2023-06-19 RX ORDER — SENNA PLUS 8.6 MG/1
1 TABLET ORAL 2 TIMES DAILY
Qty: 60 TABLET | Refills: 11 | Status: SHIPPED | OUTPATIENT
Start: 2023-06-19 | End: 2024-06-18

## 2023-06-19 RX ORDER — DULOXETIN HYDROCHLORIDE 30 MG/1
30 CAPSULE, DELAYED RELEASE ORAL DAILY
Qty: 30 CAPSULE | Refills: 11 | Status: SHIPPED | OUTPATIENT
Start: 2023-06-19

## 2023-06-19 RX ORDER — LORAZEPAM 0.5 MG/1
0.5 TABLET ORAL 3 TIMES DAILY PRN
Qty: 25 TABLET | Refills: 0 | Status: SHIPPED | OUTPATIENT
Start: 2023-06-19 | End: 2023-07-19

## 2023-06-19 ASSESSMENT — ENCOUNTER SYMPTOMS
EYES NEGATIVE: 1
BACK PAIN: 1
RESPIRATORY NEGATIVE: 1
ABDOMINAL PAIN: 1

## 2023-06-20 ENCOUNTER — HOSPITAL ENCOUNTER (OUTPATIENT)
Dept: NUCLEAR MEDICINE | Age: 38
Discharge: HOME | End: 2023-06-22
Attending: OBSTETRICS & GYNECOLOGY
Payer: MEDICAID

## 2023-06-20 DIAGNOSIS — C53.8 MALIGNANT NEOPLASM OF OVERLAPPING SITES OF CERVIX UTERI (HCC): ICD-10-CM

## 2023-06-20 DIAGNOSIS — C53.9 ADENOCARCINOMA OF CERVIX (HCC): ICD-10-CM

## 2023-06-20 LAB — GLUCOSE BLD-MCNC: 100 MG/DL (ref 65–105)

## 2023-06-20 PROCEDURE — 82947 ASSAY GLUCOSE BLOOD QUANT: CPT

## 2023-06-20 PROCEDURE — A9552 F18 FDG: HCPCS | Performed by: OBSTETRICS & GYNECOLOGY

## 2023-06-20 PROCEDURE — 78815 PET IMAGE W/CT SKULL-THIGH: CPT

## 2023-06-20 PROCEDURE — 3430000000 HC RX DIAGNOSTIC RADIOPHARMACEUTICAL: Performed by: OBSTETRICS & GYNECOLOGY

## 2023-06-20 PROCEDURE — 2580000003 HC RX 258: Performed by: OBSTETRICS & GYNECOLOGY

## 2023-06-20 RX ORDER — SODIUM CHLORIDE 0.9 % (FLUSH) 0.9 %
10 SYRINGE (ML) INJECTION ONCE
Status: COMPLETED | OUTPATIENT
Start: 2023-06-20 | End: 2023-06-20

## 2023-06-20 RX ORDER — FLUDEOXYGLUCOSE F 18 200 MCI/ML
10 INJECTION, SOLUTION INTRAVENOUS
Status: COMPLETED | OUTPATIENT
Start: 2023-06-20 | End: 2023-06-20

## 2023-06-20 RX ADMIN — SODIUM CHLORIDE, PRESERVATIVE FREE 10 ML: 5 INJECTION INTRAVENOUS at 12:57

## 2023-06-20 RX ADMIN — FLUDEOXYGLUCOSE F 18 10.2 MILLICURIE: 200 INJECTION, SOLUTION INTRAVENOUS at 12:57

## 2023-06-23 ENCOUNTER — TELEMEDICINE (OUTPATIENT)
Dept: GYNECOLOGIC ONCOLOGY | Age: 38
End: 2023-06-23

## 2023-06-23 ENCOUNTER — TELEPHONE (OUTPATIENT)
Dept: ONCOLOGY | Age: 38
End: 2023-06-23

## 2023-06-23 ENCOUNTER — PATIENT MESSAGE (OUTPATIENT)
Dept: GYNECOLOGIC ONCOLOGY | Age: 38
End: 2023-06-23

## 2023-06-23 DIAGNOSIS — C53.9 ADENOCARCINOMA OF CERVIX (HCC): Primary | ICD-10-CM

## 2023-06-23 DIAGNOSIS — C53.8 MALIGNANT NEOPLASM OF OVERLAPPING SITES OF CERVIX UTERI (HCC): ICD-10-CM

## 2023-06-23 DIAGNOSIS — F41.8 SITUATIONAL ANXIETY: ICD-10-CM

## 2023-06-23 DIAGNOSIS — K59.00 CONSTIPATION, UNSPECIFIED CONSTIPATION TYPE: ICD-10-CM

## 2023-06-23 NOTE — TELEPHONE ENCOUNTER
Oncology navigation referral from Dr. Enedina Lea received. Name: Mar Singh  : 1985  MRN: [id number]    Oncology Navigation- Initial Note:    Intake-  Contact Type: Telephone    Continuum of Care: Diagnosis/Active Treatment    Notes: Introductory phone call made to patient, instructed patient writer will be navigating oncology care & may call writer with any questions/concerns @ [phone] prn. Discussed potential barriers to care, pt denied any. Inquired on smoking/alcohol/drug use. Pt stated former smoker, quit 8 months ago. Pt stated occasional alcohol use & denied drug use. Discussed community resources w/pt, offered referrals to [facility], pt agreeable to both. [address] [place] [address] FAA completed. Pt confirmed  Dr. Kathie Fleischer consult. Notified pt writer will follow RO consult & IR referral closely. Pt denied questions/concerns. Encouraged pt to contact writer prn. Secure e-mail sent to [facility], notified of referral.  Attempted to contact [employer], [address] [place] [address], no answer, VM left, notified of referral.  [address] written materials, [address] [place] [address] brochure, [facility] postcard, & writer's business card mailed to patient. Will continue to follow.      Electronically signed by Hernan Ram RN on 2023 at 3:50 PM

## 2023-06-26 ENCOUNTER — TELEPHONE (OUTPATIENT)
Dept: INTERVENTIONAL RADIOLOGY/VASCULAR | Age: 38
End: 2023-06-26

## 2023-06-26 ENCOUNTER — TELEPHONE (OUTPATIENT)
Dept: GYNECOLOGIC ONCOLOGY | Age: 38
End: 2023-06-26

## 2023-06-27 ENCOUNTER — TELEPHONE (OUTPATIENT)
Dept: ONCOLOGY | Age: 38
End: 2023-06-27

## 2023-06-27 ENCOUNTER — INITIAL CONSULT (OUTPATIENT)
Dept: ONCOLOGY | Age: 38
End: 2023-06-27
Payer: MEDICAID

## 2023-06-27 VITALS
SYSTOLIC BLOOD PRESSURE: 118 MMHG | BODY MASS INDEX: 24.1 KG/M2 | HEIGHT: 63 IN | RESPIRATION RATE: 16 BRPM | TEMPERATURE: 97.1 F | WEIGHT: 136 LBS | HEART RATE: 64 BPM | DIASTOLIC BLOOD PRESSURE: 74 MMHG

## 2023-06-27 DIAGNOSIS — C53.9 ADENOCARCINOMA OF CERVIX (HCC): Primary | ICD-10-CM

## 2023-06-27 PROCEDURE — 99202 OFFICE O/P NEW SF 15 MIN: CPT | Performed by: INTERNAL MEDICINE

## 2023-06-27 PROCEDURE — 99245 OFF/OP CONSLTJ NEW/EST HI 55: CPT | Performed by: INTERNAL MEDICINE

## 2023-06-27 RX ORDER — AMITRIPTYLINE HYDROCHLORIDE 25 MG/1
25 TABLET, FILM COATED ORAL NIGHTLY
COMMUNITY
Start: 2023-06-13 | End: 2023-06-27 | Stop reason: ALTCHOICE

## 2023-06-27 RX ORDER — LEUCOVORIN CALCIUM 5 MG/1
TABLET ORAL
COMMUNITY
Start: 2023-06-12

## 2023-06-29 ENCOUNTER — HOSPITAL ENCOUNTER (OUTPATIENT)
Dept: MRI IMAGING | Age: 38
Discharge: HOME | End: 2023-07-01
Attending: OBSTETRICS & GYNECOLOGY
Payer: MEDICAID

## 2023-06-29 ENCOUNTER — TELEPHONE (OUTPATIENT)
Dept: ONCOLOGY | Age: 38
End: 2023-06-29

## 2023-06-29 DIAGNOSIS — C53.8 MALIGNANT NEOPLASM OF OVERLAPPING SITES OF CERVIX UTERI (HCC): ICD-10-CM

## 2023-06-29 DIAGNOSIS — C53.9 ADENOCARCINOMA OF CERVIX (HCC): ICD-10-CM

## 2023-06-29 PROCEDURE — 6360000004 HC RX CONTRAST MEDICATION: Performed by: OBSTETRICS & GYNECOLOGY

## 2023-06-29 PROCEDURE — A9579 GAD-BASE MR CONTRAST NOS,1ML: HCPCS | Performed by: OBSTETRICS & GYNECOLOGY

## 2023-06-29 PROCEDURE — 2580000003 HC RX 258: Performed by: OBSTETRICS & GYNECOLOGY

## 2023-06-29 PROCEDURE — 72197 MRI PELVIS W/O & W/DYE: CPT

## 2023-06-29 RX ORDER — SODIUM CHLORIDE 0.9 % (FLUSH) 0.9 %
10 SYRINGE (ML) INJECTION PRN
Status: DISCONTINUED | OUTPATIENT
Start: 2023-06-29 | End: 2023-07-02 | Stop reason: HOSPADM

## 2023-06-29 RX ORDER — 0.9 % SODIUM CHLORIDE 0.9 %
20 INTRAVENOUS SOLUTION INTRAVENOUS ONCE
Status: COMPLETED | OUTPATIENT
Start: 2023-06-29 | End: 2023-06-29

## 2023-06-29 RX ADMIN — SODIUM CHLORIDE, PRESERVATIVE FREE 10 ML: 5 INJECTION INTRAVENOUS at 08:43

## 2023-06-29 RX ADMIN — GADOTERIDOL 10 ML: 279.3 INJECTION, SOLUTION INTRAVENOUS at 08:42

## 2023-06-29 RX ADMIN — SODIUM CHLORIDE 20 ML: 9 INJECTION, SOLUTION INTRAVENOUS at 08:43

## 2023-06-30 ENCOUNTER — TELEPHONE (OUTPATIENT)
Dept: ONCOLOGY | Age: 38
End: 2023-06-30

## 2023-06-30 ENCOUNTER — HOSPITAL ENCOUNTER (OUTPATIENT)
Dept: RADIATION ONCOLOGY | Age: 38
Discharge: HOME | End: 2023-06-30
Payer: MEDICAID

## 2023-06-30 VITALS
RESPIRATION RATE: 14 BRPM | HEART RATE: 82 BPM | BODY MASS INDEX: 23.6 KG/M2 | SYSTOLIC BLOOD PRESSURE: 118 MMHG | DIASTOLIC BLOOD PRESSURE: 82 MMHG | WEIGHT: 133.2 LBS | TEMPERATURE: 98 F

## 2023-06-30 PROCEDURE — 99213 OFFICE O/P EST LOW 20 MIN: CPT | Performed by: RADIOLOGY

## 2023-06-30 ASSESSMENT — PAIN SCALES - GENERAL: PAINLEVEL_OUTOF10: 0

## 2023-07-03 ENCOUNTER — OFFICE VISIT (OUTPATIENT)
Dept: GYNECOLOGIC ONCOLOGY | Age: 38
End: 2023-07-03
Payer: MEDICAID

## 2023-07-03 VITALS
BODY MASS INDEX: 23.99 KG/M2 | SYSTOLIC BLOOD PRESSURE: 117 MMHG | TEMPERATURE: 97.3 F | OXYGEN SATURATION: 100 % | HEART RATE: 75 BPM | WEIGHT: 135.4 LBS | HEIGHT: 63 IN | DIASTOLIC BLOOD PRESSURE: 78 MMHG

## 2023-07-03 DIAGNOSIS — C53.0 MALIGNANT NEOPLASM OF ENDOCERVIX (HCC): Primary | ICD-10-CM

## 2023-07-03 PROCEDURE — 99214 OFFICE O/P EST MOD 30 MIN: CPT | Performed by: OBSTETRICS & GYNECOLOGY

## 2023-07-03 ASSESSMENT — ENCOUNTER SYMPTOMS
RESPIRATORY NEGATIVE: 1
EYES NEGATIVE: 1
GASTROINTESTINAL NEGATIVE: 1

## 2023-07-03 NOTE — PROGRESS NOTES
Gyn Oncology Attending Note    Asked by Dr Rakesh Ford to re examine this patient today. I revd her notes, records, imaging, labs, history in detail today  I spent 30 minutes managing this case in the office today and counseling the patient. 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 No vaginal involvement  No parametrial extension  No uterus masses. No adnexal masses  Abdomen non tender  No adenopathy noted  No s/s DVT today    The patient and I revd her imaging and exam findings. We discussed her pathology results. I explained again to her all of her options for treatment and all of the risks/downstream consequences of surgical therapy vs RT vs multi modality therapy  We discussed post op adjuvant RT (possibly chemo as well). I answered all of her questions to her satisfaction today. I explained that even with radical surgery, some women will require adjuvant therapy to mitigate the risk for downstream recurrence of the cancer. At the end of our appointment today, the patient tells me that she wants to move forward with radical hysterectomy, salpingectomy, ovarian preservation in situ, pelvic node removal (sentinel or conventional)    She has some scheduling issues (I offered her the operation next week) and she wants to wait for the operation until later on in July 2023.   Dr Rakesh Ford is aware and will be operating on this patient in the weeks ahead  We started the pre op planning and patient consent process today      LAVERNE Mckeon MD

## 2023-07-05 ENCOUNTER — TELEPHONE (OUTPATIENT)
Dept: ONCOLOGY | Age: 38
End: 2023-07-05

## 2023-07-05 NOTE — TELEPHONE ENCOUNTER
Name: Erum Louis  : 1985  MRN: [id number]    Oncology Navigation Follow-Up Note    Contact Type:  Telephone    Notes: Upon review of chart noted pt completed 7/3 Dr. Nayak Sunil f/u & tx decision to proceed w/surgical intervention. Spoke w/pt for f/u call. Pt denied questions/concerns. Pt stated going OOT -7/15 & plan for surgery upon return. Encouraged pt to contact writer prn. Will continue to follow.     Electronically signed by Steven Fofana RN on 2023 at 11:40 AM

## 2023-07-06 ENCOUNTER — TELEPHONE (OUTPATIENT)
Dept: GYNECOLOGIC ONCOLOGY | Age: 38
End: 2023-07-06

## 2023-07-06 NOTE — TELEPHONE ENCOUNTER
Pt called asking if her surgery was scheduled yet. Pt did state that she is going out of town next week. Please call pt with surgery information.

## 2023-07-07 NOTE — TELEPHONE ENCOUNTER
Patient notified surgery scheduled at [facility] on 7/17/23 at 7:30 am;arrive 5:30 am. [place] Fees same day as patient is leaving for vacation today. Post op appt 7/31/23 at 11:30 am.Pt voiced understanding and has no questions. Asked patient if she is aware that [facility] will be out of network after 7/31/23. Patient states social worked completed a continuation of care form for her for all [employer] she sees. She states she will call insurance provider to check status. Provided phone number if this was not approved and she wishes to change to different Sophie.

## 2023-07-10 ENCOUNTER — PREP FOR PROCEDURE (OUTPATIENT)
Dept: GYNECOLOGIC ONCOLOGY | Age: 38
End: 2023-07-10

## 2023-07-10 DIAGNOSIS — C53.0 MALIGNANT NEOPLASM OF ENDOCERVIX (HCC): Primary | ICD-10-CM

## 2023-07-10 RX ORDER — SODIUM CHLORIDE 9 MG/ML
INJECTION, SOLUTION INTRAVENOUS CONTINUOUS
Status: CANCELLED | OUTPATIENT
Start: 2023-07-10

## 2023-07-10 RX ORDER — SODIUM CHLORIDE 0.9 % (FLUSH) 0.9 %
5-40 SYRINGE (ML) INJECTION EVERY 12 HOURS SCHEDULED
Status: CANCELLED | OUTPATIENT
Start: 2023-07-10

## 2023-07-10 RX ORDER — SODIUM CHLORIDE 9 MG/ML
INJECTION, SOLUTION INTRAVENOUS PRN
Status: CANCELLED | OUTPATIENT
Start: 2023-07-10

## 2023-07-10 RX ORDER — SODIUM CHLORIDE 0.9 % (FLUSH) 0.9 %
5-40 SYRINGE (ML) INJECTION PRN
Status: CANCELLED | OUTPATIENT
Start: 2023-07-10

## 2023-07-10 RX ORDER — ENOXAPARIN SODIUM 100 MG/ML
40 INJECTION SUBCUTANEOUS ONCE
Status: CANCELLED | OUTPATIENT
Start: 2023-07-10 | End: 2023-07-10

## 2023-07-12 ENCOUNTER — TELEPHONE (OUTPATIENT)
Dept: ONCOLOGY | Age: 38
End: 2023-07-12

## 2023-07-12 NOTE — TELEPHONE ENCOUNTER
Name: Sarah Rios  : 1985  MRN: [id number]    Oncology Navigation Follow-Up Note    Contact Type:  Telephone    Notes: Upon review of chart noted pt scheduled for hysterectomy . Elizabeth Almanza, [facility] RO , updated on surgery date & requested dual RO/MO f/u scheduled to discuss final pathology. Elizabeth Almanza stated will contact [facility] [place]  to coordinate. Will continue to follow.     Electronically signed by Polly Singh RN on 2023 at 1:12 PM

## 2023-07-14 ENCOUNTER — TELEPHONE (OUTPATIENT)
Dept: GYNECOLOGIC ONCOLOGY | Age: 38
End: 2023-07-14

## 2023-07-14 NOTE — TELEPHONE ENCOUNTER
Surgery scheduler notified per  surgical procedure on 7/17/23 changed to robotic assisted intra-cervical ICG dye cervix injection,sentinel lymph node mapping,bilateral pelvic and para-aortic lymph node dissection,laparotomy,radical abdominal hysterectomy,bilateral salpingectomy,possible oophorectomy, any other indicated procedures. Tap block at end of procedure if needed.

## 2023-07-16 ENCOUNTER — ANESTHESIA EVENT (OUTPATIENT)
Dept: OPERATING ROOM | Age: 38
DRG: 512 | End: 2023-07-16
Payer: MEDICAID

## 2023-07-17 ENCOUNTER — HOSPITAL ENCOUNTER (INPATIENT)
Age: 38
LOS: 3 days | Discharge: HOME HEALTH | DRG: 512 | End: 2023-07-20
Attending: OBSTETRICS & GYNECOLOGY | Admitting: OBSTETRICS & GYNECOLOGY
Payer: MEDICAID

## 2023-07-17 ENCOUNTER — ANESTHESIA (OUTPATIENT)
Dept: OPERATING ROOM | Age: 38
DRG: 512 | End: 2023-07-17
Payer: MEDICAID

## 2023-07-17 DIAGNOSIS — C53.8 MALIGNANT NEOPLASM OF OVERLAPPING SITES OF CERVIX UTERI (HCC): ICD-10-CM

## 2023-07-17 DIAGNOSIS — C53.9 MALIGNANT NEOPLASM OF CERVIX, UNSPECIFIED SITE (HCC): ICD-10-CM

## 2023-07-17 DIAGNOSIS — G89.18 POST-OP PAIN: Primary | ICD-10-CM

## 2023-07-17 PROBLEM — M25.562 CHRONIC PAIN OF LEFT KNEE: Status: RESOLVED | Noted: 2022-11-21 | Resolved: 2023-07-17

## 2023-07-17 PROBLEM — D72.829 LEUKOCYTOSIS: Status: RESOLVED | Noted: 2023-03-16 | Resolved: 2023-07-17

## 2023-07-17 PROBLEM — Z79.631 LONG TERM (CURRENT) USE OF ANTIMETABOLITE AGENT: Status: RESOLVED | Noted: 2023-03-13 | Resolved: 2023-07-17

## 2023-07-17 PROBLEM — G89.29 CHRONIC PAIN OF LEFT KNEE: Status: RESOLVED | Noted: 2022-11-21 | Resolved: 2023-07-17

## 2023-07-17 PROBLEM — M54.42 ACUTE LEFT-SIDED LOW BACK PAIN WITH LEFT-SIDED SCIATICA: Status: RESOLVED | Noted: 2023-01-09 | Resolved: 2023-07-17

## 2023-07-17 PROBLEM — R42 DIZZINESS AND GIDDINESS: Status: RESOLVED | Noted: 2022-11-21 | Resolved: 2023-07-17

## 2023-07-17 LAB
ALBUMIN SERPL-MCNC: 3.6 G/DL (ref 3.5–5.2)
ALBUMIN SERPL-MCNC: 4.6 G/DL (ref 3.5–5.2)
ALBUMIN/GLOB SERPL: 1.8 {RATIO} (ref 1–2.5)
ALBUMIN/GLOB SERPL: 2.4 {RATIO} (ref 1–2.5)
ALP SERPL-CCNC: 38 U/L (ref 35–104)
ALP SERPL-CCNC: 78 U/L (ref 35–104)
ALT SERPL-CCNC: 10 U/L (ref 5–33)
ALT SERPL-CCNC: 18 U/L (ref 5–33)
ANION GAP SERPL CALCULATED.3IONS-SCNC: 12 MMOL/L (ref 9–17)
ANION GAP SERPL CALCULATED.3IONS-SCNC: 14 MMOL/L (ref 9–17)
AST SERPL-CCNC: 20 U/L
AST SERPL-CCNC: 24 U/L
BASOPHILS # BLD: 0 K/UL (ref 0–0.2)
BASOPHILS # BLD: 0.04 K/UL (ref 0–0.2)
BASOPHILS NFR BLD: 0 % (ref 0–2)
BASOPHILS NFR BLD: 0 % (ref 0–2)
BILIRUB SERPL-MCNC: 0.4 MG/DL (ref 0.3–1.2)
BILIRUB SERPL-MCNC: 0.5 MG/DL (ref 0.3–1.2)
BUN SERPL-MCNC: 11 MG/DL (ref 6–20)
BUN SERPL-MCNC: 8 MG/DL (ref 6–20)
CALCIUM SERPL-MCNC: 7.8 MG/DL (ref 8.6–10.4)
CALCIUM SERPL-MCNC: 9.4 MG/DL (ref 8.6–10.4)
CHLORIDE SERPL-SCNC: 101 MMOL/L (ref 98–107)
CHLORIDE SERPL-SCNC: 103 MMOL/L (ref 98–107)
CO2 SERPL-SCNC: 18 MMOL/L (ref 20–31)
CO2 SERPL-SCNC: 22 MMOL/L (ref 20–31)
CREAT SERPL-MCNC: 0.6 MG/DL (ref 0.5–0.9)
CREAT SERPL-MCNC: 0.6 MG/DL (ref 0.5–0.9)
EOSINOPHIL # BLD: 0 K/UL (ref 0–0.4)
EOSINOPHIL # BLD: 0.07 K/UL (ref 0–0.44)
EOSINOPHILS RELATIVE PERCENT: 0 % (ref 1–4)
EOSINOPHILS RELATIVE PERCENT: 1 % (ref 1–4)
ERYTHROCYTE [DISTWIDTH] IN BLOOD BY AUTOMATED COUNT: 13.6 % (ref 11.8–14.4)
ERYTHROCYTE [DISTWIDTH] IN BLOOD BY AUTOMATED COUNT: 13.7 % (ref 11.8–14.4)
GFR SERPL CREATININE-BSD FRML MDRD: >60 ML/MIN/1.73M2
GFR SERPL CREATININE-BSD FRML MDRD: >60 ML/MIN/1.73M2
GLUCOSE SERPL-MCNC: 192 MG/DL (ref 70–99)
GLUCOSE SERPL-MCNC: 87 MG/DL (ref 70–99)
HCG SERPL QL: NEGATIVE
HCT VFR BLD AUTO: 20.2 % (ref 36.3–47.1)
HCT VFR BLD AUTO: 21.5 % (ref 36.3–47.1)
HCT VFR BLD AUTO: 22.4 % (ref 36.3–47.1)
HCT VFR BLD AUTO: 40.8 % (ref 36.3–47.1)
HGB BLD-MCNC: 13.6 G/DL (ref 11.9–15.1)
HGB BLD-MCNC: 7.1 G/DL (ref 11.9–15.1)
HGB BLD-MCNC: 7.1 G/DL (ref 11.9–15.1)
HGB BLD-MCNC: 7.3 G/DL (ref 11.9–15.1)
IMM GRANULOCYTES # BLD AUTO: 0 K/UL (ref 0–0.3)
IMM GRANULOCYTES # BLD AUTO: 0.06 K/UL (ref 0–0.3)
IMM GRANULOCYTES NFR BLD: 0 %
IMM GRANULOCYTES NFR BLD: 1 %
LYMPHOCYTES # BLD: 24 % (ref 24–43)
LYMPHOCYTES # BLD: 6 % (ref 24–44)
LYMPHOCYTES NFR BLD: 0.85 K/UL (ref 1–4.8)
LYMPHOCYTES NFR BLD: 2.12 K/UL (ref 1.1–3.7)
MCH RBC QN AUTO: 30.7 PG (ref 25.2–33.5)
MCH RBC QN AUTO: 31.6 PG (ref 25.2–33.5)
MCHC RBC AUTO-ENTMCNC: 31.7 G/DL (ref 28.4–34.8)
MCHC RBC AUTO-ENTMCNC: 33.3 G/DL (ref 28.4–34.8)
MCV RBC AUTO: 92.1 FL (ref 82.6–102.9)
MCV RBC AUTO: 99.6 FL (ref 82.6–102.9)
MONOCYTES NFR BLD: 0.28 K/UL (ref 0.1–0.8)
MONOCYTES NFR BLD: 0.73 K/UL (ref 0.1–1.2)
MONOCYTES NFR BLD: 2 % (ref 1–7)
MONOCYTES NFR BLD: 8 % (ref 3–12)
MORPHOLOGY: NORMAL
NEUTROPHILS NFR BLD: 66 % (ref 36–65)
NEUTROPHILS NFR BLD: 92 % (ref 36–66)
NEUTS SEG NFR BLD: 12.97 K/UL (ref 1.8–7.7)
NEUTS SEG NFR BLD: 5.89 K/UL (ref 1.5–8.1)
NRBC BLD-RTO: 0 PER 100 WBC
NRBC BLD-RTO: 0 PER 100 WBC
PLATELET # BLD AUTO: 149 K/UL (ref 138–453)
PLATELET # BLD AUTO: 228 K/UL (ref 138–453)
PMV BLD AUTO: 10.5 FL (ref 8.1–13.5)
PMV BLD AUTO: 9.5 FL (ref 8.1–13.5)
POTASSIUM SERPL-SCNC: 4 MMOL/L (ref 3.7–5.3)
POTASSIUM SERPL-SCNC: 4.4 MMOL/L (ref 3.7–5.3)
PROT SERPL-MCNC: 5.1 G/DL (ref 6.4–8.3)
PROT SERPL-MCNC: 7.1 G/DL (ref 6.4–8.3)
RBC # BLD AUTO: 2.25 M/UL (ref 3.95–5.11)
RBC # BLD AUTO: 4.43 M/UL (ref 3.95–5.11)
SODIUM SERPL-SCNC: 133 MMOL/L (ref 135–144)
SODIUM SERPL-SCNC: 137 MMOL/L (ref 135–144)
WBC OTHER # BLD: 14.1 K/UL (ref 3.5–11.3)
WBC OTHER # BLD: 8.9 K/UL (ref 3.5–11.3)

## 2023-07-17 PROCEDURE — 86900 BLOOD TYPING SEROLOGIC ABO: CPT

## 2023-07-17 PROCEDURE — 6360000002 HC RX W HCPCS: Performed by: STUDENT IN AN ORGANIZED HEALTH CARE EDUCATION/TRAINING PROGRAM

## 2023-07-17 PROCEDURE — 84703 CHORIONIC GONADOTROPIN ASSAY: CPT

## 2023-07-17 PROCEDURE — C9399 UNCLASSIFIED DRUGS OR BIOLOG: HCPCS | Performed by: ANESTHESIOLOGY

## 2023-07-17 PROCEDURE — 85014 HEMATOCRIT: CPT

## 2023-07-17 PROCEDURE — 2500000003 HC RX 250 WO HCPCS: Performed by: ANESTHESIOLOGY

## 2023-07-17 PROCEDURE — 3700000001 HC ADD 15 MINUTES (ANESTHESIA): Performed by: OBSTETRICS & GYNECOLOGY

## 2023-07-17 PROCEDURE — 8E0W4CZ ROBOTIC ASSISTED PROCEDURE OF TRUNK REGION, PERCUTANEOUS ENDOSCOPIC APPROACH: ICD-10-PCS | Performed by: OBSTETRICS & GYNECOLOGY

## 2023-07-17 PROCEDURE — 2580000003 HC RX 258: Performed by: STUDENT IN AN ORGANIZED HEALTH CARE EDUCATION/TRAINING PROGRAM

## 2023-07-17 PROCEDURE — 6370000000 HC RX 637 (ALT 250 FOR IP): Performed by: STUDENT IN AN ORGANIZED HEALTH CARE EDUCATION/TRAINING PROGRAM

## 2023-07-17 PROCEDURE — 80053 COMPREHEN METABOLIC PANEL: CPT

## 2023-07-17 PROCEDURE — 6360000002 HC RX W HCPCS: Performed by: PHYSICIAN ASSISTANT

## 2023-07-17 PROCEDURE — 36430 TRANSFUSION BLD/BLD COMPNT: CPT

## 2023-07-17 PROCEDURE — 88332 PATH CONSLTJ SURG EA ADD BLK: CPT

## 2023-07-17 PROCEDURE — 86901 BLOOD TYPING SEROLOGIC RH(D): CPT

## 2023-07-17 PROCEDURE — S2900 ROBOTIC SURGICAL SYSTEM: HCPCS | Performed by: OBSTETRICS & GYNECOLOGY

## 2023-07-17 PROCEDURE — 85018 HEMOGLOBIN: CPT

## 2023-07-17 PROCEDURE — 86920 COMPATIBILITY TEST SPIN: CPT

## 2023-07-17 PROCEDURE — 85027 COMPLETE CBC AUTOMATED: CPT

## 2023-07-17 PROCEDURE — 86850 RBC ANTIBODY SCREEN: CPT

## 2023-07-17 PROCEDURE — 2709999900 HC NON-CHARGEABLE SUPPLY: Performed by: OBSTETRICS & GYNECOLOGY

## 2023-07-17 PROCEDURE — 2500000003 HC RX 250 WO HCPCS: Performed by: OBSTETRICS & GYNECOLOGY

## 2023-07-17 PROCEDURE — 4A1605H MONITORING OF LYMPHATIC FLOW USING INDOCYANINE GREEN DYE, OPEN APPROACH: ICD-10-PCS | Performed by: OBSTETRICS & GYNECOLOGY

## 2023-07-17 PROCEDURE — 2720000010 HC SURG SUPPLY STERILE: Performed by: OBSTETRICS & GYNECOLOGY

## 2023-07-17 PROCEDURE — 07BC0ZX EXCISION OF PELVIS LYMPHATIC, OPEN APPROACH, DIAGNOSTIC: ICD-10-PCS | Performed by: OBSTETRICS & GYNECOLOGY

## 2023-07-17 PROCEDURE — 88309 TISSUE EXAM BY PATHOLOGIST: CPT

## 2023-07-17 PROCEDURE — 3600000009 HC SURGERY ROBOT BASE: Performed by: OBSTETRICS & GYNECOLOGY

## 2023-07-17 PROCEDURE — 7100000000 HC PACU RECOVERY - FIRST 15 MIN: Performed by: OBSTETRICS & GYNECOLOGY

## 2023-07-17 PROCEDURE — 88331 PATH CONSLTJ SURG 1 BLK 1SPC: CPT

## 2023-07-17 PROCEDURE — 07BD0ZX EXCISION OF AORTIC LYMPHATIC, OPEN APPROACH, DIAGNOSTIC: ICD-10-PCS | Performed by: OBSTETRICS & GYNECOLOGY

## 2023-07-17 PROCEDURE — 0UT90ZZ RESECTION OF UTERUS, OPEN APPROACH: ICD-10-PCS | Performed by: OBSTETRICS & GYNECOLOGY

## 2023-07-17 PROCEDURE — 3700000000 HC ANESTHESIA ATTENDED CARE: Performed by: OBSTETRICS & GYNECOLOGY

## 2023-07-17 PROCEDURE — 88342 IMHCHEM/IMCYTCHM 1ST ANTB: CPT

## 2023-07-17 PROCEDURE — 6360000002 HC RX W HCPCS: Performed by: OBSTETRICS & GYNECOLOGY

## 2023-07-17 PROCEDURE — 2580000003 HC RX 258: Performed by: OBSTETRICS & GYNECOLOGY

## 2023-07-17 PROCEDURE — P9016 RBC LEUKOCYTES REDUCED: HCPCS

## 2023-07-17 PROCEDURE — 88304 TISSUE EXAM BY PATHOLOGIST: CPT

## 2023-07-17 PROCEDURE — 36415 COLL VENOUS BLD VENIPUNCTURE: CPT

## 2023-07-17 PROCEDURE — P9045 ALBUMIN (HUMAN), 5%, 250 ML: HCPCS | Performed by: ANESTHESIOLOGY

## 2023-07-17 PROCEDURE — 6360000002 HC RX W HCPCS: Performed by: ANESTHESIOLOGY

## 2023-07-17 PROCEDURE — 2580000003 HC RX 258: Performed by: ANESTHESIOLOGY

## 2023-07-17 PROCEDURE — C9290 INJ, BUPIVACAINE LIPOSOME: HCPCS | Performed by: OBSTETRICS & GYNECOLOGY

## 2023-07-17 PROCEDURE — 88307 TISSUE EXAM BY PATHOLOGIST: CPT

## 2023-07-17 PROCEDURE — 1200000000 HC SEMI PRIVATE

## 2023-07-17 PROCEDURE — 7100000001 HC PACU RECOVERY - ADDTL 15 MIN: Performed by: OBSTETRICS & GYNECOLOGY

## 2023-07-17 PROCEDURE — 3600000019 HC SURGERY ROBOT ADDTL 15MIN: Performed by: OBSTETRICS & GYNECOLOGY

## 2023-07-17 RX ORDER — SENNA AND DOCUSATE SODIUM 50; 8.6 MG/1; MG/1
2 TABLET, FILM COATED ORAL 2 TIMES DAILY
Status: DISCONTINUED | OUTPATIENT
Start: 2023-07-17 | End: 2023-07-20 | Stop reason: HOSPADM

## 2023-07-17 RX ORDER — LIDOCAINE 4 G/G
1 PATCH TOPICAL DAILY
Status: DISCONTINUED | OUTPATIENT
Start: 2023-07-17 | End: 2023-07-17

## 2023-07-17 RX ORDER — FAMOTIDINE 20 MG/1
20 TABLET, FILM COATED ORAL 2 TIMES DAILY PRN
Status: DISCONTINUED | OUTPATIENT
Start: 2023-07-17 | End: 2023-07-20 | Stop reason: HOSPADM

## 2023-07-17 RX ORDER — ONDANSETRON 2 MG/ML
4 INJECTION INTRAMUSCULAR; INTRAVENOUS EVERY 6 HOURS PRN
Status: DISCONTINUED | OUTPATIENT
Start: 2023-07-17 | End: 2023-07-20 | Stop reason: HOSPADM

## 2023-07-17 RX ORDER — SODIUM CHLORIDE 0.9 % (FLUSH) 0.9 %
5-40 SYRINGE (ML) INJECTION EVERY 12 HOURS SCHEDULED
Status: DISCONTINUED | OUTPATIENT
Start: 2023-07-17 | End: 2023-07-17 | Stop reason: HOSPADM

## 2023-07-17 RX ORDER — GABAPENTIN 300 MG/1
300 CAPSULE ORAL 3 TIMES DAILY
Qty: 90 CAPSULE | Refills: 1 | Status: SHIPPED | OUTPATIENT
Start: 2023-07-17 | End: 2023-09-15

## 2023-07-17 RX ORDER — SENNOSIDES 8.6 MG
2 TABLET ORAL DAILY
Qty: 120 TABLET | Refills: 4 | Status: SHIPPED | OUTPATIENT
Start: 2023-07-17

## 2023-07-17 RX ORDER — ENOXAPARIN SODIUM 100 MG/ML
40 INJECTION SUBCUTANEOUS ONCE
Status: COMPLETED | OUTPATIENT
Start: 2023-07-17 | End: 2023-07-17

## 2023-07-17 RX ORDER — SODIUM CHLORIDE, SODIUM LACTATE, POTASSIUM CHLORIDE, CALCIUM CHLORIDE 600; 310; 30; 20 MG/100ML; MG/100ML; MG/100ML; MG/100ML
INJECTION, SOLUTION INTRAVENOUS CONTINUOUS
Status: DISCONTINUED | OUTPATIENT
Start: 2023-07-17 | End: 2023-07-18

## 2023-07-17 RX ORDER — ROCURONIUM BROMIDE 10 MG/ML
INJECTION, SOLUTION INTRAVENOUS PRN
Status: DISCONTINUED | OUTPATIENT
Start: 2023-07-17 | End: 2023-07-17 | Stop reason: SDUPTHER

## 2023-07-17 RX ORDER — CALCIUM CARBONATE 500 MG/1
500 TABLET, CHEWABLE ORAL 3 TIMES DAILY PRN
Status: DISCONTINUED | OUTPATIENT
Start: 2023-07-17 | End: 2023-07-20 | Stop reason: HOSPADM

## 2023-07-17 RX ORDER — ALBUMIN, HUMAN INJ 5% 5 %
SOLUTION INTRAVENOUS PRN
Status: DISCONTINUED | OUTPATIENT
Start: 2023-07-17 | End: 2023-07-17 | Stop reason: SDUPTHER

## 2023-07-17 RX ORDER — DEXAMETHASONE SODIUM PHOSPHATE 10 MG/ML
INJECTION INTRAMUSCULAR; INTRAVENOUS PRN
Status: DISCONTINUED | OUTPATIENT
Start: 2023-07-17 | End: 2023-07-17 | Stop reason: SDUPTHER

## 2023-07-17 RX ORDER — CELECOXIB 200 MG/1
200 CAPSULE ORAL DAILY
Qty: 30 CAPSULE | Refills: 1 | Status: SHIPPED | OUTPATIENT
Start: 2023-07-17

## 2023-07-17 RX ORDER — ACETAMINOPHEN 500 MG
1000 TABLET ORAL EVERY 6 HOURS PRN
Qty: 120 TABLET | Refills: 1 | Status: SHIPPED | OUTPATIENT
Start: 2023-07-17

## 2023-07-17 RX ORDER — SODIUM CHLORIDE 9 MG/ML
INJECTION, SOLUTION INTRAVENOUS CONTINUOUS
Status: DISCONTINUED | OUTPATIENT
Start: 2023-07-17 | End: 2023-07-17 | Stop reason: HOSPADM

## 2023-07-17 RX ORDER — FENTANYL CITRATE 50 UG/ML
INJECTION, SOLUTION INTRAMUSCULAR; INTRAVENOUS PRN
Status: DISCONTINUED | OUTPATIENT
Start: 2023-07-17 | End: 2023-07-17 | Stop reason: SDUPTHER

## 2023-07-17 RX ORDER — OXYCODONE HYDROCHLORIDE 5 MG/1
10 TABLET ORAL EVERY 4 HOURS PRN
Status: DISCONTINUED | OUTPATIENT
Start: 2023-07-17 | End: 2023-07-20 | Stop reason: HOSPADM

## 2023-07-17 RX ORDER — OXYCODONE HYDROCHLORIDE 5 MG/1
5 TABLET ORAL EVERY 4 HOURS PRN
Status: DISCONTINUED | OUTPATIENT
Start: 2023-07-17 | End: 2023-07-20 | Stop reason: HOSPADM

## 2023-07-17 RX ORDER — SCOLOPAMINE TRANSDERMAL SYSTEM 1 MG/1
1 PATCH, EXTENDED RELEASE TRANSDERMAL
Status: DISCONTINUED | OUTPATIENT
Start: 2023-07-17 | End: 2023-07-20 | Stop reason: HOSPADM

## 2023-07-17 RX ORDER — CELECOXIB 100 MG/1
200 CAPSULE ORAL 2 TIMES DAILY
Status: DISCONTINUED | OUTPATIENT
Start: 2023-07-17 | End: 2023-07-20 | Stop reason: HOSPADM

## 2023-07-17 RX ORDER — GABAPENTIN 300 MG/1
300 CAPSULE ORAL 3 TIMES DAILY
Status: DISCONTINUED | OUTPATIENT
Start: 2023-07-17 | End: 2023-07-20 | Stop reason: HOSPADM

## 2023-07-17 RX ORDER — CYCLOBENZAPRINE HCL 10 MG
10 TABLET ORAL 3 TIMES DAILY PRN
Status: DISCONTINUED | OUTPATIENT
Start: 2023-07-17 | End: 2023-07-20 | Stop reason: HOSPADM

## 2023-07-17 RX ORDER — SODIUM CHLORIDE 9 MG/ML
INJECTION, SOLUTION INTRAVENOUS PRN
Status: DISCONTINUED | OUTPATIENT
Start: 2023-07-17 | End: 2023-07-20 | Stop reason: HOSPADM

## 2023-07-17 RX ORDER — SODIUM CHLORIDE, SODIUM LACTATE, POTASSIUM CHLORIDE, CALCIUM CHLORIDE 600; 310; 30; 20 MG/100ML; MG/100ML; MG/100ML; MG/100ML
INJECTION, SOLUTION INTRAVENOUS CONTINUOUS PRN
Status: DISCONTINUED | OUTPATIENT
Start: 2023-07-17 | End: 2023-07-17 | Stop reason: SDUPTHER

## 2023-07-17 RX ORDER — INDOCYANINE GREEN AND WATER 25 MG
KIT INJECTION PRN
Status: DISCONTINUED | OUTPATIENT
Start: 2023-07-17 | End: 2023-07-17 | Stop reason: HOSPADM

## 2023-07-17 RX ORDER — MIDAZOLAM HYDROCHLORIDE 1 MG/ML
INJECTION INTRAMUSCULAR; INTRAVENOUS PRN
Status: DISCONTINUED | OUTPATIENT
Start: 2023-07-17 | End: 2023-07-17 | Stop reason: SDUPTHER

## 2023-07-17 RX ORDER — SODIUM CHLORIDE 0.9 % (FLUSH) 0.9 %
5-40 SYRINGE (ML) INJECTION PRN
Status: DISCONTINUED | OUTPATIENT
Start: 2023-07-17 | End: 2023-07-17 | Stop reason: HOSPADM

## 2023-07-17 RX ORDER — MAGNESIUM SULFATE HEPTAHYDRATE 40 MG/ML
INJECTION, SOLUTION INTRAVENOUS PRN
Status: DISCONTINUED | OUTPATIENT
Start: 2023-07-17 | End: 2023-07-17 | Stop reason: SDUPTHER

## 2023-07-17 RX ORDER — ONDANSETRON 2 MG/ML
INJECTION INTRAMUSCULAR; INTRAVENOUS PRN
Status: DISCONTINUED | OUTPATIENT
Start: 2023-07-17 | End: 2023-07-17 | Stop reason: SDUPTHER

## 2023-07-17 RX ORDER — KETAMINE HCL IN NACL, ISO-OSM 100MG/10ML
SYRINGE (ML) INJECTION PRN
Status: DISCONTINUED | OUTPATIENT
Start: 2023-07-17 | End: 2023-07-17 | Stop reason: SDUPTHER

## 2023-07-17 RX ORDER — EPHEDRINE SULFATE/0.9% NACL/PF 50 MG/5 ML
SYRINGE (ML) INTRAVENOUS PRN
Status: DISCONTINUED | OUTPATIENT
Start: 2023-07-17 | End: 2023-07-17 | Stop reason: SDUPTHER

## 2023-07-17 RX ORDER — SODIUM CHLORIDE 0.9 % (FLUSH) 0.9 %
5-40 SYRINGE (ML) INJECTION PRN
Status: DISCONTINUED | OUTPATIENT
Start: 2023-07-17 | End: 2023-07-20 | Stop reason: HOSPADM

## 2023-07-17 RX ORDER — SODIUM CHLORIDE 9 MG/ML
INJECTION, SOLUTION INTRAVENOUS PRN
Status: DISCONTINUED | OUTPATIENT
Start: 2023-07-17 | End: 2023-07-18

## 2023-07-17 RX ORDER — MAGNESIUM HYDROXIDE 1200 MG/15ML
LIQUID ORAL CONTINUOUS PRN
Status: DISCONTINUED | OUTPATIENT
Start: 2023-07-17 | End: 2023-07-17 | Stop reason: HOSPADM

## 2023-07-17 RX ORDER — PROPOFOL 10 MG/ML
INJECTION, EMULSION INTRAVENOUS PRN
Status: DISCONTINUED | OUTPATIENT
Start: 2023-07-17 | End: 2023-07-17 | Stop reason: SDUPTHER

## 2023-07-17 RX ORDER — PROCHLORPERAZINE EDISYLATE 5 MG/ML
10 INJECTION INTRAMUSCULAR; INTRAVENOUS EVERY 6 HOURS PRN
Status: DISCONTINUED | OUTPATIENT
Start: 2023-07-17 | End: 2023-07-20 | Stop reason: HOSPADM

## 2023-07-17 RX ORDER — ENOXAPARIN SODIUM 100 MG/ML
40 INJECTION SUBCUTANEOUS DAILY
Qty: 8.4 ML | Refills: 0 | Status: SHIPPED | OUTPATIENT
Start: 2023-07-17 | End: 2023-07-20 | Stop reason: SDUPTHER

## 2023-07-17 RX ORDER — PHENYLEPHRINE HCL IN 0.9% NACL 1 MG/10 ML
SYRINGE (ML) INTRAVENOUS PRN
Status: DISCONTINUED | OUTPATIENT
Start: 2023-07-17 | End: 2023-07-17 | Stop reason: SDUPTHER

## 2023-07-17 RX ORDER — SODIUM CHLORIDE 9 MG/ML
INJECTION, SOLUTION INTRAVENOUS PRN
Status: DISCONTINUED | OUTPATIENT
Start: 2023-07-17 | End: 2023-07-17 | Stop reason: HOSPADM

## 2023-07-17 RX ORDER — ENOXAPARIN SODIUM 100 MG/ML
40 INJECTION SUBCUTANEOUS DAILY
Status: DISCONTINUED | OUTPATIENT
Start: 2023-07-18 | End: 2023-07-20 | Stop reason: HOSPADM

## 2023-07-17 RX ORDER — ACETAMINOPHEN 500 MG
1000 TABLET ORAL EVERY 6 HOURS
Status: DISCONTINUED | OUTPATIENT
Start: 2023-07-17 | End: 2023-07-20 | Stop reason: HOSPADM

## 2023-07-17 RX ORDER — LIDOCAINE HYDROCHLORIDE 10 MG/ML
INJECTION, SOLUTION EPIDURAL; INFILTRATION; INTRACAUDAL; PERINEURAL PRN
Status: DISCONTINUED | OUTPATIENT
Start: 2023-07-17 | End: 2023-07-17 | Stop reason: SDUPTHER

## 2023-07-17 RX ORDER — LIDOCAINE 4 G/G
1 PATCH TOPICAL DAILY PRN
Status: DISCONTINUED | OUTPATIENT
Start: 2023-07-17 | End: 2023-07-20 | Stop reason: HOSPADM

## 2023-07-17 RX ORDER — METOCLOPRAMIDE HYDROCHLORIDE 5 MG/ML
10 INJECTION INTRAMUSCULAR; INTRAVENOUS EVERY 6 HOURS PRN
Status: DISCONTINUED | OUTPATIENT
Start: 2023-07-17 | End: 2023-07-20 | Stop reason: HOSPADM

## 2023-07-17 RX ORDER — SODIUM CHLORIDE 0.9 % (FLUSH) 0.9 %
5-40 SYRINGE (ML) INJECTION EVERY 12 HOURS SCHEDULED
Status: DISCONTINUED | OUTPATIENT
Start: 2023-07-17 | End: 2023-07-20 | Stop reason: HOSPADM

## 2023-07-17 RX ORDER — ONDANSETRON 4 MG/1
4 TABLET, FILM COATED ORAL EVERY 8 HOURS PRN
Qty: 30 TABLET | Refills: 1 | Status: SHIPPED | OUTPATIENT
Start: 2023-07-17

## 2023-07-17 RX ORDER — OXYCODONE HYDROCHLORIDE 5 MG/1
5 TABLET ORAL EVERY 6 HOURS PRN
Qty: 20 TABLET | Refills: 0 | Status: SHIPPED | OUTPATIENT
Start: 2023-07-17 | End: 2023-07-22

## 2023-07-17 RX ORDER — DIPHENHYDRAMINE HCL 25 MG
25 TABLET ORAL EVERY 6 HOURS PRN
Status: DISCONTINUED | OUTPATIENT
Start: 2023-07-17 | End: 2023-07-20 | Stop reason: HOSPADM

## 2023-07-17 RX ADMIN — ROCURONIUM BROMIDE 20 MG: 10 INJECTION, SOLUTION INTRAVENOUS at 09:41

## 2023-07-17 RX ADMIN — DEXAMETHASONE SODIUM PHOSPHATE 10 MG: 10 INJECTION INTRAMUSCULAR; INTRAVENOUS at 08:00

## 2023-07-17 RX ADMIN — ENOXAPARIN SODIUM 40 MG: 100 INJECTION SUBCUTANEOUS at 06:43

## 2023-07-17 RX ADMIN — Medication 10 MG: at 13:14

## 2023-07-17 RX ADMIN — ONDANSETRON 4 MG: 2 INJECTION INTRAMUSCULAR; INTRAVENOUS at 18:04

## 2023-07-17 RX ADMIN — Medication 10 MG: at 12:32

## 2023-07-17 RX ADMIN — HYDROMORPHONE HYDROCHLORIDE 0.2 MG: 1 INJECTION, SOLUTION INTRAMUSCULAR; INTRAVENOUS; SUBCUTANEOUS at 14:10

## 2023-07-17 RX ADMIN — ALBUMIN (HUMAN) 12.5 G: 12.5 INJECTION, SOLUTION INTRAVENOUS at 13:08

## 2023-07-17 RX ADMIN — DOCUSATE SODIUM 50 MG AND SENNOSIDES 8.6 MG 2 TABLET: 8.6; 5 TABLET, FILM COATED ORAL at 21:21

## 2023-07-17 RX ADMIN — Medication 10 MG: at 11:00

## 2023-07-17 RX ADMIN — Medication 200 MCG: at 12:55

## 2023-07-17 RX ADMIN — ONDANSETRON 4 MG: 2 INJECTION INTRAMUSCULAR; INTRAVENOUS at 22:02

## 2023-07-17 RX ADMIN — HYDROMORPHONE HYDROCHLORIDE 0.2 MG: 1 INJECTION, SOLUTION INTRAMUSCULAR; INTRAVENOUS; SUBCUTANEOUS at 14:37

## 2023-07-17 RX ADMIN — SODIUM CHLORIDE, SODIUM LACTATE, POTASSIUM CHLORIDE, CALCIUM CHLORIDE: 600; 310; 30; 20 INJECTION, SOLUTION INTRAVENOUS at 13:45

## 2023-07-17 RX ADMIN — Medication 50 MCG: at 11:23

## 2023-07-17 RX ADMIN — ROCURONIUM BROMIDE 10 MG: 10 INJECTION, SOLUTION INTRAVENOUS at 13:12

## 2023-07-17 RX ADMIN — Medication 100 MCG: at 12:07

## 2023-07-17 RX ADMIN — FENTANYL CITRATE 50 MCG: 50 INJECTION, SOLUTION INTRAMUSCULAR; INTRAVENOUS at 07:41

## 2023-07-17 RX ADMIN — Medication 100 MCG: at 14:15

## 2023-07-17 RX ADMIN — Medication 100 MCG: at 12:13

## 2023-07-17 RX ADMIN — Medication 100 MCG: at 12:23

## 2023-07-17 RX ADMIN — HYDROMORPHONE HYDROCHLORIDE 0.2 MG: 1 INJECTION, SOLUTION INTRAMUSCULAR; INTRAVENOUS; SUBCUTANEOUS at 12:10

## 2023-07-17 RX ADMIN — SODIUM CHLORIDE, POTASSIUM CHLORIDE, SODIUM LACTATE AND CALCIUM CHLORIDE: 600; 310; 30; 20 INJECTION, SOLUTION INTRAVENOUS at 07:35

## 2023-07-17 RX ADMIN — Medication 200 MCG: at 13:02

## 2023-07-17 RX ADMIN — HYDROMORPHONE HYDROCHLORIDE 0.5 MG: 1 INJECTION, SOLUTION INTRAMUSCULAR; INTRAVENOUS; SUBCUTANEOUS at 18:03

## 2023-07-17 RX ADMIN — FENTANYL CITRATE 50 MCG: 50 INJECTION, SOLUTION INTRAMUSCULAR; INTRAVENOUS at 08:13

## 2023-07-17 RX ADMIN — ONDANSETRON 4 MG: 2 INJECTION INTRAMUSCULAR; INTRAVENOUS at 14:15

## 2023-07-17 RX ADMIN — Medication 200 MCG: at 13:28

## 2023-07-17 RX ADMIN — Medication 2000 MG: at 07:57

## 2023-07-17 RX ADMIN — SODIUM CHLORIDE, SODIUM LACTATE, POTASSIUM CHLORIDE, CALCIUM CHLORIDE: 600; 310; 30; 20 INJECTION, SOLUTION INTRAVENOUS at 07:50

## 2023-07-17 RX ADMIN — SODIUM CHLORIDE, POTASSIUM CHLORIDE, SODIUM LACTATE AND CALCIUM CHLORIDE: 600; 310; 30; 20 INJECTION, SOLUTION INTRAVENOUS at 18:04

## 2023-07-17 RX ADMIN — ALBUMIN (HUMAN) 12.5 G: 12.5 INJECTION, SOLUTION INTRAVENOUS at 13:21

## 2023-07-17 RX ADMIN — Medication 10 MG: at 10:00

## 2023-07-17 RX ADMIN — HYDROMORPHONE HYDROCHLORIDE 1 MG: 1 INJECTION, SOLUTION INTRAMUSCULAR; INTRAVENOUS; SUBCUTANEOUS at 22:02

## 2023-07-17 RX ADMIN — Medication 100 MCG: at 11:37

## 2023-07-17 RX ADMIN — ROCURONIUM BROMIDE 10 MG: 10 INJECTION, SOLUTION INTRAVENOUS at 10:58

## 2023-07-17 RX ADMIN — ROCURONIUM BROMIDE 20 MG: 10 INJECTION, SOLUTION INTRAVENOUS at 11:45

## 2023-07-17 RX ADMIN — ROCURONIUM BROMIDE 10 MG: 10 INJECTION, SOLUTION INTRAVENOUS at 11:56

## 2023-07-17 RX ADMIN — Medication 20 MG: at 07:57

## 2023-07-17 RX ADMIN — Medication 10 MG: at 09:00

## 2023-07-17 RX ADMIN — Medication 100 MCG: at 13:04

## 2023-07-17 RX ADMIN — Medication 100 MCG: at 11:55

## 2023-07-17 RX ADMIN — MAGNESIUM SULFATE HEPTAHYDRATE 2000 MG: 40 INJECTION, SOLUTION INTRAVENOUS at 07:57

## 2023-07-17 RX ADMIN — SODIUM CHLORIDE, POTASSIUM CHLORIDE, SODIUM LACTATE AND CALCIUM CHLORIDE: 600; 310; 30; 20 INJECTION, SOLUTION INTRAVENOUS at 14:07

## 2023-07-17 RX ADMIN — HYDROMORPHONE HYDROCHLORIDE 0.2 MG: 1 INJECTION, SOLUTION INTRAMUSCULAR; INTRAVENOUS; SUBCUTANEOUS at 14:47

## 2023-07-17 RX ADMIN — Medication 50 MCG: at 11:35

## 2023-07-17 RX ADMIN — ROCURONIUM BROMIDE 50 MG: 10 INJECTION, SOLUTION INTRAVENOUS at 07:41

## 2023-07-17 RX ADMIN — ROCURONIUM BROMIDE 10 MG: 10 INJECTION, SOLUTION INTRAVENOUS at 08:41

## 2023-07-17 RX ADMIN — HYDROMORPHONE HYDROCHLORIDE 0.5 MG: 1 INJECTION, SOLUTION INTRAMUSCULAR; INTRAVENOUS; SUBCUTANEOUS at 15:11

## 2023-07-17 RX ADMIN — SODIUM CHLORIDE, POTASSIUM CHLORIDE, SODIUM LACTATE AND CALCIUM CHLORIDE: 600; 310; 30; 20 INJECTION, SOLUTION INTRAVENOUS at 11:14

## 2023-07-17 RX ADMIN — HYDROMORPHONE HYDROCHLORIDE 0.2 MG: 1 INJECTION, SOLUTION INTRAMUSCULAR; INTRAVENOUS; SUBCUTANEOUS at 15:00

## 2023-07-17 RX ADMIN — PROPOFOL 150 MG: 10 INJECTION, EMULSION INTRAVENOUS at 07:41

## 2023-07-17 RX ADMIN — Medication 2000 MG: at 11:56

## 2023-07-17 RX ADMIN — ROCURONIUM BROMIDE 10 MG: 10 INJECTION, SOLUTION INTRAVENOUS at 12:45

## 2023-07-17 RX ADMIN — PROCHLORPERAZINE EDISYLATE 10 MG: 5 INJECTION INTRAMUSCULAR; INTRAVENOUS at 19:35

## 2023-07-17 RX ADMIN — GABAPENTIN 300 MG: 300 CAPSULE ORAL at 21:21

## 2023-07-17 RX ADMIN — Medication 200 MCG: at 11:46

## 2023-07-17 RX ADMIN — SUGAMMADEX 200 MG: 100 INJECTION, SOLUTION INTRAVENOUS at 15:13

## 2023-07-17 RX ADMIN — ROCURONIUM BROMIDE 10 MG: 10 INJECTION, SOLUTION INTRAVENOUS at 10:16

## 2023-07-17 RX ADMIN — MIDAZOLAM 2 MG: 1 INJECTION INTRAMUSCULAR; INTRAVENOUS at 07:36

## 2023-07-17 RX ADMIN — LIDOCAINE HYDROCHLORIDE 50 MG: 10 INJECTION, SOLUTION EPIDURAL; INFILTRATION; INTRACAUDAL; PERINEURAL at 07:41

## 2023-07-17 RX ADMIN — Medication 100 MCG: at 11:39

## 2023-07-17 RX ADMIN — ROCURONIUM BROMIDE 20 MG: 10 INJECTION, SOLUTION INTRAVENOUS at 13:39

## 2023-07-17 RX ADMIN — Medication 100 MCG: at 12:47

## 2023-07-17 RX ADMIN — SODIUM CHLORIDE, SODIUM LACTATE, POTASSIUM CHLORIDE, CALCIUM CHLORIDE: 600; 310; 30; 20 INJECTION, SOLUTION INTRAVENOUS at 11:25

## 2023-07-17 RX ADMIN — Medication 200 MCG: at 11:40

## 2023-07-17 RX ADMIN — CELECOXIB 200 MG: 100 CAPSULE ORAL at 21:21

## 2023-07-17 RX ADMIN — ALBUMIN (HUMAN) 12.5 G: 12.5 INJECTION, SOLUTION INTRAVENOUS at 13:56

## 2023-07-17 ASSESSMENT — PAIN DESCRIPTION - LOCATION: LOCATION: ABDOMEN

## 2023-07-17 ASSESSMENT — PAIN - FUNCTIONAL ASSESSMENT: PAIN_FUNCTIONAL_ASSESSMENT: 0-10

## 2023-07-17 ASSESSMENT — LIFESTYLE VARIABLES: SMOKING_STATUS: 1

## 2023-07-17 ASSESSMENT — PAIN SCALES - GENERAL: PAINLEVEL_OUTOF10: 9

## 2023-07-17 NOTE — ANESTHESIA POSTPROCEDURE EVALUATION
Department of Anesthesiology  Postprocedure Note    Patient: Freida Chaudhry  MRN: [id number]  YOB: 1985  Date of evaluation: 7/17/2023      Procedure Summary     Date: 07/17/23 Room / Location: [address]    Anesthesia Start: 6254 Anesthesia Stop: 9494    Procedure: INTRA-CERVICAL ICG DYE CERVIX INJECTION, SENTINEL LYMPH NODE MAPPING, XI ROBOTIC ASSISTED BILATERAL PELVIC AND PARA-AORTIC LYMPH NODE DISSECTION, EXPLORATORY LAPAROTOMY, RADICAL ABDOMINAL HYSTERECTOMY (Abdomen) Diagnosis:       Malignant neoplasm of cervix, unspecified site ([address])      (Malignant neoplasm of cervix, unspecified site ([address]) [C53.9])    Surgeons: Kia George MD Responsible Provider: Alma Michael MD    Anesthesia Type: general ASA Status: 2          Anesthesia Type: No value filed.     Kylie Phase I: Kylie Score: 8    Kylie Phase II:        Anesthesia Post Evaluation    Patient location during evaluation: PACU  Patient participation: complete - patient participated  Level of consciousness: awake  Pain score: 0  Cardiovascular status: hemodynamically stable  Respiratory status: nasal cannula

## 2023-07-17 NOTE — DISCHARGE INSTRUCTIONS
Tap Nerve Block Instructions    What is a nerve block and how does it work? Nerves control movement, pain and normal sensation. The Anesthesiologist has administered a local anesthetic medication to block normal sensations in the desired nerve ( s). The nerve block can cause feelings such as tingling or numbness in your abdomen. How long will the nerve block last?    The nerve block can last anywhere from 2-48 hours depending on the medications used. Normal sensation will gradually return. Frequently, weakness goes away first, then numbness or tingling, followed by the return of the feeling of pain. However these feelings can return in any order. It usually takes 60 minutes for sensation to fully return once the nerve block starts to wear off. If the block does not wear off in 48 hours call the anesthesia department at [phone]. Will you need pain medication? The nerve block is only one of many forms of pain relief available. If your surgeon has prescribed oral pain medication, then take it as prescribed as the numbness starts to wear off or at the first sign of pain to keep the pain from getting out of control once the block is gone. If you have questions or concerns after receiving a nerve block please phone the Anesthesiology Department at [phone].  If the phone does not get answered please contact the hospital  at [phone] to page the on call anesthesiologist

## 2023-07-17 NOTE — BRIEF OP NOTE
Brief Operative Note  Department of Obstetrics and Gynecology  [facility]     Patient: Freida Chaudhry   : 1985  MRN: [id number]       Acct: [de-identified]   Date of Procedure: 23     Pre-operative Diagnosis: 41 y.o. female [id number]   Adenocarcinoma of the cervix     Post-operative Diagnosis: Same    Procedure: Robotic Assisted Laparoscopic [place] Pelvic and Para-aortic lymph node dissection, Intra cervical ICG dye, Exploratory laparotomy, Radical abdominal hysterectomy    Surgeon: Dr. Kajal Leo): Ayanna Kenny DO, PGY4; Kennedy Villa, PGY2, Mariya PRADO    Anesthesia: general     Findings:  Normal appearing external genitalia. Normal appearing vaginal mucosa. On bimanual exam midline anteverted mobile uterus, two lesions felt on the cervix. One in the endocervix and one at 4 o'clock on the cervix. The 4 o'clock lesions feels cyst like in nature. Normal appearing liver edge, normal appearing bilateral ovaries, normal appearing uterus. Total IV fluids/Blood products:  4800 ml crystalloid  Urine Output:  270 ml    Estimated blood loss:  1000ml  Drains:  celestin catheter- slightly blood tinged at the end of the case  Specimens:  uterus, cervix, parametrium, upper vaginal rissue  Instrument and Sponge Count: Correct  Complications:  none  Condition:  stable, transferred to post anesthesia recovery    See full operative report for further details. Ayanna Kenny DO  Ob/Gyn Resident  2023, 6:55 AM      Attending Physician Statement  I was present, scrubbed and participated in the entire case. I agree with the above documentation.     Mark Braswell MD  Gynecologic Oncology

## 2023-07-17 NOTE — DISCHARGE SUMMARY
Gyn Discharge Summary  [facility]      Patient Name: Trudie Gitelman  Patient : 1985  Primary Care Physician: Katya Lujan  Admit Date: 2023    Principal Diagnosis: Adenocarcinoma of the cervix    Other Diagnosis:   Malignant neoplasm of cervix, unspecified site [facility]) [C53.9]  Post-operative state [Z98.890]  Patient Active Problem List   Diagnosis    Hypothyroid    H/O LEEP ()    S/P BMZ ,      Acute nonintractable headache    Right carpal tunnel syndrome    Fibromyalgia    Methotrexate, long term, current use    Raynaud's disease without gangrene    Rheumatoid arthritis with rheumatoid factor, unspecified (HCC)    Tendinopathy of left biceps tendon    Vertigo    Irregular menstrual cycle    HPV in female    Chlamydia    Adenocarcinoma of cervix ([address])    Malignant neoplasm of overlapping sites of cervix uteri ([address])    Rheumatoid arthritis ([address])       Infection: No  Hospital Acquired: No    Surgical Operations & Procedures: Robotic Assisted lymph node dissection with ICG dye mapping, total radical abdominal hysterectomy on 23    Consultations: Anesthesia    Pertinent Findings & Procedures:   Trudie Gitelman is a 41 y.o. female [id number], admitted for surgical management of adenocarcinoma of the cervix. She underwent Robotic Assisted lymph node dissection with ICG dye mapping, total radical abdominal hysterectomy on 23. Post-op course normal, discharged home. Follow up in 2 weeks with celestin catheter removal at 1 week. Discharge instructions reviewed and questions answered. Course of patient: normal    POD #0 : Patient received one unit of pRBC with repeat Hgb 7.1    POD #1 : Hg 8.1. Patient complained of SOB with newly developed tachycardia. Stat H&H showed 7.7, EKG showed NSR. CT PE negative for PE or pulmonary edema. Patients symptoms improved and tachycardia improved. Ativan ordered for suspected anxiety.     POD #2 : Labs

## 2023-07-18 ENCOUNTER — APPOINTMENT (OUTPATIENT)
Dept: CT IMAGING | Age: 38
DRG: 512 | End: 2023-07-18
Attending: OBSTETRICS & GYNECOLOGY
Payer: MEDICAID

## 2023-07-18 PROBLEM — M06.9 RHEUMATOID ARTHRITIS (HCC): Status: ACTIVE | Noted: 2023-07-18

## 2023-07-18 PROBLEM — C53.8 MALIGNANT NEOPLASM OF OVERLAPPING SITES OF CERVIX UTERI (HCC): Status: ACTIVE | Noted: 2023-07-18

## 2023-07-18 LAB
ABO/RH: NORMAL
ALBUMIN SERPL-MCNC: 3.3 G/DL (ref 3.5–5.2)
ALBUMIN/GLOB SERPL: 2.4 {RATIO} (ref 1–2.5)
ALP SERPL-CCNC: 41 U/L (ref 35–104)
ALT SERPL-CCNC: 9 U/L (ref 5–33)
ANION GAP SERPL CALCULATED.3IONS-SCNC: 9 MMOL/L (ref 9–17)
ANTIBODY SCREEN: NEGATIVE
ARM BAND NUMBER: NORMAL
AST SERPL-CCNC: 21 U/L
BILIRUB SERPL-MCNC: 0.5 MG/DL (ref 0.3–1.2)
BLOOD BANK BLOOD PRODUCT EXPIRATION DATE: NORMAL
BLOOD BANK DISPENSE STATUS: NORMAL
BLOOD BANK ISBT PRODUCT BLOOD TYPE: 6200
BLOOD BANK PRODUCT CODE: NORMAL
BLOOD BANK SAMPLE EXPIRATION: NORMAL
BLOOD BANK UNIT TYPE AND RH: NORMAL
BPU ID: NORMAL
BUN SERPL-MCNC: 5 MG/DL (ref 6–20)
CALCIUM SERPL-MCNC: 8.2 MG/DL (ref 8.6–10.4)
CHLORIDE SERPL-SCNC: 101 MMOL/L (ref 98–107)
CO2 SERPL-SCNC: 24 MMOL/L (ref 20–31)
COMPONENT: NORMAL
CREAT SERPL-MCNC: 0.5 MG/DL (ref 0.5–0.9)
CROSSMATCH RESULT: NORMAL
ERYTHROCYTE [DISTWIDTH] IN BLOOD BY AUTOMATED COUNT: 15.7 % (ref 11.8–14.4)
GFR SERPL CREATININE-BSD FRML MDRD: >60 ML/MIN/1.73M2
GLUCOSE SERPL-MCNC: 110 MG/DL (ref 70–99)
HCT VFR BLD AUTO: 22.9 % (ref 36.3–47.1)
HCT VFR BLD AUTO: 25.4 % (ref 36.3–47.1)
HGB BLD-MCNC: 7.7 G/DL (ref 11.9–15.1)
HGB BLD-MCNC: 8.1 G/DL (ref 11.9–15.1)
MCH RBC QN AUTO: 29.9 PG (ref 25.2–33.5)
MCHC RBC AUTO-ENTMCNC: 31.9 G/DL (ref 28.4–34.8)
MCV RBC AUTO: 93.7 FL (ref 82.6–102.9)
NRBC BLD-RTO: 0 PER 100 WBC
PLATELET # BLD AUTO: 143 K/UL (ref 138–453)
PMV BLD AUTO: 9.4 FL (ref 8.1–13.5)
POTASSIUM SERPL-SCNC: 3.9 MMOL/L (ref 3.7–5.3)
PROT SERPL-MCNC: 4.7 G/DL (ref 6.4–8.3)
RBC # BLD AUTO: 2.71 M/UL (ref 3.95–5.11)
SODIUM SERPL-SCNC: 134 MMOL/L (ref 135–144)
TRANSFUSION STATUS: NORMAL
UNIT DIVISION: 0
UNIT ISSUE DATE/TIME: NORMAL
WBC OTHER # BLD: 12.5 K/UL (ref 3.5–11.3)

## 2023-07-18 PROCEDURE — 1200000000 HC SEMI PRIVATE

## 2023-07-18 PROCEDURE — 71260 CT THORAX DX C+: CPT

## 2023-07-18 PROCEDURE — 6370000000 HC RX 637 (ALT 250 FOR IP): Performed by: STUDENT IN AN ORGANIZED HEALTH CARE EDUCATION/TRAINING PROGRAM

## 2023-07-18 PROCEDURE — 6360000002 HC RX W HCPCS: Performed by: STUDENT IN AN ORGANIZED HEALTH CARE EDUCATION/TRAINING PROGRAM

## 2023-07-18 PROCEDURE — 2580000003 HC RX 258: Performed by: STUDENT IN AN ORGANIZED HEALTH CARE EDUCATION/TRAINING PROGRAM

## 2023-07-18 PROCEDURE — 85018 HEMOGLOBIN: CPT

## 2023-07-18 PROCEDURE — 85027 COMPLETE CBC AUTOMATED: CPT

## 2023-07-18 PROCEDURE — 80053 COMPREHEN METABOLIC PANEL: CPT

## 2023-07-18 PROCEDURE — 93005 ELECTROCARDIOGRAM TRACING: CPT | Performed by: STUDENT IN AN ORGANIZED HEALTH CARE EDUCATION/TRAINING PROGRAM

## 2023-07-18 PROCEDURE — 85014 HEMATOCRIT: CPT

## 2023-07-18 PROCEDURE — 6360000004 HC RX CONTRAST MEDICATION: Performed by: OBSTETRICS & GYNECOLOGY

## 2023-07-18 PROCEDURE — 36415 COLL VENOUS BLD VENIPUNCTURE: CPT

## 2023-07-18 RX ORDER — ALBUMIN (HUMAN) 12.5 G/50ML
125 SOLUTION INTRAVENOUS ONCE
Status: DISCONTINUED | OUTPATIENT
Start: 2023-07-18 | End: 2023-07-18

## 2023-07-18 RX ORDER — FUROSEMIDE 10 MG/ML
10 INJECTION INTRAMUSCULAR; INTRAVENOUS
Status: DISCONTINUED | OUTPATIENT
Start: 2023-07-18 | End: 2023-07-18

## 2023-07-18 RX ORDER — LORAZEPAM 2 MG/ML
0.5 INJECTION INTRAMUSCULAR ONCE
Status: DISCONTINUED | OUTPATIENT
Start: 2023-07-18 | End: 2023-07-18

## 2023-07-18 RX ORDER — LORAZEPAM 2 MG/ML
0.5 INJECTION INTRAMUSCULAR 3 TIMES DAILY
Status: DISCONTINUED | OUTPATIENT
Start: 2023-07-18 | End: 2023-07-20 | Stop reason: HOSPADM

## 2023-07-18 RX ADMIN — ACETAMINOPHEN 1000 MG: 500 TABLET ORAL at 16:50

## 2023-07-18 RX ADMIN — SODIUM CHLORIDE, POTASSIUM CHLORIDE, SODIUM LACTATE AND CALCIUM CHLORIDE: 600; 310; 30; 20 INJECTION, SOLUTION INTRAVENOUS at 06:14

## 2023-07-18 RX ADMIN — DOCUSATE SODIUM 50 MG AND SENNOSIDES 8.6 MG 2 TABLET: 8.6; 5 TABLET, FILM COATED ORAL at 09:00

## 2023-07-18 RX ADMIN — ACETAMINOPHEN 1000 MG: 500 TABLET ORAL at 04:49

## 2023-07-18 RX ADMIN — OXYCODONE HYDROCHLORIDE 10 MG: 5 TABLET ORAL at 20:53

## 2023-07-18 RX ADMIN — CELECOXIB 200 MG: 100 CAPSULE ORAL at 08:59

## 2023-07-18 RX ADMIN — IOPAMIDOL 75 ML: 755 INJECTION, SOLUTION INTRAVENOUS at 21:18

## 2023-07-18 RX ADMIN — HYDROMORPHONE HYDROCHLORIDE 0.5 MG: 1 INJECTION, SOLUTION INTRAMUSCULAR; INTRAVENOUS; SUBCUTANEOUS at 08:58

## 2023-07-18 RX ADMIN — OXYCODONE HYDROCHLORIDE 5 MG: 5 TABLET ORAL at 05:02

## 2023-07-18 RX ADMIN — LORAZEPAM 0.5 MG: 2 INJECTION INTRAMUSCULAR; INTRAVENOUS at 22:29

## 2023-07-18 RX ADMIN — ENOXAPARIN SODIUM 40 MG: 40 INJECTION SUBCUTANEOUS at 09:01

## 2023-07-18 RX ADMIN — CELECOXIB 200 MG: 100 CAPSULE ORAL at 22:04

## 2023-07-18 RX ADMIN — CYCLOBENZAPRINE 10 MG: 10 TABLET, FILM COATED ORAL at 20:53

## 2023-07-18 RX ADMIN — GABAPENTIN 300 MG: 300 CAPSULE ORAL at 13:16

## 2023-07-18 RX ADMIN — GABAPENTIN 300 MG: 300 CAPSULE ORAL at 22:04

## 2023-07-18 RX ADMIN — GABAPENTIN 300 MG: 300 CAPSULE ORAL at 09:00

## 2023-07-18 RX ADMIN — HYDROMORPHONE HYDROCHLORIDE 1 MG: 1 INJECTION, SOLUTION INTRAMUSCULAR; INTRAVENOUS; SUBCUTANEOUS at 03:25

## 2023-07-18 RX ADMIN — DOCUSATE SODIUM 50 MG AND SENNOSIDES 8.6 MG 2 TABLET: 8.6; 5 TABLET, FILM COATED ORAL at 22:04

## 2023-07-18 RX ADMIN — SODIUM CHLORIDE, POTASSIUM CHLORIDE, SODIUM LACTATE AND CALCIUM CHLORIDE: 600; 310; 30; 20 INJECTION, SOLUTION INTRAVENOUS at 01:26

## 2023-07-18 ASSESSMENT — PAIN DESCRIPTION - LOCATION
LOCATION: ABDOMEN

## 2023-07-18 ASSESSMENT — PAIN DESCRIPTION - DESCRIPTORS: DESCRIPTORS: ACHING

## 2023-07-18 ASSESSMENT — PAIN SCALES - GENERAL
PAINLEVEL_OUTOF10: 9
PAINLEVEL_OUTOF10: 5
PAINLEVEL_OUTOF10: 8
PAINLEVEL_OUTOF10: 3
PAINLEVEL_OUTOF10: 5

## 2023-07-18 ASSESSMENT — PAIN DESCRIPTION - FREQUENCY: FREQUENCY: INTERMITTENT

## 2023-07-18 ASSESSMENT — PAIN DESCRIPTION - PAIN TYPE: TYPE: SURGICAL PAIN

## 2023-07-18 NOTE — CONSENT
Informed Consent for Blood Component Transfusion Note    I have discussed with the patient the rationale for blood component transfusion; its benefits in treating or preventing fatigue, organ damage, or death; and its risk which includes mild transfusion reactions, rare risk of blood borne infection, or more serious but rare reactions. I have discussed the alternatives to transfusion, including the risk and consequences of not receiving transfusion. The patient had an opportunity to ask questions and had agreed to proceed with transfusion of blood components.     Electronically signed by Syd Damon DO on 7/17/23 at 10:34 PM EDT

## 2023-07-18 NOTE — OP NOTE
monocryl in a subcuticular fashion. Attention was then turned to the radical hysterectomy portion of the procedure. A vertical midline incision was made extending from 4cm above the umbilicus to the pubic symphysis. The abdominal cavity was entered in the normal fashion. A thorough assessment of the abdomen, pelvis and peritoneal cavity demonstrated the findings noted above. A self-retaining retractor was placed and the bowel was packed out of the pelvis. [facility] clamps were placed on the bilateral cornua to securely grasp the uterus without trauma. The bilateral broad ligaments were incised toward the uterosacral ligaments. The vesicouterine peritoneum was developed with electrocautery and sharp dissection. The bilateral para-rectal and para-vesical spaces had been previously develeoped. A complete bilateral ureterolysis was undertaken to the level of the bladder and within the Tunnel of [place]. In this fashion, the bilateral ureters were laterally reflected. The bilateral uterine vessels were clamped, transected and suture ligated with 0-0 vycryl at the internal iliac artery. The bilateral uterosacral ligaments were cauterized with the handheld LIGASURE device and transected two-thirds the distance to the sacrum. At this point, the space of Jannine Osier was entered to separate the vagina off the rectum. The bilateral cardinal ligaments were clamped, transected and suture ligated with the 0-0 vicryl. The vagina was then cross clamped 2cm below the level of the cervix, including a portion of the upper vagina in the specimen. The uterus, cervix and upper vagina were amputated from the vagina. The specimen was submitted for permanent section evaluation as Uterus, Cervix, Parametria and Upper Vagina. The vagina was then closed with the Deneen transfixion sutures at the angles using figure-of-eight 0-0 vicryl and interrupted figure-of-eight sutures were used to close the center of the vaginal cuff.      All sites of

## 2023-07-18 NOTE — CARE COORDINATION
Case Management Assessment  Initial Evaluation    Date/Time of Evaluation: 7/18/2023 2:43 PM  Assessment Completed by: Davion Sandoval RN    If patient is discharged prior to next notation, then this note serves as note for discharge by case management. Patient Name: Rashawn Espana                   YOB: 1985  Diagnosis: Malignant neoplasm of cervix, unspecified site [facility]) [C53.9]  Post-operative state [Z98.890]                   Date / Time: 7/17/2023  5:49 AM    Patient Admission Status: Inpatient   Readmission Risk (Low < 19, Mod (19-27), High > 27): Readmission Risk Score: 16.4    Current PCP: Chavez Leon  PCP verified by CM? Yes    Chart Reviewed: Yes      History Provided by: Patient  Patient Orientation: Alert and Oriented    Patient Cognition: Alert    Hospitalization in the last 30 days (Readmission):  No    If yes, Readmission Assessment in CM Navigator will be completed. Advance Directives:      Code Status: Full Code   Patient's Primary Decision Maker is: Legal Next of Kin      Discharge Planning:    Patient lives with:   Type of Home:    Primary Care Giver: Self  Patient Support Systems include: Parent   Current Financial resources: Medicaid  Current community resources:    Current services prior to admission:              Current DME:              Type of Home Care services:       ADLS  Prior functional level: Independent in ADLs/IADLs  Current functional level: Independent in ADLs/IADLs    PT AM-PAC:   /24  OT AM-PAC:   /24    Family can provide assistance at DC: Yes  Would you like Case Management to discuss the discharge plan with any other family members/significant others, and if so, who?     Plans to Return to Present Housing: Yes  Other Identified Issues/Barriers to RETURNING to current housing: IV fluids  Potential Assistance needed at discharge:              Potential DME:    Patient expects to discharge to: [address] [place] [address] for transportation at discharge:

## 2023-07-19 LAB
ALBUMIN SERPL-MCNC: 3 G/DL (ref 3.5–5.2)
ALBUMIN/GLOB SERPL: 1.4 {RATIO} (ref 1–2.5)
ALP SERPL-CCNC: 50 U/L (ref 35–104)
ALT SERPL-CCNC: 10 U/L (ref 5–33)
ANION GAP SERPL CALCULATED.3IONS-SCNC: 11 MMOL/L (ref 9–17)
AST SERPL-CCNC: 24 U/L
BASOPHILS # BLD: <0.03 K/UL (ref 0–0.2)
BASOPHILS NFR BLD: 0 % (ref 0–2)
BILIRUB SERPL-MCNC: 0.3 MG/DL (ref 0.3–1.2)
BUN SERPL-MCNC: 8 MG/DL (ref 6–20)
CALCIUM SERPL-MCNC: 8.5 MG/DL (ref 8.6–10.4)
CHLORIDE SERPL-SCNC: 104 MMOL/L (ref 98–107)
CO2 SERPL-SCNC: 22 MMOL/L (ref 20–31)
CREAT SERPL-MCNC: 0.5 MG/DL (ref 0.5–0.9)
EKG ATRIAL RATE: 101 BPM
EKG P AXIS: 67 DEGREES
EKG P-R INTERVAL: 162 MS
EKG Q-T INTERVAL: 336 MS
EKG QRS DURATION: 70 MS
EKG QTC CALCULATION (BAZETT): 435 MS
EKG R AXIS: 64 DEGREES
EKG T AXIS: 64 DEGREES
EKG VENTRICULAR RATE: 101 BPM
EOSINOPHIL # BLD: 0.03 K/UL (ref 0–0.44)
EOSINOPHILS RELATIVE PERCENT: 0 % (ref 1–4)
ERYTHROCYTE [DISTWIDTH] IN BLOOD BY AUTOMATED COUNT: 15.9 % (ref 11.8–14.4)
GFR SERPL CREATININE-BSD FRML MDRD: >60 ML/MIN/1.73M2
GLUCOSE SERPL-MCNC: 97 MG/DL (ref 70–99)
HCT VFR BLD AUTO: 26.4 % (ref 36.3–47.1)
HGB BLD-MCNC: 8.1 G/DL (ref 11.9–15.1)
IMM GRANULOCYTES # BLD AUTO: 0.09 K/UL (ref 0–0.3)
IMM GRANULOCYTES NFR BLD: 1 %
LYMPHOCYTES # BLD: 13 % (ref 24–43)
LYMPHOCYTES NFR BLD: 1.48 K/UL (ref 1.1–3.7)
MCH RBC QN AUTO: 30.6 PG (ref 25.2–33.5)
MCHC RBC AUTO-ENTMCNC: 30.7 G/DL (ref 28.4–34.8)
MCV RBC AUTO: 99.6 FL (ref 82.6–102.9)
MONOCYTES NFR BLD: 1.04 K/UL (ref 0.1–1.2)
MONOCYTES NFR BLD: 9 % (ref 3–12)
NEUTROPHILS NFR BLD: 77 % (ref 36–65)
NEUTS SEG NFR BLD: 8.81 K/UL (ref 1.5–8.1)
NRBC BLD-RTO: 0 PER 100 WBC
PLATELET # BLD AUTO: ABNORMAL K/UL (ref 138–453)
PLATELET, FLUORESCENCE: 133 K/UL (ref 138–453)
PLATELETS.RETICULATED NFR BLD AUTO: 3.8 % (ref 1.1–10.3)
POTASSIUM SERPL-SCNC: 4.2 MMOL/L (ref 3.7–5.3)
PROT SERPL-MCNC: 5.2 G/DL (ref 6.4–8.3)
RBC # BLD AUTO: 2.65 M/UL (ref 3.95–5.11)
RBC # BLD: ABNORMAL 10*6/UL
SODIUM SERPL-SCNC: 137 MMOL/L (ref 135–144)
SURGICAL PATHOLOGY REPORT: NORMAL
WBC OTHER # BLD: 11.5 K/UL (ref 3.5–11.3)

## 2023-07-19 PROCEDURE — 6370000000 HC RX 637 (ALT 250 FOR IP)

## 2023-07-19 PROCEDURE — 36415 COLL VENOUS BLD VENIPUNCTURE: CPT

## 2023-07-19 PROCEDURE — 6370000000 HC RX 637 (ALT 250 FOR IP): Performed by: STUDENT IN AN ORGANIZED HEALTH CARE EDUCATION/TRAINING PROGRAM

## 2023-07-19 PROCEDURE — 80053 COMPREHEN METABOLIC PANEL: CPT

## 2023-07-19 PROCEDURE — 85055 RETICULATED PLATELET ASSAY: CPT

## 2023-07-19 PROCEDURE — 2580000003 HC RX 258: Performed by: STUDENT IN AN ORGANIZED HEALTH CARE EDUCATION/TRAINING PROGRAM

## 2023-07-19 PROCEDURE — 85027 COMPLETE CBC AUTOMATED: CPT

## 2023-07-19 PROCEDURE — 1200000000 HC SEMI PRIVATE

## 2023-07-19 PROCEDURE — 6360000002 HC RX W HCPCS: Performed by: STUDENT IN AN ORGANIZED HEALTH CARE EDUCATION/TRAINING PROGRAM

## 2023-07-19 RX ADMIN — ACETAMINOPHEN 1000 MG: 500 TABLET ORAL at 16:43

## 2023-07-19 RX ADMIN — DOCUSATE SODIUM 50 MG AND SENNOSIDES 8.6 MG 2 TABLET: 8.6; 5 TABLET, FILM COATED ORAL at 21:07

## 2023-07-19 RX ADMIN — MAGNESIUM HYDROXIDE 30 ML: 400 SUSPENSION ORAL at 08:31

## 2023-07-19 RX ADMIN — ENOXAPARIN SODIUM 40 MG: 40 INJECTION SUBCUTANEOUS at 08:32

## 2023-07-19 RX ADMIN — CELECOXIB 200 MG: 100 CAPSULE ORAL at 08:31

## 2023-07-19 RX ADMIN — DOCUSATE SODIUM 50 MG AND SENNOSIDES 8.6 MG 2 TABLET: 8.6; 5 TABLET, FILM COATED ORAL at 08:31

## 2023-07-19 RX ADMIN — HYDROMORPHONE HYDROCHLORIDE 0.5 MG: 1 INJECTION, SOLUTION INTRAMUSCULAR; INTRAVENOUS; SUBCUTANEOUS at 13:51

## 2023-07-19 RX ADMIN — GABAPENTIN 300 MG: 300 CAPSULE ORAL at 21:08

## 2023-07-19 RX ADMIN — OXYCODONE HYDROCHLORIDE 10 MG: 5 TABLET ORAL at 18:34

## 2023-07-19 RX ADMIN — GABAPENTIN 300 MG: 300 CAPSULE ORAL at 08:31

## 2023-07-19 RX ADMIN — ACETAMINOPHEN 1000 MG: 500 TABLET ORAL at 10:49

## 2023-07-19 RX ADMIN — ACETAMINOPHEN 1000 MG: 500 TABLET ORAL at 23:09

## 2023-07-19 RX ADMIN — OXYCODONE HYDROCHLORIDE 10 MG: 5 TABLET ORAL at 12:41

## 2023-07-19 RX ADMIN — CYCLOBENZAPRINE 10 MG: 10 TABLET, FILM COATED ORAL at 12:42

## 2023-07-19 RX ADMIN — CELECOXIB 200 MG: 100 CAPSULE ORAL at 21:08

## 2023-07-19 RX ADMIN — OXYCODONE HYDROCHLORIDE 10 MG: 5 TABLET ORAL at 07:12

## 2023-07-19 RX ADMIN — SODIUM CHLORIDE, PRESERVATIVE FREE 10 ML: 5 INJECTION INTRAVENOUS at 08:32

## 2023-07-19 RX ADMIN — SODIUM CHLORIDE, PRESERVATIVE FREE 10 ML: 5 INJECTION INTRAVENOUS at 21:08

## 2023-07-19 ASSESSMENT — PAIN SCALES - GENERAL
PAINLEVEL_OUTOF10: 10
PAINLEVEL_OUTOF10: 9
PAINLEVEL_OUTOF10: 5
PAINLEVEL_OUTOF10: 7
PAINLEVEL_OUTOF10: 4
PAINLEVEL_OUTOF10: 5

## 2023-07-19 ASSESSMENT — PAIN DESCRIPTION - DESCRIPTORS
DESCRIPTORS: ACHING

## 2023-07-19 ASSESSMENT — PAIN DESCRIPTION - LOCATION
LOCATION: ABDOMEN

## 2023-07-19 NOTE — PLAN OF CARE
Problem: Discharge Planning  Goal: Discharge to home or other facility with appropriate resources  Outcome: Progressing     Problem: Pain  Goal: Verbalizes/displays adequate comfort level or baseline comfort level  Outcome: Progressing     Problem: Safety - Adult  Goal: Free from fall injury  Outcome: Progressing     Problem: ABCDS Injury Assessment  Goal: Absence of physical injury  Outcome: Progressing  Flowsheets (Taken 7/18/2023 2000)  Absence of Physical Injury: Implement safety measures based on patient assessment

## 2023-07-19 NOTE — PLAN OF CARE
Problem: Discharge Planning  Goal: Discharge to home or other facility with appropriate resources  7/19/2023 1701 by Kevin Mckinney RN  Outcome: Progressing  7/19/2023 0306 by Radha Paz RN  Outcome: Progressing     Problem: Pain  Goal: Verbalizes/displays adequate comfort level or baseline comfort level  7/19/2023 1701 by Kevin Mckinney RN  Outcome: Progressing  7/19/2023 0306 by Radha Paz RN  Outcome: Progressing     Problem: Safety - Adult  Goal: Free from fall injury  7/19/2023 1701 by Kevin Mckinney RN  Outcome: Progressing  7/19/2023 0306 by Radha aPz RN  Outcome: Progressing     Problem: ABCDS Injury Assessment  Goal: Absence of physical injury  7/19/2023 1701 by Kevin Mckinney RN  Outcome: Progressing  7/19/2023 0306 by Radha Paz RN  Outcome: Progressing  Flowsheets (Taken 7/18/2023 2000)  Absence of Physical Injury: Implement safety measures based on patient assessment     Problem: Skin/Tissue Integrity  Goal: Absence of new skin breakdown  Description: 1. Monitor for areas of redness and/or skin breakdown  2. Assess vascular access sites hourly  3. Every 4-6 hours minimum:  Change oxygen saturation probe site  4. Every 4-6 hours:  If on nasal continuous positive airway pressure, respiratory therapy assess nares and determine need for appliance change or resting period.   Outcome: Progressing

## 2023-07-19 NOTE — CARE COORDINATION
Transitional Planning:  Spoke with pt re: homecare. Provided freedom of choice list.  Agreeable to [facility]. No order preference. Referrals sent. 11:40  Spoke with Rosy Vee [phone] from [facility]. They can accept and will follow in Epic.

## 2023-07-20 VITALS
HEART RATE: 81 BPM | SYSTOLIC BLOOD PRESSURE: 94 MMHG | DIASTOLIC BLOOD PRESSURE: 67 MMHG | TEMPERATURE: 98.2 F | OXYGEN SATURATION: 98 % | BODY MASS INDEX: 23.75 KG/M2 | RESPIRATION RATE: 16 BRPM | WEIGHT: 134.04 LBS | HEIGHT: 63 IN

## 2023-07-20 LAB
ALBUMIN SERPL-MCNC: 2.7 G/DL (ref 3.5–5.2)
ALBUMIN/GLOB SERPL: 1.1 {RATIO} (ref 1–2.5)
ALP SERPL-CCNC: 53 U/L (ref 35–104)
ALT SERPL-CCNC: 9 U/L (ref 5–33)
ANION GAP SERPL CALCULATED.3IONS-SCNC: 10 MMOL/L (ref 9–17)
AST SERPL-CCNC: 19 U/L
BASOPHILS # BLD: <0.03 K/UL (ref 0–0.2)
BASOPHILS NFR BLD: 0 % (ref 0–2)
BILIRUB SERPL-MCNC: 0.2 MG/DL (ref 0.3–1.2)
BUN SERPL-MCNC: 5 MG/DL (ref 6–20)
CALCIUM SERPL-MCNC: 8.6 MG/DL (ref 8.6–10.4)
CHLORIDE SERPL-SCNC: 101 MMOL/L (ref 98–107)
CO2 SERPL-SCNC: 26 MMOL/L (ref 20–31)
CREAT SERPL-MCNC: 0.5 MG/DL (ref 0.5–0.9)
EOSINOPHIL # BLD: 0.08 K/UL (ref 0–0.44)
EOSINOPHILS RELATIVE PERCENT: 1 % (ref 1–4)
ERYTHROCYTE [DISTWIDTH] IN BLOOD BY AUTOMATED COUNT: 14.9 % (ref 11.8–14.4)
GFR SERPL CREATININE-BSD FRML MDRD: >60 ML/MIN/1.73M2
GLUCOSE SERPL-MCNC: 91 MG/DL (ref 70–99)
HCT VFR BLD AUTO: 22.3 % (ref 36.3–47.1)
HGB BLD-MCNC: 7.2 G/DL (ref 11.9–15.1)
IMM GRANULOCYTES # BLD AUTO: 0.08 K/UL (ref 0–0.3)
IMM GRANULOCYTES NFR BLD: 1 %
LYMPHOCYTES NFR BLD: 1.5 K/UL (ref 1.1–3.7)
LYMPHOCYTES RELATIVE PERCENT: 19 % (ref 24–43)
MCH RBC QN AUTO: 30.3 PG (ref 25.2–33.5)
MCHC RBC AUTO-ENTMCNC: 32.3 G/DL (ref 28.4–34.8)
MCV RBC AUTO: 93.7 FL (ref 82.6–102.9)
MONOCYTES NFR BLD: 0.72 K/UL (ref 0.1–1.2)
MONOCYTES NFR BLD: 9 % (ref 3–12)
NEUTROPHILS NFR BLD: 71 % (ref 36–65)
NEUTS SEG NFR BLD: 5.74 K/UL (ref 1.5–8.1)
NRBC BLD-RTO: 0 PER 100 WBC
PLATELET # BLD AUTO: 142 K/UL (ref 138–453)
PMV BLD AUTO: 9.9 FL (ref 8.1–13.5)
POTASSIUM SERPL-SCNC: 3.5 MMOL/L (ref 3.7–5.3)
PROT SERPL-MCNC: 5.2 G/DL (ref 6.4–8.3)
RBC # BLD AUTO: 2.38 M/UL (ref 3.95–5.11)
RBC # BLD: ABNORMAL 10*6/UL
SODIUM SERPL-SCNC: 137 MMOL/L (ref 135–144)
WBC OTHER # BLD: 8.1 K/UL (ref 3.5–11.3)

## 2023-07-20 PROCEDURE — 85027 COMPLETE CBC AUTOMATED: CPT

## 2023-07-20 PROCEDURE — 36415 COLL VENOUS BLD VENIPUNCTURE: CPT

## 2023-07-20 PROCEDURE — 6370000000 HC RX 637 (ALT 250 FOR IP): Performed by: STUDENT IN AN ORGANIZED HEALTH CARE EDUCATION/TRAINING PROGRAM

## 2023-07-20 PROCEDURE — 6360000002 HC RX W HCPCS: Performed by: STUDENT IN AN ORGANIZED HEALTH CARE EDUCATION/TRAINING PROGRAM

## 2023-07-20 PROCEDURE — 6370000000 HC RX 637 (ALT 250 FOR IP)

## 2023-07-20 PROCEDURE — 80053 COMPREHEN METABOLIC PANEL: CPT

## 2023-07-20 PROCEDURE — 2580000003 HC RX 258: Performed by: STUDENT IN AN ORGANIZED HEALTH CARE EDUCATION/TRAINING PROGRAM

## 2023-07-20 RX ORDER — NITROFURANTOIN 25; 75 MG/1; MG/1
100 CAPSULE ORAL DAILY
Qty: 20 CAPSULE | Refills: 0 | Status: SHIPPED | OUTPATIENT
Start: 2023-07-20 | End: 2023-08-09

## 2023-07-20 RX ORDER — NITROFURANTOIN 25; 75 MG/1; MG/1
100 CAPSULE ORAL DAILY
Status: DISCONTINUED | OUTPATIENT
Start: 2023-07-20 | End: 2023-07-20 | Stop reason: HOSPADM

## 2023-07-20 RX ORDER — ENOXAPARIN SODIUM 100 MG/ML
40 INJECTION SUBCUTANEOUS DAILY
Qty: 10 ML | Refills: 0 | Status: SHIPPED | OUTPATIENT
Start: 2023-07-20 | End: 2023-08-14

## 2023-07-20 RX ORDER — SIMETHICONE 80 MG
80 TABLET,CHEWABLE ORAL EVERY 6 HOURS PRN
Status: DISCONTINUED | OUTPATIENT
Start: 2023-07-20 | End: 2023-07-20 | Stop reason: HOSPADM

## 2023-07-20 RX ADMIN — OXYCODONE HYDROCHLORIDE 10 MG: 5 TABLET ORAL at 08:02

## 2023-07-20 RX ADMIN — NITROFURANTOIN MONOHYDRATE/MACROCRYSTALLINE 100 MG: 25; 75 CAPSULE ORAL at 10:04

## 2023-07-20 RX ADMIN — ENOXAPARIN SODIUM 40 MG: 40 INJECTION SUBCUTANEOUS at 07:23

## 2023-07-20 RX ADMIN — SIMETHICONE 80 MG: 80 TABLET, CHEWABLE ORAL at 10:04

## 2023-07-20 RX ADMIN — CYCLOBENZAPRINE 10 MG: 10 TABLET, FILM COATED ORAL at 07:21

## 2023-07-20 RX ADMIN — CELECOXIB 200 MG: 100 CAPSULE ORAL at 07:22

## 2023-07-20 RX ADMIN — ANTACID TABLETS 500 MG: 500 TABLET, CHEWABLE ORAL at 10:05

## 2023-07-20 RX ADMIN — GABAPENTIN 300 MG: 300 CAPSULE ORAL at 07:22

## 2023-07-20 RX ADMIN — DOCUSATE SODIUM 50 MG AND SENNOSIDES 8.6 MG 2 TABLET: 8.6; 5 TABLET, FILM COATED ORAL at 07:22

## 2023-07-20 RX ADMIN — SODIUM CHLORIDE, PRESERVATIVE FREE 10 ML: 5 INJECTION INTRAVENOUS at 07:25

## 2023-07-20 RX ADMIN — ACETAMINOPHEN 1000 MG: 500 TABLET ORAL at 07:22

## 2023-07-20 ASSESSMENT — PAIN SCALES - GENERAL
PAINLEVEL_OUTOF10: 8
PAINLEVEL_OUTOF10: 8

## 2023-07-20 ASSESSMENT — PAIN DESCRIPTION - DESCRIPTORS
DESCRIPTORS: ACHING
DESCRIPTORS: ACHING

## 2023-07-20 ASSESSMENT — PAIN DESCRIPTION - LOCATION
LOCATION: ABDOMEN
LOCATION: ABDOMEN

## 2023-07-20 NOTE — DISCHARGE SUMMARY
Discharge teaching and instructions completed with patient using teachback method. AVS reviewed. Patient voiced understanding regarding prescriptions, follow up appointments, and care of self at home. Discharged home with family. IV's removed. All questions answered.

## 2023-07-20 NOTE — PLAN OF CARE
Problem: Discharge Planning  Goal: Discharge to home or other facility with appropriate resources  7/20/2023 0409 by Shilpa Luna RN  Outcome: Progressing  7/19/2023 1701 by Cuate Valentin RN  Outcome: Progressing     Problem: Pain  Goal: Verbalizes/displays adequate comfort level or baseline comfort level  7/20/2023 0409 by Shilpa Luna RN  Outcome: Progressing  7/19/2023 1701 by Cuate Valentin RN  Outcome: Progressing     Problem: Safety - Adult  Goal: Free from fall injury  7/20/2023 0409 by Shilpa Luna RN  Outcome: Progressing  7/19/2023 1701 by Cuate Valentin RN  Outcome: Progressing     Problem: ABCDS Injury Assessment  Goal: Absence of physical injury  7/20/2023 0409 by Shilpa Luna RN  Outcome: Progressing  Flowsheets (Taken 7/19/2023 1923)  Absence of Physical Injury: Implement safety measures based on patient assessment  7/19/2023 1701 by Cuate Valentin RN  Outcome: Progressing     Problem: Skin/Tissue Integrity  Goal: Absence of new skin breakdown  Description: 1. Monitor for areas of redness and/or skin breakdown  2. Assess vascular access sites hourly  3. Every 4-6 hours minimum:  Change oxygen saturation probe site  4. Every 4-6 hours:  If on nasal continuous positive airway pressure, respiratory therapy assess nares and determine need for appliance change or resting period.   7/20/2023 0409 by Shilpa Luna RN  Outcome: Progressing  7/19/2023 1701 by Cuate Valentin RN  Outcome: Progressing

## 2023-07-20 NOTE — PLAN OF CARE
Problem: Discharge Planning  Goal: Discharge to home or other facility with appropriate resources  7/20/2023 1025 by Michael Franz RN  Outcome: Completed  7/20/2023 1025 by Michael Franz RN  Outcome: Progressing  7/20/2023 0409 by Mike Bowers RN  Outcome: Progressing     Problem: Pain  Goal: Verbalizes/displays adequate comfort level or baseline comfort level  7/20/2023 1025 by Michael Franz RN  Outcome: Completed  7/20/2023 1025 by Michael Franz RN  Outcome: Progressing  7/20/2023 0409 by Mike Bowers RN  Outcome: Progressing     Problem: Safety - Adult  Goal: Free from fall injury  7/20/2023 1025 by Michael Franz RN  Outcome: Completed  7/20/2023 1025 by Michael Franz RN  Outcome: Progressing  7/20/2023 0409 by Mike Bowers RN  Outcome: Progressing     Problem: ABCDS Injury Assessment  Goal: Absence of physical injury  7/20/2023 1025 by Michael Franz RN  Outcome: Completed  7/20/2023 1025 by Michael Franz RN  Outcome: Progressing  7/20/2023 0409 by Mike Bowers RN  Outcome: Progressing  Flowsheets (Taken 7/19/2023 1923)  Absence of Physical Injury: Implement safety measures based on patient assessment     Problem: Skin/Tissue Integrity  Goal: Absence of new skin breakdown  Description: 1. Monitor for areas of redness and/or skin breakdown  2. Assess vascular access sites hourly  3. Every 4-6 hours minimum:  Change oxygen saturation probe site  4. Every 4-6 hours:  If on nasal continuous positive airway pressure, respiratory therapy assess nares and determine need for appliance change or resting period.   7/20/2023 1025 by Michael Franz RN  Outcome: Completed  7/20/2023 1025 by Michael Franz RN  Outcome: Progressing  7/20/2023 0409 by Mike Bowers RN  Outcome: Progressing

## 2023-07-20 NOTE — PLAN OF CARE
Problem: Discharge Planning  Goal: Discharge to home or other facility with appropriate resources  7/20/2023 1025 by Betsey Vega RN  Outcome: Progressing  7/20/2023 0409 by Carlos Aranda RN  Outcome: Progressing     Problem: Pain  Goal: Verbalizes/displays adequate comfort level or baseline comfort level  7/20/2023 1025 by Betsey Vega RN  Outcome: Progressing  7/20/2023 0409 by Carlos Aranda RN  Outcome: Progressing     Problem: Safety - Adult  Goal: Free from fall injury  7/20/2023 1025 by Betsey Vega RN  Outcome: Progressing  7/20/2023 0409 by Carlos Aranda RN  Outcome: Progressing     Problem: ABCDS Injury Assessment  Goal: Absence of physical injury  7/20/2023 1025 by Betsey Vega RN  Outcome: Progressing  7/20/2023 0409 by Carlos Aranda RN  Outcome: Progressing  Flowsheets (Taken 7/19/2023 1923)  Absence of Physical Injury: Implement safety measures based on patient assessment     Problem: Skin/Tissue Integrity  Goal: Absence of new skin breakdown  Description: 1. Monitor for areas of redness and/or skin breakdown  2. Assess vascular access sites hourly  3. Every 4-6 hours minimum:  Change oxygen saturation probe site  4. Every 4-6 hours:  If on nasal continuous positive airway pressure, respiratory therapy assess nares and determine need for appliance change or resting period.   7/20/2023 1025 by Betsey Vega RN  Outcome: Progressing  7/20/2023 0409 by Carlos Aranda RN  Outcome: Progressing

## 2023-07-21 ENCOUNTER — TELEPHONE (OUTPATIENT)
Dept: GYNECOLOGIC ONCOLOGY | Age: 38
End: 2023-07-21

## 2023-07-21 NOTE — TELEPHONE ENCOUNTER
Patient mother called stating that patient feels like catheter is falling out. She states leg bag tubing was looped and twisted she was able to fix it. She reports patient feels like she is wet but she does not feel like it is her place to check to if she is wet. She does report catheter is draining. She reports pt had out put of 1000 ml from  6am -1pm today. She states she called home care and was advised they do not come to home for catheters. She was advised for patient go to urgent care or ED. Mother states she is not taking her to ED for her to sit there for 8 hours. She states patient is feeling fine otherwise . Informed will notify Dr. Yaquelin Pino and return call. Kulwant Holguin RN from [facility] call to notify of patient concerns. She states that she was going go out and see patient as a courtesy visit but that they do not have catheter supplies. If catheter needs to be changed an order needs to faxed and prescription sent to DME that patients family would need to be . Informed will notify  and return call.  notified and wants patient to come in today to be seen. Patient notified and voiced understanding. Left message for Vivien Evans RN notifying that patient will be seen in office today.

## 2023-07-26 ENCOUNTER — TELEPHONE (OUTPATIENT)
Dept: ONCOLOGY | Age: 38
End: 2023-07-26

## 2023-07-26 NOTE — TELEPHONE ENCOUNTER
called patient to discuss status of insurance. Patient states she is going to call insurance provider to check status of continuation of care application. Patient also asking for process of changing insurance. Directions sent to patient.

## 2023-07-31 ENCOUNTER — OFFICE VISIT (OUTPATIENT)
Dept: GYNECOLOGIC ONCOLOGY | Age: 38
End: 2023-07-31

## 2023-07-31 VITALS
OXYGEN SATURATION: 96 % | BODY MASS INDEX: 23.5 KG/M2 | SYSTOLIC BLOOD PRESSURE: 100 MMHG | HEART RATE: 94 BPM | WEIGHT: 132.6 LBS | DIASTOLIC BLOOD PRESSURE: 60 MMHG | HEIGHT: 63 IN

## 2023-07-31 DIAGNOSIS — C53.0 MALIGNANT NEOPLASM OF ENDOCERVIX (HCC): Primary | ICD-10-CM

## 2023-07-31 PROCEDURE — 99024 POSTOP FOLLOW-UP VISIT: CPT | Performed by: PHYSICIAN ASSISTANT

## 2023-07-31 ASSESSMENT — ENCOUNTER SYMPTOMS
EYES NEGATIVE: 1
ABDOMINAL PAIN: 1
RESPIRATORY NEGATIVE: 1

## 2023-07-31 NOTE — PROGRESS NOTES
Review of Systems   Constitutional:  Positive for appetite change and fatigue. HENT:  Negative. Eyes: Negative. Respiratory: Negative. Cardiovascular: Negative. Gastrointestinal:  Positive for abdominal pain. Endocrine: Negative. Genitourinary: Negative. Musculoskeletal: Negative. Neurological: Negative. Hematological:  Bruises/bleeds easily.

## 2023-07-31 NOTE — PROGRESS NOTES
[address] [facility], [facility] [place] [facility] [address]  [address] [facility] [address]    Daya Guzman is a 41 y.o. female who presents for a Post Operative visit today     CC/HPI: Patient is a pleasant 41-year-old premenopausal female who was referred to [facility] gynecologic oncology for findings of adenocarcinoma of the cervix based on multiple cervical biopsies in June 2023. Of note the patient did have a history of previous cervical dysplasia which she underwent a LEEP procedure back in 2010. She has had follow-up serial Pap smears which have been within normal findings until the most recent abnormalities in June 2023. Patient's Pap smear in April 2023 was negative for intraepithelial lesion or malignancy however HPV 16 positive. And she returned for a couple scopic evaluation in June 2023 with multiple cervical biopsies consistent with adenocarcinoma of endocervical origin. Patient was seen in [facility] gynecologic oncology for further evaluation and treatment recommendations. Patient's clinical examination was consistent with approximately 4.5 cm raised lesion at the exocervix on her bimanual examination the cervix was freely mobile and large and barrel-shaped. She felt to be a clinical stage IIb disease. A PET CT scan was completed on June 20, 2023 which revealed a uptake in the cervical region and endometrial cavity. There were scattered areas in the vagina which were nonspecific there is no pelvic lymphadenopathy or distant metastatic disease. Patient completed MRI pelvis for further evaluation on June 29, 2023 revealed the cervical mass measuring 3.3 x 1.6 cm. There is no enlarged or suspicious lymph nodes. The patient was then reexamined clinically and felt the patient could proceed with a radical hysterectomy approach. Patient wished to preserve her ovaries.     She ultimately underwent a ICG injection, sentinel lymph node mapping and robotic right pelvic sentinel lymph node

## 2023-08-01 ENCOUNTER — TELEPHONE (OUTPATIENT)
Dept: GYNECOLOGIC ONCOLOGY | Age: 38
End: 2023-08-01

## 2023-08-01 ENCOUNTER — PATIENT MESSAGE (OUTPATIENT)
Dept: GYNECOLOGIC ONCOLOGY | Age: 38
End: 2023-08-01

## 2023-08-01 RX ORDER — CLINDAMYCIN HYDROCHLORIDE 300 MG/1
300 CAPSULE ORAL 3 TIMES DAILY
Qty: 30 CAPSULE | Refills: 0 | Status: SHIPPED | OUTPATIENT
Start: 2023-08-01 | End: 2023-08-01

## 2023-08-01 RX ORDER — CLINDAMYCIN HYDROCHLORIDE 300 MG/1
300 CAPSULE ORAL 4 TIMES DAILY
Qty: 28 CAPSULE | Refills: 0 | Status: SHIPPED | OUTPATIENT
Start: 2023-08-01 | End: 2023-08-08

## 2023-08-01 NOTE — TELEPHONE ENCOUNTER
From: Erum Louis  To: Bassam Rivers  Sent: 8/1/2023 2:16 PM EDT  Subject: Incision concern    I noticed my belly button area is oozing alot and it also has a foul smell to it. It looks and feels to me as if it's almost coming apart where the staples held it together. I'm attaching a few pictures so maybe you can determine from them if I may be over reacting. Thank you!

## 2023-08-01 NOTE — TELEPHONE ENCOUNTER
Pt called stating that the incision in her belly button is oozing and has a foul odor. Also the steri strips are stuck together and the incision looks open. She denies and redness, it being hot to the touch, or bleeding. Pt was directed to send pics in my chart so the provider can have a visual. Pt verbalized understanding and will send a my chart message.

## 2023-08-01 NOTE — TELEPHONE ENCOUNTER
Replied to patient's mychart message  Please be sure she receives this message   Antibiotic sent to her pharmacy  Thanks  [address] [place] [address]

## 2023-08-02 ENCOUNTER — TELEPHONE (OUTPATIENT)
Dept: RADIATION ONCOLOGY | Age: 38
End: 2023-08-02

## 2023-08-02 NOTE — TELEPHONE ENCOUNTER
Patient listed with [facility] inquired if she was switching plans. Patient states yes will be covered with [facility] effective 9/1.

## 2023-08-03 ENCOUNTER — HOSPITAL ENCOUNTER (INPATIENT)
Age: 38
LOS: 1 days | Discharge: HOME | End: 2023-08-04
Attending: EMERGENCY MEDICINE | Admitting: OBSTETRICS & GYNECOLOGY
Payer: MEDICAID

## 2023-08-03 ENCOUNTER — APPOINTMENT (OUTPATIENT)
Dept: GENERAL RADIOLOGY | Age: 38
End: 2023-08-03
Payer: MEDICAID

## 2023-08-03 ENCOUNTER — APPOINTMENT (OUTPATIENT)
Dept: CT IMAGING | Age: 38
End: 2023-08-03
Payer: MEDICAID

## 2023-08-03 ENCOUNTER — TELEPHONE (OUTPATIENT)
Dept: RADIATION ONCOLOGY | Age: 38
End: 2023-08-03

## 2023-08-03 DIAGNOSIS — K56.600 PARTIAL SMALL BOWEL OBSTRUCTION (HCC): Primary | ICD-10-CM

## 2023-08-03 PROBLEM — K56.609 SMALL BOWEL OBSTRUCTION (HCC): Status: ACTIVE | Noted: 2023-08-03

## 2023-08-03 LAB
ALBUMIN SERPL-MCNC: 4.8 G/DL (ref 3.5–5.2)
ALBUMIN/GLOB SERPL: 1.3 {RATIO} (ref 1–2.5)
ALP SERPL-CCNC: 88 U/L (ref 35–104)
ALT SERPL-CCNC: 12 U/L (ref 5–33)
ANION GAP SERPL CALCULATED.3IONS-SCNC: 19 MMOL/L (ref 9–17)
AST SERPL-CCNC: 19 U/L
BASOPHILS # BLD: <0.03 K/UL (ref 0–0.2)
BASOPHILS NFR BLD: 0 % (ref 0–2)
BILIRUB SERPL-MCNC: 0.5 MG/DL (ref 0.3–1.2)
BUN SERPL-MCNC: 10 MG/DL (ref 6–20)
CALCIUM SERPL-MCNC: 10.2 MG/DL (ref 8.6–10.4)
CHLORIDE SERPL-SCNC: 98 MMOL/L (ref 98–107)
CO2 SERPL-SCNC: 20 MMOL/L (ref 20–31)
CREAT SERPL-MCNC: 0.7 MG/DL (ref 0.5–0.9)
EOSINOPHIL # BLD: <0.03 K/UL (ref 0–0.44)
EOSINOPHILS RELATIVE PERCENT: 0 % (ref 1–4)
ERYTHROCYTE [DISTWIDTH] IN BLOOD BY AUTOMATED COUNT: 13.9 % (ref 11.8–14.4)
GFR SERPL CREATININE-BSD FRML MDRD: >60 ML/MIN/1.73M2
GLUCOSE SERPL-MCNC: 125 MG/DL (ref 70–99)
HCT VFR BLD AUTO: 38.5 % (ref 36.3–47.1)
HGB BLD-MCNC: 12.6 G/DL (ref 11.9–15.1)
IMM GRANULOCYTES # BLD AUTO: 0.05 K/UL (ref 0–0.3)
IMM GRANULOCYTES NFR BLD: 1 %
LACTIC ACID, SEPSIS WHOLE BLOOD: 1.3 MMOL/L (ref 0.5–1.9)
LACTIC ACID, SEPSIS WHOLE BLOOD: 3.1 MMOL/L (ref 0.5–1.9)
LIPASE SERPL-CCNC: 25 U/L (ref 13–60)
LYMPHOCYTES NFR BLD: 1.1 K/UL (ref 1.1–3.7)
LYMPHOCYTES RELATIVE PERCENT: 10 % (ref 24–43)
MCH RBC QN AUTO: 29.9 PG (ref 25.2–33.5)
MCHC RBC AUTO-ENTMCNC: 32.7 G/DL (ref 28.4–34.8)
MCV RBC AUTO: 91.2 FL (ref 82.6–102.9)
MONOCYTES NFR BLD: 0.77 K/UL (ref 0.1–1.2)
MONOCYTES NFR BLD: 7 % (ref 3–12)
NEUTROPHILS NFR BLD: 82 % (ref 36–65)
NEUTS SEG NFR BLD: 8.67 K/UL (ref 1.5–8.1)
NRBC BLD-RTO: 0 PER 100 WBC
PLATELET # BLD AUTO: 472 K/UL (ref 138–453)
PMV BLD AUTO: 9.7 FL (ref 8.1–13.5)
POTASSIUM SERPL-SCNC: 3.7 MMOL/L (ref 3.7–5.3)
PROT SERPL-MCNC: 8.4 G/DL (ref 6.4–8.3)
RBC # BLD AUTO: 4.22 M/UL (ref 3.95–5.11)
SODIUM SERPL-SCNC: 137 MMOL/L (ref 135–144)
WBC OTHER # BLD: 10.6 K/UL (ref 3.5–11.3)

## 2023-08-03 PROCEDURE — 2580000003 HC RX 258

## 2023-08-03 PROCEDURE — 96375 TX/PRO/DX INJ NEW DRUG ADDON: CPT

## 2023-08-03 PROCEDURE — 74177 CT ABD & PELVIS W/CONTRAST: CPT

## 2023-08-03 PROCEDURE — 80053 COMPREHEN METABOLIC PANEL: CPT

## 2023-08-03 PROCEDURE — 99285 EMERGENCY DEPT VISIT HI MDM: CPT

## 2023-08-03 PROCEDURE — 96374 THER/PROPH/DIAG INJ IV PUSH: CPT

## 2023-08-03 PROCEDURE — 6360000004 HC RX CONTRAST MEDICATION

## 2023-08-03 PROCEDURE — 6360000002 HC RX W HCPCS: Performed by: STUDENT IN AN ORGANIZED HEALTH CARE EDUCATION/TRAINING PROGRAM

## 2023-08-03 PROCEDURE — 6370000000 HC RX 637 (ALT 250 FOR IP)

## 2023-08-03 PROCEDURE — 1200000000 HC SEMI PRIVATE

## 2023-08-03 PROCEDURE — 85025 COMPLETE CBC W/AUTO DIFF WBC: CPT

## 2023-08-03 PROCEDURE — 6360000002 HC RX W HCPCS

## 2023-08-03 PROCEDURE — 83605 ASSAY OF LACTIC ACID: CPT

## 2023-08-03 PROCEDURE — 96372 THER/PROPH/DIAG INJ SC/IM: CPT

## 2023-08-03 PROCEDURE — 2580000003 HC RX 258: Performed by: STUDENT IN AN ORGANIZED HEALTH CARE EDUCATION/TRAINING PROGRAM

## 2023-08-03 PROCEDURE — 74018 RADEX ABDOMEN 1 VIEW: CPT

## 2023-08-03 PROCEDURE — 96376 TX/PRO/DX INJ SAME DRUG ADON: CPT

## 2023-08-03 PROCEDURE — 83690 ASSAY OF LIPASE: CPT

## 2023-08-03 RX ORDER — LORAZEPAM 2 MG/ML
0.5 INJECTION INTRAMUSCULAR EVERY 8 HOURS
Status: DISCONTINUED | OUTPATIENT
Start: 2023-08-03 | End: 2023-08-04

## 2023-08-03 RX ORDER — SODIUM CHLORIDE, SODIUM LACTATE, POTASSIUM CHLORIDE, AND CALCIUM CHLORIDE .6; .31; .03; .02 G/100ML; G/100ML; G/100ML; G/100ML
1000 INJECTION, SOLUTION INTRAVENOUS ONCE
Status: COMPLETED | OUTPATIENT
Start: 2023-08-03 | End: 2023-08-03

## 2023-08-03 RX ORDER — SODIUM CHLORIDE, SODIUM LACTATE, POTASSIUM CHLORIDE, CALCIUM CHLORIDE 600; 310; 30; 20 MG/100ML; MG/100ML; MG/100ML; MG/100ML
INJECTION, SOLUTION INTRAVENOUS CONTINUOUS
Status: DISCONTINUED | OUTPATIENT
Start: 2023-08-03 | End: 2023-08-04

## 2023-08-03 RX ORDER — SODIUM CHLORIDE, SODIUM LACTATE, POTASSIUM CHLORIDE, CALCIUM CHLORIDE 600; 310; 30; 20 MG/100ML; MG/100ML; MG/100ML; MG/100ML
INJECTION, SOLUTION INTRAVENOUS CONTINUOUS
Status: DISCONTINUED | OUTPATIENT
Start: 2023-08-03 | End: 2023-08-03

## 2023-08-03 RX ORDER — PROMETHAZINE HYDROCHLORIDE 25 MG/ML
25 INJECTION, SOLUTION INTRAMUSCULAR; INTRAVENOUS ONCE
Status: COMPLETED | OUTPATIENT
Start: 2023-08-03 | End: 2023-08-03

## 2023-08-03 RX ORDER — LORAZEPAM 2 MG/ML
0.5 INJECTION INTRAMUSCULAR ONCE
Status: DISCONTINUED | OUTPATIENT
Start: 2023-08-03 | End: 2023-08-04 | Stop reason: HOSPADM

## 2023-08-03 RX ORDER — ENOXAPARIN SODIUM 100 MG/ML
40 INJECTION SUBCUTANEOUS DAILY
Status: DISCONTINUED | OUTPATIENT
Start: 2023-08-03 | End: 2023-08-04 | Stop reason: HOSPADM

## 2023-08-03 RX ORDER — 0.9 % SODIUM CHLORIDE 0.9 %
1000 INTRAVENOUS SOLUTION INTRAVENOUS ONCE
Status: DISCONTINUED | OUTPATIENT
Start: 2023-08-03 | End: 2023-08-03

## 2023-08-03 RX ORDER — FENTANYL CITRATE 50 UG/ML
50 INJECTION, SOLUTION INTRAMUSCULAR; INTRAVENOUS ONCE
Status: COMPLETED | OUTPATIENT
Start: 2023-08-03 | End: 2023-08-03

## 2023-08-03 RX ORDER — PROCHLORPERAZINE EDISYLATE 5 MG/ML
10 INJECTION INTRAMUSCULAR; INTRAVENOUS EVERY 6 HOURS
Status: DISCONTINUED | OUTPATIENT
Start: 2023-08-03 | End: 2023-08-03

## 2023-08-03 RX ORDER — KETOROLAC TROMETHAMINE 30 MG/ML
30 INJECTION, SOLUTION INTRAMUSCULAR; INTRAVENOUS EVERY 6 HOURS
Status: DISCONTINUED | OUTPATIENT
Start: 2023-08-03 | End: 2023-08-04 | Stop reason: HOSPADM

## 2023-08-03 RX ORDER — ONDANSETRON 2 MG/ML
4 INJECTION INTRAMUSCULAR; INTRAVENOUS EVERY 6 HOURS PRN
Status: DISCONTINUED | OUTPATIENT
Start: 2023-08-03 | End: 2023-08-04 | Stop reason: HOSPADM

## 2023-08-03 RX ORDER — ONDANSETRON 2 MG/ML
4 INJECTION INTRAMUSCULAR; INTRAVENOUS ONCE
Status: COMPLETED | OUTPATIENT
Start: 2023-08-03 | End: 2023-08-03

## 2023-08-03 RX ORDER — SENNA AND DOCUSATE SODIUM 50; 8.6 MG/1; MG/1
2 TABLET, FILM COATED ORAL 2 TIMES DAILY
Status: DISCONTINUED | OUTPATIENT
Start: 2023-08-03 | End: 2023-08-04 | Stop reason: HOSPADM

## 2023-08-03 RX ORDER — SCOLOPAMINE TRANSDERMAL SYSTEM 1 MG/1
1 PATCH, EXTENDED RELEASE TRANSDERMAL ONCE
Status: DISCONTINUED | OUTPATIENT
Start: 2023-08-03 | End: 2023-08-04 | Stop reason: HOSPADM

## 2023-08-03 RX ORDER — ONDANSETRON 4 MG/1
4 TABLET, ORALLY DISINTEGRATING ORAL EVERY 8 HOURS PRN
Status: DISCONTINUED | OUTPATIENT
Start: 2023-08-03 | End: 2023-08-04 | Stop reason: HOSPADM

## 2023-08-03 RX ORDER — PROCHLORPERAZINE EDISYLATE 5 MG/ML
10 INJECTION INTRAMUSCULAR; INTRAVENOUS EVERY 6 HOURS PRN
Status: DISCONTINUED | OUTPATIENT
Start: 2023-08-03 | End: 2023-08-04 | Stop reason: HOSPADM

## 2023-08-03 RX ADMIN — LORAZEPAM 0.5 MG: 2 INJECTION INTRAMUSCULAR; INTRAVENOUS at 09:58

## 2023-08-03 RX ADMIN — ONDANSETRON 4 MG: 2 INJECTION INTRAMUSCULAR; INTRAVENOUS at 02:48

## 2023-08-03 RX ADMIN — BENZOCAINE, BUTAMBEN, AND TETRACAINE HYDROCHLORIDE 1 SPRAY: .028; .004; .004 AEROSOL, SPRAY TOPICAL at 06:16

## 2023-08-03 RX ADMIN — KETOROLAC TROMETHAMINE 30 MG: 30 INJECTION, SOLUTION INTRAMUSCULAR; INTRAVENOUS at 06:32

## 2023-08-03 RX ADMIN — FENTANYL CITRATE 50 MCG: 50 INJECTION, SOLUTION INTRAMUSCULAR; INTRAVENOUS at 04:25

## 2023-08-03 RX ADMIN — KETOROLAC TROMETHAMINE 30 MG: 30 INJECTION, SOLUTION INTRAMUSCULAR; INTRAVENOUS at 12:08

## 2023-08-03 RX ADMIN — IOPAMIDOL 75 ML: 755 INJECTION, SOLUTION INTRAVENOUS at 03:59

## 2023-08-03 RX ADMIN — ONDANSETRON 4 MG: 2 INJECTION INTRAMUSCULAR; INTRAVENOUS at 04:32

## 2023-08-03 RX ADMIN — PROCHLORPERAZINE EDISYLATE 10 MG: 5 INJECTION INTRAMUSCULAR; INTRAVENOUS at 06:34

## 2023-08-03 RX ADMIN — PROCHLORPERAZINE EDISYLATE 10 MG: 5 INJECTION INTRAMUSCULAR; INTRAVENOUS at 12:08

## 2023-08-03 RX ADMIN — PROMETHAZINE HYDROCHLORIDE 25 MG: 25 INJECTION INTRAMUSCULAR; INTRAVENOUS at 05:13

## 2023-08-03 RX ADMIN — DOCUSATE SODIUM 50 MG AND SENNOSIDES 8.6 MG 2 TABLET: 8.6; 5 TABLET, FILM COATED ORAL at 20:40

## 2023-08-03 RX ADMIN — SODIUM CHLORIDE, POTASSIUM CHLORIDE, SODIUM LACTATE AND CALCIUM CHLORIDE: 600; 310; 30; 20 INJECTION, SOLUTION INTRAVENOUS at 05:23

## 2023-08-03 RX ADMIN — SODIUM CHLORIDE, POTASSIUM CHLORIDE, SODIUM LACTATE AND CALCIUM CHLORIDE: 600; 310; 30; 20 INJECTION, SOLUTION INTRAVENOUS at 06:28

## 2023-08-03 RX ADMIN — MAGNESIUM HYDROXIDE 30 ML: 400 SUSPENSION ORAL at 20:39

## 2023-08-03 RX ADMIN — SODIUM CHLORIDE, POTASSIUM CHLORIDE, SODIUM LACTATE AND CALCIUM CHLORIDE 1000 ML: 600; 310; 30; 20 INJECTION, SOLUTION INTRAVENOUS at 02:58

## 2023-08-03 RX ADMIN — FENTANYL CITRATE 50 MCG: 50 INJECTION, SOLUTION INTRAMUSCULAR; INTRAVENOUS at 02:49

## 2023-08-03 ASSESSMENT — ENCOUNTER SYMPTOMS
CONSTIPATION: 0
DIARRHEA: 0
NAUSEA: 1
VOMITING: 1
ABDOMINAL PAIN: 1
SHORTNESS OF BREATH: 0

## 2023-08-03 ASSESSMENT — PAIN SCALES - GENERAL
PAINLEVEL_OUTOF10: 0
PAINLEVEL_OUTOF10: 10
PAINLEVEL_OUTOF10: 6
PAINLEVEL_OUTOF10: 10

## 2023-08-03 ASSESSMENT — PAIN - FUNCTIONAL ASSESSMENT: PAIN_FUNCTIONAL_ASSESSMENT: 0-10

## 2023-08-03 NOTE — TELEPHONE ENCOUNTER
I called pt to remind her of f/u on 8/7/23, mom answered, states pt is currently admitted with small bowel obstruction. They will c/b and let us know if she cannot make appt Monday.

## 2023-08-03 NOTE — DISCHARGE SUMMARY
Gyn Discharge Summary  [address]      Patient Name: Imer Gama  Patient : 1985  Primary Care Physician: Kelvin Landrum  Admit Date: 8/3/2023    Principal Diagnosis: Small Bowel Obstruction    Other Diagnosis:   Small bowel obstruction ([address]) [K56.609]  Partial small bowel obstruction ([address]) [K56.600]  Patient Active Problem List   Diagnosis    Hypothyroid    H/O LEEP ()    S/P BMZ ,      Acute nonintractable headache    Right carpal tunnel syndrome    Fibromyalgia    Methotrexate, long term, current use    Raynaud's disease without gangrene    Rheumatoid arthritis with rheumatoid factor, unspecified (HCC)    Tendinopathy of left biceps tendon    Vertigo    Irregular menstrual cycle    HPV in female    Chlamydia    Adenocarcinoma of cervix ([address])    Malignant neoplasm of overlapping sites of cervix uteri ([address])    Rheumatoid arthritis ([address])    Small bowel obstruction ([address])       Infection: No  Hospital Acquired: No    Surgical Operations & Procedures: None    Consultations: None    Pertinent Findings & Procedures:   Imer Gama is a 41 y.o. female [id number], admitted for suspected partial small bowel obstruction. Discharged home on 23. Follow up in as scheduled on 23. Discharge instructions reviewed and questions answered. Course of patient: normal    HD #1 8/3/23: Patient presented to the ED where work up revealed small bowel obstruction. She was admitted to Dr. Nohemi Rand. NG tube was placed however patient removed it and refused replacement stating it was too uncomfortable. HD #2 23: CBC, CMP unremarkable. N/V resolved. +Flatus and UOP. Stable for d/c.     Discharge to: Home    Readmission planned: No    Recommendations on Discharge:     Medications:     Medication List        START taking these medications      magnesium hydroxide 400 MG/5ML suspension  Commonly known as: Milk of Magnesia  Take 30 mLs by mouth daily            CONTINUE taking

## 2023-08-03 NOTE — CARE COORDINATION
Case Management Assessment  Initial Evaluation    Date/Time of Evaluation: 8/3/2023 11:57 AM  Assessment Completed by: Kristel Hart RN    If patient is discharged prior to next notation, then this note serves as note for discharge by case management. Patient Name: Jesica Cr                   YOB: 1985  Diagnosis: Small bowel obstruction ([address]) [K56.609]  Partial small bowel obstruction ([address]) [K21.359]                   Date / Time: 8/3/2023  2:27 AM    Patient Admission Status: Inpatient   Readmission Risk (Low < 19, Mod (19-27), High > 27): Readmission Risk Score: 15.2    Current PCP: Fátima Hermosillo  PCP verified by CM? Yes    Chart Reviewed: Yes      History Provided by: Patient  Patient Orientation: Alert and Oriented    Patient Cognition: Alert    Hospitalization in the last 30 days (Readmission):  Yes    If yes, Readmission Assessment in CM Navigator will be completed. Advance Directives:      Code Status: Full Code   Patient's Primary Decision Maker is:        Discharge Planning:    Patient lives with: Children Type of Home: House  Primary Care Giver: Self  Patient Support Systems include: Children, Parent   Current Financial resources:    Current community resources:    Current services prior to admission: None            Current DME:              Type of Home Care services:  None    ADLS  Prior functional level: Independent in ADLs/IADLs  Current functional level: Independent in ADLs/IADLs    PT AM-PAC:   /24  OT AM-PAC:   /24    Family can provide assistance at DC: Would you like Case Management to discuss the discharge plan with any other family members/significant others, and if so, who?     Plans to Return to Present Housing: Yes  Other Identified Issues/Barriers to RETURNING to current housing: none  Potential Assistance needed at discharge: N/A            Potential DME:    Patient expects to discharge to: [address] [place] [address] for transportation at discharge:

## 2023-08-03 NOTE — ED NOTES
Pt resting on cot, eyes closed. RR even and NL. No distress noted at this time. Family at bedside. Call light within reach.  Will continue with plan of care     [facility], RN  08/03/23 1420

## 2023-08-03 NOTE — CARE COORDINATION
08/03/23 1156   Readmission Assessment   Number of Days since last admission? 8-30 days   Previous Disposition Home with Family   Who is being Michelle Emanuel  (mother)   What was the patient's/caregiver's perception as to why they think they needed to return back to the hospital? Other (Comment)  (symptoms)   Did you visit your Primary Care Physician after you left the hospital, before you returned this time? No   Why weren't you able to visit your PCP? Did not have an appointment   Did you see a specialist, such as Cardiac, Pulmonary, Orthopedic Physician, etc. after you left the hospital? Yes   Who advised the patient to return to the hospital? Self-referral   Does the patient report anything that got in the way of taking their medications? No   In our efforts to provide the best possible care to you and others like you, can you think of anything that we could have done to help you after you left the hospital the first time, so that you might not have needed to return so soon?  Discharge instructions that are concise, clear, and non contradictory

## 2023-08-03 NOTE — ED NOTES
Pt to ED c/o abd pain and vomiting since yesterday. Pt had a hysterectomy 07/16 and hasn't had any trouble until recently. Pt states she can't keep anything down. Pt had been noticing drainage from the incision over her belly button with odor since Monday when she got her staples removed. Pt was initially placed on clindamycin, but was taken off of it because it was making her sick. Pt had hysterectomy due to having cervical/uterine cancer. Upon arrival pt is visibly in pain and uncomfortable. Pts abdomen is tender all over, but especially over belly button and on the left side. Pt placed on continuous cardiac monitor, bp and pulse ox. IV established, blood work drawn. Pt alert and oriented x4, talking in complete sentences, respirations even and unlabored. Pt acting age appropriate.  White board updated, will continue to plan of care         [employer], RN  08/03/23 1175

## 2023-08-03 NOTE — CONSULTS
[address]    Patient Name: Margarita Leigh     Patient : 1985  Room/Bed:   Admission Date/Time: 8/3/2023  2:27 AM  Primary Care Physician: Sunil Fields    Consulting Provider: Dr. David Dick  Reason for Consult: Abdominal pain, N/V     CC:   Chief Complaint   Patient presents with    Abdominal Pain    Emesis                HPI: Margarita Leigh is a 41 y.o. female [id number] presents to the ED, c/o abdominal pain and significant nausea/vomiting. Patient underwent robotic lymph node dissection with an open radical hysterectomy on 2023 secondary to adenocarcinoma of the cervix. The patient reported going to have her staples removed on , and subsequently developed significant abdominal pain. She developed a small amount of purulent discharge from the umbilical portion of the incision, which she was placed on clindamycin for. She noted that her abdominal pain and nausea/vomiting worsened, and reported chills which led to her presentation to the ED today. She has been able to void and has had consistent bowel movements in her postoperative course. Patient's last menstrual period was 2023 (approximate). REVIEW OF SYSTEMS:   A minimum of an eleven point review of systems was completed.       Constitutional: negative fever, + chills  HEENT: negative visual disturbances, negative headaches  Respiratory: negative dyspnea, negative cough  Cardiovascular: negative chest pain,  negative palpitations  Gastrointestinal: + abdominal pain, negative RUQ pain, + N/V, negative diarrhea, negative constipation  Genitourinary: negative dysuria, negative vaginal discharge, negative vaginal bleeding  Dermatological: negative rash, negative wounds  Hematologic: negative bleeding/clotting disorder  Immunologic: negative recent illness, negative recent sick contact, negative allergic reactions  Lymphatic: negative lymph nodes  Musculoskeletal: negative back pain, Vertigo 11/21/2022    Methotrexate, long term, current use 10/18/2022    Raynaud's disease without gangrene 10/18/2022    Rheumatoid arthritis with rheumatoid factor, unspecified ([address]) 10/18/2022    Right carpal tunnel syndrome     Acute nonintractable headache 12/12/2017    Hypothyroid 02/11/2015     Synthroid 50 mcg/day    Labs have been normal throughout pregnancy. With last one 6/8/15. H/O BERT (2009) 02/11/2015       Plan discussed with Nancy Hahn and Gadiel Phillip, who are agreeable.      Attending's Name: Dr. Nerissa Villanueva MD  Ob/Gyn Resident   [address]  8/3/2023, 6:54 AM

## 2023-08-03 NOTE — PROGRESS NOTES
Gynecology Oncology Resident Interval Note    Writer to bedside for exam. Upon arrival, patient is very anxious and has self-removed her NGT. The patient reports the NGT was too painful and she felt she could not breath while it was in place. Patient reports an episode of emesis at approximately 0100 and denies significant nausea/vomiting at this time. Her last BM was yesterday. She denies any acute changes in abdominal pain. On physical exam, the patient appears very anxious but is in no acute distress, non-toxic appearing. VSS. Abdomen is mildly tender to palpation throughout; no distension, guarding, rigidity, or rebound. Incisions are clean/dry/intact. Incision at umbilicus has minimal dried drainage, without erythema or warmth to touch. Denies N/V, positive flatus. Lengthy discussion with patient about replacement of NGT for treatment for her newly diagnosed partial small bowel obstruction. Discussed the R/B/A of NGT to low-intermittent wall suction VS conservative management. Offered the patient additional pain control with dilaudid pushes and Ativan PRN to control the anxiety-component of her discomfort. The patient reports she will think about it and make her decision later. Partial Small Bowel Obstruction    - VSS, pt in no acute distress   - Toradol 30mg IV q6hrs    - Dilaudid 0.5 mg q2hrs PRN   - Compazine 10mg q6hrs    - Zofran PRN    - Ativan 0.5mg q6hrs PRN    - Pt to consider NG tube replacement   - LR @ 125 ml/hr    - Strict I/Os   - Continue to monitor closely     Attending Physician updated and in agreement with plan. Rose Marie Raymond MD  OB/GYN Resident, PGY2  [place], [place]  8/3/2023, 8:24 AM    Attending Physician Statement  I have discussed the care of [facility], including pertinent history and exam findings, with the resident. I have seen and examined the patient and the key elements of all parts of the encounter have been performed by me.  I

## 2023-08-03 NOTE — ED NOTES
No change to report sheet  Pt.  Unable to tolerate NG, admitting team made aware by previous shift        Marjorie Augustin RN  08/03/23 2556

## 2023-08-03 NOTE — ED NOTES
ED to inpatient nurses report      Chief Complaint:  Chief Complaint   Patient presents with    Abdominal Pain    Emesis     Present to ED from home c/o abd pain and vomiting since yesterday. Pt had a hysterectomy 07/16 and hasn't had any trouble until recently. Pt states she can't keep anything down. Pt had been noticing drainage from the incision over her belly button with odor since Monday when she got her staples removed. Pt was initially placed on clindamycin, but was taken off of it because it was making her sick. Pt had hysterectomy due to having cervical/uterine cancer. Upon arrival pt is visibly in pain and uncomfortable. Pts abdomen is tender all over, but especially over belly button and on the left side. NG tube was placed but has been removed due to pt not tolerating it. MOA:     LOC: alert and orientated to name, place, date  Mobility: Independent, maybe +1 assist due to pain  Oxygen Baseline: n/a    Current needs required: n/a   Pending ED orders: n/a  Present condition: resting in cot, no distress, but pt is visibly uncomfortable. Pt has been given pain medications. Why did the patient come to the ED? Abd pain   What is the plan? Admit for partial small bowel obstruction, post op pain/complication   Any procedures or intervention occur?  Labs, CT, NG(has been removed since)    Mental Status:  Level of Consciousness: Alert (0)    Psych Assessment:   Psychosocial  Psychosocial (WDL): Within Defined Limits  Vital signs   Vitals:    08/03/23 0330 08/03/23 0356 08/03/23 0408 08/03/23 0500   BP: 103/76  108/62 101/76   Pulse: 81 82 91 81   Resp: 20 14 19 18   Temp:       TempSrc:       SpO2: 95% 99% 100% 99%   Weight:            Vitals:  Patient Vitals for the past 24 hrs:   BP Temp Temp src Pulse Resp SpO2 Weight   08/03/23 0500 101/76 -- -- 81 18 99 % --   08/03/23 0408 108/62 -- -- 91 19 100 % --   08/03/23 0356 -- -- -- 82 14 99 % --   08/03/23 0330 103/76 -- -- 81 20 95 % --   08/03/23 0325 -- -- (CLEOCIN) 300 MG CAPSULE    Take 1 capsule by mouth 4 times daily for 7 days    ENOXAPARIN (LOVENOX) 40 MG/0.4ML    Inject 0.4 mLs into the skin daily for 25 days    GABAPENTIN (NEURONTIN) 300 MG CAPSULE    Take 1 capsule by mouth 3 times daily for 60 days. Intended supply: 90 days    LEUCOVORIN CALCIUM (WELLCOVORIN) 5 MG TABLET    TAKE 1 TABLET BY MOUTH 1 TIME PER WEEK WITH A FULL GLASS OF WATER 24 HOURS AFTER METHOTREXATE DOSE    METHOTREXATE (RHEUMATREX) 2.5 MG CHEMO TABLET    Take by mouth once a week 20 mg 1x a week.     SENNA (SENOKOT) 8.6 MG TABS TABLET    Take 2 tablets by mouth daily    TOFACITINIB CITRATE ER (XELJANZ XR) 11 MG TB24    Take by mouth in the morning and at bedtime     Orders Placed This Encounter   Medications    DISCONTD: sodium chloride 0.9 % bolus 1,000 mL    ondansetron (ZOFRAN) injection 4 mg    fentaNYL (SUBLIMAZE) injection 50 mcg    DISCONTD: lactated ringers IV soln infusion    lactated ringers bolus bolus 1,000 mL    iopamidol (ISOVUE-370) 76 % injection 75 mL    fentaNYL (SUBLIMAZE) injection 50 mcg    ondansetron (ZOFRAN) injection 4 mg    promethazine (PHENERGAN) injection 25 mg    DISCONTD: lactated ringers IV soln infusion    DISCONTD: phenol 1.4 % mouth spray 1 spray    butamben-tetracaine-benzocaine (CETACAINE) spray 1 spray    OR Linked Order Group     ondansetron (ZOFRAN-ODT) disintegrating tablet 4 mg     ondansetron (ZOFRAN) injection 4 mg    prochlorperazine (COMPAZINE) injection 10 mg    lactated ringers IV soln infusion    scopolamine (TRANSDERM-SCOP) transdermal patch 1 patch    ketorolac (TORADOL) injection 30 mg    LORazepam (ATIVAN) injection 0.5 mg       SURGICAL HISTORY       Past Surgical History:   Procedure Laterality Date    CERVICAL CERCLAGE  04/11/2015    DILATION AND CURETTAGE      x 2    HYSTERECTOMY (CERVIX STATUS UNKNOWN) N/A 7/17/2023    INTRA-CERVICAL ICG DYE CERVIX INJECTION, SENTINEL LYMPH NODE MAPPING, XI ROBOTIC ASSISTED BILATERAL PELVIC AND

## 2023-08-03 NOTE — ED NOTES
Pt back from CT, resting on cot, RR even and NL. No distress noted at this time. Family at bedside. Call light within reach. Will continue with plan of care.       Lauren Dillon RN  08/03/23 8740

## 2023-08-03 NOTE — H&P
OB/GYN H&P  [facility]    Patient Name: Luis Patel     Patient : 1985  Room/Bed:   Admission Date/Time: 8/3/2023  2:27 AM  Primary Care Physician: Margy Siu      CC:   Chief Complaint   Patient presents with    Abdominal Pain    Emesis                HPI: Luis Patel is a 41 y.o. female [id number] presents to the ED, c/o abdominal pain and significant nausea/vomiting. Patient underwent robotic lymph node dissection with an open radical hysterectomy on 2023 secondary to adenocarcinoma of the cervix. The patient reported going to have her staples removed on , and subsequently developed significant abdominal pain. She developed a small amount of purulent discharge from the umbilical portion of the incision, which she was placed on clindamycin for. She noted that her abdominal pain and nausea/vomiting worsened, and reported chills which led to her presentation to the ED today. She has been able to void and has had consistent bowel movements in her postoperative course. Patient's last menstrual period was 2023 (approximate). REVIEW OF SYSTEMS:   A minimum of an eleven point review of systems was completed.       Constitutional: negative fever, + chills  HEENT: negative visual disturbances, negative headaches  Respiratory: negative dyspnea, negative cough  Cardiovascular: negative chest pain,  negative palpitations  Gastrointestinal: + abdominal pain, negative RUQ pain, + N/V, negative diarrhea, negative constipation  Genitourinary: negative dysuria, negative vaginal discharge, negative vaginal bleeding  Dermatological: negative rash, negative wounds  Hematologic: negative bleeding/clotting disorder  Immunologic: negative recent illness, negative recent sick contact, negative allergic reactions  Lymphatic: negative lymph nodes  Musculoskeletal: negative back pain, negative myalgias, negative arthralgias  Neurological:  negative dizziness,

## 2023-08-03 NOTE — ED PROVIDER NOTES
Right Ear: External ear normal.      Left Ear: External ear normal.      Nose: Nose normal.      Mouth/Throat:      Mouth: Mucous membranes are moist.   Eyes:      Extraocular Movements: Extraocular movements intact. Conjunctiva/sclera: Conjunctivae normal.   Cardiovascular:      Rate and Rhythm: Tachycardia present. Pulses: Normal pulses. Heart sounds: Normal heart sounds. No murmur heard. No friction rub. No gallop. Pulmonary:      Effort: Pulmonary effort is normal. No respiratory distress. Breath sounds: Normal breath sounds. No wheezing, rhonchi or rales. Comments: Tachypneic, able speak in full sentences  Abdominal:      General: Abdomen is flat. There is no distension. Palpations: Abdomen is soft. Tenderness: There is generalized abdominal tenderness and tenderness in the left lower quadrant. There is guarding. Comments: Generalized abdominal tenderness, severe TTP over left lower quadrant and midline abdominal incision. Lap sites C/D/I with skin glue without surrounding erythema. Midline incision with Steri-Strips, no active drainage from the incision, no surrounding erythema. Musculoskeletal:      Cervical back: Normal range of motion. Comments: Moves all extremities   Skin:     General: Skin is warm and dry. Neurological:      General: No focal deficit present. Mental Status: She is alert and oriented to person, place, and time. Psychiatric:         Mood and Affect: Mood normal.         Behavior: Behavior normal.         Thought Content: Thought content normal.         Judgment: Judgment normal.         DDX/DIAGNOSTIC RESULTS / EMERGENCY DEPARTMENT COURSE / MDM     Medical Decision Making  Is a 44-year-old female POD 25 s/p hysterectomy here for abdominal pain, nausea, vomiting, and endorsing purulent, foul-smelling drainage from midline incision. Patient is tachycardic, hypotensive, tachypneic, afebrile.   Mother reports that she was hypotensive while in the hospital.  Patient given 1 L bolus, fentanyl and Zofran, after which patient reported better, resolution of tachycardia and improvement in blood pressure. Repeat lactate was 1.3 from 3.1 prebolus. Rest of labs are unremarkable. CT abdomen pelvis with contrast showed concern for partial small bowel obstruction and pelvic fluid. OB GYN with decision to place NG tube and admit. Amount and/or Complexity of Data Reviewed  Independent Historian: parent     Details: Mother  External Data Reviewed: labs and notes. Details: Previous admission  Labs: ordered. Decision-making details documented in ED Course. Radiology: ordered and independent interpretation performed. Decision-making details documented in ED Course. Risk  Prescription drug management. Decision regarding hospitalization. Risk Details: Patient with OB to obtain CT A/P. After CT resulted, decision made to place NG tube and admit.         EMERGENCY DEPARTMENT COURSE:    ED Course as of 08/03/23 0641   Thu Aug 03, 2023   0249 Fentanyl and Zofran [AS]   0256 Lactate, Sepsis(!):    Lactic Acid, Sepsis, Whole Blood 3.1(!) [AS]   0256 CBC with Auto Differential(!):    WBC 10.6   RBC 4.22   Hemoglobin Quant 12.6   Hematocrit 38.5   MCV 91.2   MCH 29.9   MCHC 32.7   RDW 13.9   Platelet Count 212(!)   MPV 9.7   NRBC Automated 0.0   Neutrophils % 82(!)   Lymphocyte % 10(!)   Monocytes % 7   Eosinophils % 0(!)   Basophils % 0   Immature Granulocytes 1(!)   Neutrophils Absolute 8.67(!)   Lymphocytes Absolute 1.10   Monocytes Absolute 0.77   Eosinophils Absolute <0.03   Basophils Absolute <0.03   Absolute Immature Granulocyte 0.05 [AS]   0256 Comprehensive Metabolic Panel(!):    Glucose, Random 125(!)   BUN,BUNPL 10   Creatinine 0.7   Est, Glom Filt Rate >60   CALCIUM, SERUM, 358957 10.2   Sodium 137   Potassium 3.7   Chloride 98   CO2 20   Anion Gap 19(!)   Alk Phos 88   ALT 12   AST 19   BILIRUBIN TOTAL 0.5   Total Protein 8.4(!)

## 2023-08-03 NOTE — PROGRESS NOTES
[address] [facility]    Patient Name: Trudie Gitelman     Patient : 1985  Room/Bed:   Admission Date/Time: 8/3/2023  2:27 AM  Primary Care Physician: Katya Lujan    Consulting Provider: Dr. Kelli Gallego  Reason for Consult: Abdominal pain, N/V     CC:   Chief Complaint   Patient presents with    Abdominal Pain    Emesis                HPI: Trudie Gitelman is a 41 y.o. female [id number] presents to the ED, c/o abdominal pain and significant nausea/vomiting. Patient underwent robotic lymph node dissection with an open radical hysterectomy on 2023 secondary to adenocarcinoma of the cervix. The patient reported going to have her staples removed on , and subsequently developed significant abdominal pain. She developed a small amount of purulent discharge from the umbilical portion of the incision, which she was placed on clindamycin for. She noted that her abdominal pain and nausea/vomiting worsened, and reported chills which led to her presentation to the ED today. She has been able to void and has had consistent bowel movements in her postoperative course. Patient's last menstrual period was 2023 (approximate). REVIEW OF SYSTEMS:   A minimum of an eleven point review of systems was completed.       Constitutional: negative fever, + chills  HEENT: negative visual disturbances, negative headaches  Respiratory: negative dyspnea, negative cough  Cardiovascular: negative chest pain,  negative palpitations  Gastrointestinal: + abdominal pain, negative RUQ pain, + N/V, negative diarrhea, negative constipation  Genitourinary: negative dysuria, negative vaginal discharge, negative vaginal bleeding  Dermatological: negative rash, negative wounds  Hematologic: negative bleeding/clotting disorder  Immunologic: negative recent illness, negative recent sick contact, negative allergic reactions  Lymphatic: negative lymph nodes  Musculoskeletal: negative back pain, administration of intravenous contrast.  Multiplanar reformatted images are provided for review. MIP images are provided for review. Automated exposure control, iterative reconstruction, and/or weight based adjustment of the mA/kV was utilized to reduce the radiation dose to as low as reasonably achievable. COMPARISON: 01/02/2014 HISTORY: ORDERING SYSTEM PROVIDED HISTORY: newly developed tachycardia, hypotension, difficulty breathing TECHNOLOGIST PROVIDED HISTORY: newly developed tachycardia, hypotension, difficulty breathing Reason for Exam: newly developed tachycardia, hypotension, difficulty breathing FINDINGS: Pulmonary Arteries: The pulmonary arteries are adequately opacified. No filling defects are seen within the pulmonary arteries to suggest pulmonary embolism. The main pulmonary artery is normal in caliber. Mediastinum: Visualized thyroid is unremarkable. No mediastinal or hilar lymphadenopathy is identified. The esophagus is unremarkable. Cardiac chambers unremarkable. Thoracic aorta normal in caliber without evidence of dissection. Lungs/pleura: Small bilateral pleural effusions are noted. Mild adjacent airspace disease is noted. No acute infiltrate is detected within the lungs otherwise. Upper Abdomen: There is free intracranial air. According to the electronic medical record, the patient is status post hysterectomy on 07/17/2023, and presumably this is postsurgical gas. Limited imaging of the upper abdomen is otherwise unremarkable in appearance. Soft Tissues/Bones: Gas within the left chest wall is seen, again presumably postsurgical.  No acute bony abnormalities are detected. No evidence of pulmonary embolism or aortic dissection. Small bilateral pleural effusions. Mild adjacent airspace disease is likely atelectasis. Free intraperitoneal air which is presumably postsurgical.  Please correlate clinically.        ASSESSMENT & PLAN:    Rosetta Roy is a 41 y.o. female [id number] with

## 2023-08-03 NOTE — ED NOTES
Pt resting on cot, a/o x4, RR even and NL. Pt complaining of nausea even after the zofran. Resident notified, waiting for phenergan to come from pharmacy. Family at bedside. Call light within reach. Will continue with plan of care.       Nikos Ruiz RN  08/03/23 [id number]

## 2023-08-04 VITALS
WEIGHT: 132 LBS | TEMPERATURE: 97.7 F | HEART RATE: 82 BPM | BODY MASS INDEX: 23.38 KG/M2 | SYSTOLIC BLOOD PRESSURE: 95 MMHG | DIASTOLIC BLOOD PRESSURE: 62 MMHG | RESPIRATION RATE: 13 BRPM | OXYGEN SATURATION: 99 %

## 2023-08-04 LAB
ALBUMIN SERPL-MCNC: 3.4 G/DL (ref 3.5–5.2)
ALBUMIN/GLOB SERPL: 1.4 {RATIO} (ref 1–2.5)
ALP SERPL-CCNC: 60 U/L (ref 35–104)
ALT SERPL-CCNC: 8 U/L (ref 5–33)
ANION GAP SERPL CALCULATED.3IONS-SCNC: 12 MMOL/L (ref 9–17)
AST SERPL-CCNC: 15 U/L
BASOPHILS # BLD: <0.03 K/UL (ref 0–0.2)
BASOPHILS NFR BLD: 0 % (ref 0–2)
BILIRUB SERPL-MCNC: 0.3 MG/DL (ref 0.3–1.2)
BUN SERPL-MCNC: 5 MG/DL (ref 6–20)
CALCIUM SERPL-MCNC: 8.5 MG/DL (ref 8.6–10.4)
CHLORIDE SERPL-SCNC: 103 MMOL/L (ref 98–107)
CO2 SERPL-SCNC: 24 MMOL/L (ref 20–31)
CREAT SERPL-MCNC: 0.4 MG/DL (ref 0.5–0.9)
EOSINOPHIL # BLD: 0.15 K/UL (ref 0–0.44)
EOSINOPHILS RELATIVE PERCENT: 3 % (ref 1–4)
ERYTHROCYTE [DISTWIDTH] IN BLOOD BY AUTOMATED COUNT: 14.3 % (ref 11.8–14.4)
GFR SERPL CREATININE-BSD FRML MDRD: >60 ML/MIN/1.73M2
GLUCOSE SERPL-MCNC: 83 MG/DL (ref 70–99)
HCT VFR BLD AUTO: 28.9 % (ref 36.3–47.1)
HGB BLD-MCNC: 9.1 G/DL (ref 11.9–15.1)
IMM GRANULOCYTES # BLD AUTO: <0.03 K/UL (ref 0–0.3)
IMM GRANULOCYTES NFR BLD: 0 %
LYMPHOCYTES NFR BLD: 1.41 K/UL (ref 1.1–3.7)
LYMPHOCYTES RELATIVE PERCENT: 29 % (ref 24–43)
MCH RBC QN AUTO: 30.5 PG (ref 25.2–33.5)
MCHC RBC AUTO-ENTMCNC: 31.5 G/DL (ref 28.4–34.8)
MCV RBC AUTO: 97 FL (ref 82.6–102.9)
MONOCYTES NFR BLD: 0.71 K/UL (ref 0.1–1.2)
MONOCYTES NFR BLD: 15 % (ref 3–12)
NEUTROPHILS NFR BLD: 53 % (ref 36–65)
NEUTS SEG NFR BLD: 2.55 K/UL (ref 1.5–8.1)
NRBC BLD-RTO: 0 PER 100 WBC
PLATELET # BLD AUTO: 273 K/UL (ref 138–453)
PMV BLD AUTO: 10.2 FL (ref 8.1–13.5)
POTASSIUM SERPL-SCNC: 3.9 MMOL/L (ref 3.7–5.3)
PROT SERPL-MCNC: 5.9 G/DL (ref 6.4–8.3)
RBC # BLD AUTO: 2.98 M/UL (ref 3.95–5.11)
SODIUM SERPL-SCNC: 139 MMOL/L (ref 135–144)
WBC OTHER # BLD: 4.9 K/UL (ref 3.5–11.3)

## 2023-08-04 PROCEDURE — 85025 COMPLETE CBC W/AUTO DIFF WBC: CPT

## 2023-08-04 PROCEDURE — 36415 COLL VENOUS BLD VENIPUNCTURE: CPT

## 2023-08-04 PROCEDURE — 80053 COMPREHEN METABOLIC PANEL: CPT

## 2023-08-04 RX ORDER — LORAZEPAM 2 MG/ML
0.5 INJECTION INTRAMUSCULAR EVERY 8 HOURS PRN
Status: DISCONTINUED | OUTPATIENT
Start: 2023-08-04 | End: 2023-08-04 | Stop reason: HOSPADM

## 2023-08-09 ENCOUNTER — TELEPHONE (OUTPATIENT)
Dept: ONCOLOGY | Age: 38
End: 2023-08-09

## 2023-08-09 NOTE — TELEPHONE ENCOUNTER
Sherilyn Mortimer., [address] [facility] gyn onc, alerted writer no adjuvant tx needed. Name: Pricila Lott  : 1985  MRN: [id number]    Oncology Navigation Follow-Up Note    Contact Type:  Telephone    Notes: Spoke w/pt for f/u call. Pt denied questions/concerns. Pt stated in process of switching [place] med[place] to [facility]. Notified pt navigation ended & may contact writer prn.     Electronically signed by Rogerio Chan RN on 2023 at 1:53 PM

## 2023-09-06 ENCOUNTER — OFFICE VISIT (OUTPATIENT)
Dept: GYNECOLOGIC ONCOLOGY | Age: 38
End: 2023-09-06

## 2023-09-06 VITALS
WEIGHT: 132.2 LBS | OXYGEN SATURATION: 100 % | BODY MASS INDEX: 23.42 KG/M2 | HEIGHT: 63 IN | DIASTOLIC BLOOD PRESSURE: 75 MMHG | TEMPERATURE: 97.1 F | HEART RATE: 83 BPM | SYSTOLIC BLOOD PRESSURE: 105 MMHG

## 2023-09-06 DIAGNOSIS — C53.9 ADENOCARCINOMA OF CERVIX (HCC): Primary | ICD-10-CM

## 2023-09-06 PROCEDURE — 99024 POSTOP FOLLOW-UP VISIT: CPT | Performed by: PHYSICIAN ASSISTANT

## 2023-09-06 ASSESSMENT — ENCOUNTER SYMPTOMS
GASTROINTESTINAL NEGATIVE: 1
RESPIRATORY NEGATIVE: 1
BACK PAIN: 0

## 2023-12-07 ENCOUNTER — HOSPITAL ENCOUNTER (OUTPATIENT)
Age: 38
Setting detail: SPECIMEN
Discharge: HOME | End: 2023-12-07

## 2023-12-07 ENCOUNTER — OFFICE VISIT (OUTPATIENT)
Dept: GYNECOLOGIC ONCOLOGY | Age: 38
End: 2023-12-07
Payer: COMMERCIAL

## 2023-12-07 VITALS
DIASTOLIC BLOOD PRESSURE: 79 MMHG | BODY MASS INDEX: 23.25 KG/M2 | HEART RATE: 91 BPM | SYSTOLIC BLOOD PRESSURE: 115 MMHG | HEIGHT: 63 IN | WEIGHT: 131.2 LBS | OXYGEN SATURATION: 99 %

## 2023-12-07 DIAGNOSIS — N89.8 VAGINAL DISCHARGE: Primary | ICD-10-CM

## 2023-12-07 DIAGNOSIS — N89.8 VAGINAL DISCHARGE: ICD-10-CM

## 2023-12-07 PROCEDURE — G8427 DOCREV CUR MEDS BY ELIG CLIN: HCPCS | Performed by: PHYSICIAN ASSISTANT

## 2023-12-07 PROCEDURE — 1036F TOBACCO NON-USER: CPT | Performed by: PHYSICIAN ASSISTANT

## 2023-12-07 PROCEDURE — G8420 CALC BMI NORM PARAMETERS: HCPCS | Performed by: PHYSICIAN ASSISTANT

## 2023-12-07 PROCEDURE — G8484 FLU IMMUNIZE NO ADMIN: HCPCS | Performed by: PHYSICIAN ASSISTANT

## 2023-12-07 PROCEDURE — 99214 OFFICE O/P EST MOD 30 MIN: CPT | Performed by: PHYSICIAN ASSISTANT

## 2023-12-07 RX ORDER — TOCILIZUMAB 180 MG/ML
162 INJECTION, SOLUTION SUBCUTANEOUS
COMMUNITY
Start: 2023-09-11

## 2023-12-07 ASSESSMENT — ENCOUNTER SYMPTOMS
EYES NEGATIVE: 1
GASTROINTESTINAL NEGATIVE: 1
RESPIRATORY NEGATIVE: 1

## 2023-12-07 NOTE — PROGRESS NOTES
[address] [facility], [address]  [address] [facility] present for her cervical cancer surveillance visit. CC/HPI:  Patient is a pleasant 70-year-old premenopausal female who was referred to [facility] gynecologic oncology for findings of adenocarcinoma of the cervix based on multiple cervical biopsies in June 2023. Of note the patient did have a history of previous cervical dysplasia which she underwent a LEEP procedure back in 2010. She has had follow-up serial Pap smears which have been within normal findings until the most recent abnormalities in June 2023. Patient's Pap smear in April 2023 was negative for intraepithelial lesion or malignancy however HPV 16 positive. And she returned for a couple scopic evaluation in June 2023 with multiple cervical biopsies consistent with adenocarcinoma of endocervical origin. Patient was seen in [facility] gynecologic oncology for further evaluation and treatment recommendations. Patient's clinical examination was consistent with approximately 4.5 cm raised lesion at the exocervix on her bimanual examination the cervix was freely mobile and large and barrel-shaped. She felt to be a clinical stage IIb disease. A PET CT scan was completed on June 20, 2023 which revealed a uptake in the cervical region and endometrial cavity. There were scattered areas in the vagina which were nonspecific there is no pelvic lymphadenopathy or distant metastatic disease. Patient completed MRI pelvis for further evaluation on June 29, 2023 revealed the cervical mass measuring 3.3 x 1.6 cm. There is no enlarged or suspicious lymph nodes. The patient was then reexamined clinically and felt the patient could proceed with a radical hysterectomy approach. Patient wished to preserve her ovaries.     She ultimately underwent a ICG injection, sentinel lymph node mapping and robotic right pelvic sentinel lymph node biopsies and

## 2023-12-11 LAB
CANDIDA SPECIES: NEGATIVE
GARDNERELLA VAGINALIS: NEGATIVE
SOURCE: NORMAL
TRICHOMONAS: NEGATIVE

## 2024-03-05 NOTE — PROGRESS NOTES
[facility] Gynecologic Oncology  [address] [facility] [facility] [address]  [place] [address]    Kasey Davis present for her cervical cancer surveillance visit.     CC/HPI:  Patient is a pleasant 38-year-old premenopausal female who was referred to [facility] gynecologic oncology for findings of adenocarcinoma of the cervix based on multiple cervical biopsies in June 2023.    Of note the patient did have a history of previous cervical dysplasia which she underwent a LEEP procedure back in 2010.  She has had follow-up serial Pap smears which have been within normal findings until the most recent abnormalities in June 2023.    Patient's Pap smear in April 2023 was negative for intraepithelial lesion or malignancy however HPV 16 positive.  And she returned for a colposcopy evaluation in June 2023 with multiple cervical biopsies consistent with adenocarcinoma of endocervical origin.    Patient was seen in [facility] gynecologic oncology for further evaluation and treatment recommendations.  Patient's clinical examination was consistent with approximately 4.5 cm raised lesion at the exocervix on her bimanual examination the cervix was freely mobile and large and barrel-shaped.  She felt to be a clinical stage IIb disease.     PET CT scan was completed on June 20, 2023 which revealed a uptake in the cervical region and endometrial cavity.  There were scattered areas in the vagina which were nonspecific there is no pelvic lymphadenopathy or distant metastatic disease.    Patient completed MRI pelvis for further evaluation on June 29, 2023 revealed the cervical mass measuring 3.3 x 1.6 cm.  There is no enlarged or suspicious lymph nodes.    The patient was then reexamined clinically and felt the patient could proceed with a radical hysterectomy approach. Patient wished to preserve her ovaries.    She ultimately underwent a ICG injection, sentinel lymph node mapping and robotic right pelvic sentinel lymph node biopsies and right

## 2024-03-06 ENCOUNTER — HOSPITAL ENCOUNTER (OUTPATIENT)
Age: 39
Setting detail: SPECIMEN
Discharge: HOME | End: 2024-03-06

## 2024-03-06 ENCOUNTER — OFFICE VISIT (OUTPATIENT)
Dept: GYNECOLOGIC ONCOLOGY | Age: 39
End: 2024-03-06
Payer: COMMERCIAL

## 2024-03-06 VITALS
BODY MASS INDEX: 23.42 KG/M2 | SYSTOLIC BLOOD PRESSURE: 108 MMHG | WEIGHT: 132.2 LBS | DIASTOLIC BLOOD PRESSURE: 70 MMHG | HEIGHT: 63 IN

## 2024-03-06 DIAGNOSIS — R10.2 PELVIC PAIN: ICD-10-CM

## 2024-03-06 DIAGNOSIS — C53.0 MALIGNANT NEOPLASM OF ENDOCERVIX (HCC): Primary | ICD-10-CM

## 2024-03-06 PROBLEM — C53.9 ADENOCARCINOMA OF CERVIX (HCC): Status: RESOLVED | Noted: 2023-06-15 | Resolved: 2024-03-06

## 2024-03-06 PROCEDURE — G8484 FLU IMMUNIZE NO ADMIN: HCPCS | Performed by: PHYSICIAN ASSISTANT

## 2024-03-06 PROCEDURE — 99214 OFFICE O/P EST MOD 30 MIN: CPT | Performed by: PHYSICIAN ASSISTANT

## 2024-03-06 PROCEDURE — 1036F TOBACCO NON-USER: CPT | Performed by: PHYSICIAN ASSISTANT

## 2024-03-06 PROCEDURE — G8427 DOCREV CUR MEDS BY ELIG CLIN: HCPCS | Performed by: PHYSICIAN ASSISTANT

## 2024-03-06 PROCEDURE — G8420 CALC BMI NORM PARAMETERS: HCPCS | Performed by: PHYSICIAN ASSISTANT

## 2024-03-06 ASSESSMENT — ENCOUNTER SYMPTOMS
RESPIRATORY NEGATIVE: 1
EYES NEGATIVE: 1
GASTROINTESTINAL NEGATIVE: 1

## 2024-03-06 NOTE — PROGRESS NOTES
Review of Systems   Constitutional: Negative.    HENT:  Negative.     Eyes: Negative.    Respiratory: Negative.     Cardiovascular: Negative.    Gastrointestinal: Negative.    Endocrine: Negative.    Genitourinary:  Positive for pelvic pain (left).    Musculoskeletal: Negative.    Skin:  Positive for rash (left knee).   Neurological:  Positive for headaches.   Hematological:  Bruises/bleeds easily.

## 2024-03-07 LAB
MICROORGANISM SPEC CULT: NORMAL
SPECIMEN DESCRIPTION: NORMAL

## 2024-03-12 LAB
HPV I/H RISK 4 DNA CVX QL NAA+PROBE: NOT DETECTED
HPV SAMPLE: NORMAL
HPV, INTERPRETATION: NORMAL
HPV16 DNA CVX QL NAA+PROBE: NOT DETECTED
HPV18 DNA CVX QL NAA+PROBE: NOT DETECTED
SPECIMEN DESCRIPTION: NORMAL

## 2024-03-19 LAB — CYTOLOGY REPORT: NORMAL

## 2024-03-20 ENCOUNTER — HOSPITAL ENCOUNTER (OUTPATIENT)
Dept: CT IMAGING | Age: 39
Discharge: HOME | End: 2024-03-22
Payer: COMMERCIAL

## 2024-03-20 DIAGNOSIS — C53.0 MALIGNANT NEOPLASM OF ENDOCERVIX (HCC): ICD-10-CM

## 2024-03-20 DIAGNOSIS — R10.2 PELVIC PAIN: ICD-10-CM

## 2024-03-20 PROCEDURE — 74177 CT ABD & PELVIS W/CONTRAST: CPT

## 2024-03-20 PROCEDURE — 6360000004 HC RX CONTRAST MEDICATION: Performed by: PHYSICIAN ASSISTANT

## 2024-03-20 PROCEDURE — 2500000003 HC RX 250 WO HCPCS: Performed by: PHYSICIAN ASSISTANT

## 2024-03-20 RX ADMIN — IOPAMIDOL 75 ML: 755 INJECTION, SOLUTION INTRAVENOUS at 08:45

## 2024-03-20 RX ADMIN — BARIUM SULFATE 450 ML: 20 SUSPENSION ORAL at 08:45

## 2024-03-20 NOTE — RESULT ENCOUNTER NOTE
Patient returned call, results and instructions given.  Patient states she still is having nausea and pelvic pain, denies any other symptoms.  Informed patient that PA will be updated and she will be updated with any further orders / instructions.  Patient voiced understanding and appreciation.

## 2024-03-20 NOTE — RESULT ENCOUNTER NOTE
Returned call to patient and we discussed her concerns  She reports continuation of intermittent, sharp, left sided pelvic pain  Pain is sporadic and may occur at rest, denies pain with lifting or movement  Additional symptoms of concern are unexplained nausea, unrelated to abdominal pain, occasionally post prandial. She has not tried any antiemetic measures.   She denies changes since most recent physical exam, specifically no vaginal bleeding or discharge. No new lumps or bumps.    Recommended referral to Gastroenterology for unexplained nausea and left lower quadrant pain.     Discussed imaging with pelvic US and PET/CT scan in light of her unexplained symptoms and history of endocervical adenocarcinoma and ovaries remain in situ to evaluate for recurrent disease processes.     Patient voiced understanding of care plan. She was given number to call and scheduling testing. Follow up on results accordingly.

## 2024-03-21 ENCOUNTER — TELEPHONE (OUTPATIENT)
Dept: GASTROENTEROLOGY | Age: 39
End: 2024-03-21

## 2024-03-21 NOTE — TELEPHONE ENCOUNTER
1st attempt writer left voicemail to schedule new patient appointment   2nd attempt letter sent to address in chart

## 2024-04-05 ENCOUNTER — APPOINTMENT (OUTPATIENT)
Dept: ULTRASOUND IMAGING | Age: 39
End: 2024-04-05
Payer: COMMERCIAL

## 2024-04-05 ENCOUNTER — HOSPITAL ENCOUNTER (OUTPATIENT)
Dept: NUCLEAR MEDICINE | Age: 39
End: 2024-04-05
Payer: COMMERCIAL

## 2024-04-05 ENCOUNTER — HOSPITAL ENCOUNTER (OUTPATIENT)
Dept: NUCLEAR MEDICINE | Age: 39
Discharge: HOME | End: 2024-04-05
Payer: COMMERCIAL

## 2024-04-05 ENCOUNTER — HOSPITAL ENCOUNTER (OUTPATIENT)
Dept: ULTRASOUND IMAGING | Age: 39
End: 2024-04-05
Payer: COMMERCIAL

## 2024-04-05 DIAGNOSIS — R10.2 PELVIC PAIN: ICD-10-CM

## 2024-04-05 DIAGNOSIS — C53.0 MALIGNANT NEOPLASM OF ENDOCERVIX (HCC): ICD-10-CM

## 2024-04-05 LAB — GLUCOSE BLD-MCNC: 83 MG/DL (ref 65–105)

## 2024-04-05 PROCEDURE — 76856 US EXAM PELVIC COMPLETE: CPT

## 2024-04-05 PROCEDURE — 3430000000 HC RX DIAGNOSTIC RADIOPHARMACEUTICAL: Performed by: PHYSICIAN ASSISTANT

## 2024-04-05 PROCEDURE — 82947 ASSAY GLUCOSE BLOOD QUANT: CPT

## 2024-04-05 PROCEDURE — 78815 PET IMAGE W/CT SKULL-THIGH: CPT

## 2024-04-05 PROCEDURE — A9609 HC RX DIAGNOSTIC RADIOPHARMACEUTICAL: HCPCS | Performed by: PHYSICIAN ASSISTANT

## 2024-04-05 PROCEDURE — 2580000003 HC RX 258: Performed by: PHYSICIAN ASSISTANT

## 2024-04-05 RX ORDER — SODIUM CHLORIDE 0.9 % (FLUSH) 0.9 %
10 SYRINGE (ML) INJECTION ONCE
Status: COMPLETED | OUTPATIENT
Start: 2024-04-05 | End: 2024-04-05

## 2024-04-05 RX ORDER — FLUDEOXYGLUCOSE F 18 200 MCI/ML
10 INJECTION, SOLUTION INTRAVENOUS
Status: COMPLETED | OUTPATIENT
Start: 2024-04-05 | End: 2024-04-05

## 2024-04-05 RX ADMIN — FLUDEOXYGLUCOSE F 18 10.58 MILLICURIE: 200 INJECTION, SOLUTION INTRAVENOUS at 12:51

## 2024-04-05 RX ADMIN — SODIUM CHLORIDE, PRESERVATIVE FREE 10 ML: 5 INJECTION INTRAVENOUS at 12:51

## 2024-04-09 ENCOUNTER — TELEPHONE (OUTPATIENT)
Dept: GYNECOLOGIC ONCOLOGY | Age: 39
End: 2024-04-09

## 2024-04-09 DIAGNOSIS — R59.0 LYMPHADENOPATHY, AXILLARY: Primary | ICD-10-CM

## 2024-04-09 DIAGNOSIS — N83.202 LEFT OVARIAN CYST: ICD-10-CM

## 2024-04-09 NOTE — TELEPHONE ENCOUNTER
Patient returned call and information provided.  Also informed patient that IR will call with appt.  Patient voiced understanding and appreciation.

## 2024-04-09 NOTE — TELEPHONE ENCOUNTER
Called patient to confirm what testing results patient inquiring to in message.  Patient clarified US / PET scan completed on 4/5/24.  Writer informed patient that CNP will be notified of request and will call back with results / instructions once completed. Patient voiced understanding and appreciation.

## 2024-04-09 NOTE — TELEPHONE ENCOUNTER
Called to inform patient that the mammogram will not be ordered at this time due to no clinical or imaging breast abnormalities.  Mammogram will be revisited if needed based on bx results.  No answer, LVM

## 2024-04-11 ENCOUNTER — TELEPHONE (OUTPATIENT)
Dept: GYNECOLOGIC ONCOLOGY | Age: 39
End: 2024-04-11

## 2024-04-11 NOTE — TELEPHONE ENCOUNTER
[facility] dept called stating PET on 4/5/24 was denied due to not medically necessary. P-to-P can be done [phone]. Case # [id number].

## 2024-04-12 ENCOUNTER — HOSPITAL ENCOUNTER (OUTPATIENT)
Age: 39
Discharge: HOME | End: 2024-04-12
Payer: COMMERCIAL

## 2024-04-12 ENCOUNTER — HOSPITAL ENCOUNTER (OUTPATIENT)
Dept: ULTRASOUND IMAGING | Age: 39
Discharge: HOME | End: 2024-04-12
Payer: COMMERCIAL

## 2024-04-12 DIAGNOSIS — N83.202 LEFT OVARIAN CYST: ICD-10-CM

## 2024-04-12 DIAGNOSIS — R59.0 LYMPHADENOPATHY, AXILLARY: ICD-10-CM

## 2024-04-12 LAB — CANCER AG125 SERPL-ACNC: 8 U/ML (ref 0–38)

## 2024-04-12 PROCEDURE — 88333 PATH CONSLTJ SURG CYTO XM 1: CPT

## 2024-04-12 PROCEDURE — 88369 M/PHMTRC ALYSISHQUANT/SEMIQ: CPT

## 2024-04-12 PROCEDURE — 36415 COLL VENOUS BLD VENIPUNCTURE: CPT

## 2024-04-12 PROCEDURE — 88368 INSITU HYBRIDIZATION MANUAL: CPT

## 2024-04-12 PROCEDURE — 76942 ECHO GUIDE FOR BIOPSY: CPT

## 2024-04-12 PROCEDURE — 86304 IMMUNOASSAY TUMOR CA 125: CPT

## 2024-04-12 PROCEDURE — 88341 IMHCHEM/IMCYTCHM EA ADD ANTB: CPT

## 2024-04-12 PROCEDURE — 88184 FLOWCYTOMETRY/ TC 1 MARKER: CPT

## 2024-04-12 PROCEDURE — 88185 FLOWCYTOMETRY/TC ADD-ON: CPT

## 2024-04-12 PROCEDURE — 88360 TUMOR IMMUNOHISTOCHEM/MANUAL: CPT

## 2024-04-12 PROCEDURE — 88334 PATH CONSLTJ SURG CYTO XM EA: CPT

## 2024-04-12 PROCEDURE — 88342 IMHCHEM/IMCYTCHM 1ST ANTB: CPT

## 2024-04-12 PROCEDURE — 88305 TISSUE EXAM BY PATHOLOGIST: CPT

## 2024-04-12 NOTE — BRIEF OP NOTE
Brief Postoperative Note    Kasey Davis  YOB: 1985  [id number]    Pre-operative Diagnosis: Right axillary lymphadenopathy    Post-operative Diagnosis: Same    Procedure: LN bx    Anesthesia: Local    Surgeons/Assistants: MD Dudley    Estimated Blood Loss: less than 50     Complications: None    Specimens: Was Obtained:     Findings: Successful bx of right axillary LN.  4 cores and 1 FNA sample obtained.    Electronically signed by EVE Nunes on 4/12/2024 at 3:27 PM

## 2024-04-12 NOTE — PROGRESS NOTES
Patient to US for right axillary lymph node biopsy.  Dr. Buckner and NICK HOPKINS at bedside.  Site prepped and draped, area numbed with lidocaine.  Multiple specimens obtained and given to pathology.  Access removed and band aid placed to site.  Patient tolerated well and is ambulatory from dept.  Ice pack given for comfort.

## 2024-04-14 ENCOUNTER — CLINICAL DOCUMENTATION (OUTPATIENT)
Dept: GYNECOLOGIC ONCOLOGY | Age: 39
End: 2024-04-14

## 2024-04-14 DIAGNOSIS — N83.202 CYST OF LEFT OVARY: Primary | ICD-10-CM

## 2024-04-16 LAB — SURGICAL PATHOLOGY REPORT: NORMAL

## 2024-04-18 DIAGNOSIS — R59.9 LYMPHOID HYPERPLASIA: Primary | ICD-10-CM

## 2024-04-18 LAB
FLOW CYTOMETRY SOURCE: NORMAL
FLOW CYTOMETRY, NODE/FLUID: NORMAL

## 2024-04-18 NOTE — RESULT ENCOUNTER NOTE
Patient returned call and given results and instructions.  Patient to call to see of can get in sooner with Dr. Rouse, and schedule US in 6 weeks.  Patient voiced understanding and appreciation.

## 2024-04-18 NOTE — RESULT ENCOUNTER NOTE
Please inform the patient regarding the normal benign lymph biopsy. Final pathology is consistent with a \"FOLLICULAR LYMPHOID HYPERPLASIA\", which is typically a benign condition. The source of this hyperplasia is unknown. Therefore, please help me refer the patient for Hematology / Oncology evaluation (Dr. Dylon Mueller).     I see that she has an appointment with Dr. Maurisio Rouse on 05/23/24. Is there a way we could expedite this appointment, given her symptoms?    I see that Denise Arrieta NP has recommended repeat Pelvic Ultrasound in 6 weeks, which is very reasonable.    Return for next visit with Denise Arrieta NP on 06/05/24 or sooner for worsening symptoms.    Thank you for your assistance,  Antonietta Hahn MD  Gynecologic Oncology

## 2024-04-19 ENCOUNTER — TELEPHONE (OUTPATIENT)
Dept: ADMINISTRATIVE | Age: 39
End: 2024-04-19

## 2024-04-19 NOTE — TELEPHONE ENCOUNTER
Patient called for a sooner appointment. The referring physican Romana Nelson would like her seen sooner  Can she please get a call for a sooner appt

## 2024-04-22 ENCOUNTER — TELEPHONE (OUTPATIENT)
Dept: ONCOLOGY | Age: 39
End: 2024-04-22

## 2024-04-22 ENCOUNTER — OFFICE VISIT (OUTPATIENT)
Dept: ONCOLOGY | Age: 39
End: 2024-04-22
Payer: COMMERCIAL

## 2024-04-22 VITALS
DIASTOLIC BLOOD PRESSURE: 75 MMHG | RESPIRATION RATE: 18 BRPM | WEIGHT: 138.4 LBS | SYSTOLIC BLOOD PRESSURE: 108 MMHG | TEMPERATURE: 97.2 F | OXYGEN SATURATION: 100 % | BODY MASS INDEX: 24.52 KG/M2 | HEART RATE: 81 BPM | HEIGHT: 63 IN

## 2024-04-22 DIAGNOSIS — R59.1 LYMPHADENOPATHY: ICD-10-CM

## 2024-04-22 DIAGNOSIS — C53.9 ADENOCARCINOMA OF CERVIX (HCC): ICD-10-CM

## 2024-04-22 DIAGNOSIS — C53.8 MALIGNANT NEOPLASM OF OVERLAPPING SITES OF CERVIX UTERI (HCC): Primary | ICD-10-CM

## 2024-04-22 PROCEDURE — G8420 CALC BMI NORM PARAMETERS: HCPCS | Performed by: INTERNAL MEDICINE

## 2024-04-22 PROCEDURE — 99214 OFFICE O/P EST MOD 30 MIN: CPT | Performed by: INTERNAL MEDICINE

## 2024-04-22 PROCEDURE — 1036F TOBACCO NON-USER: CPT | Performed by: INTERNAL MEDICINE

## 2024-04-22 PROCEDURE — G8427 DOCREV CUR MEDS BY ELIG CLIN: HCPCS | Performed by: INTERNAL MEDICINE

## 2024-04-22 PROCEDURE — 99211 OFF/OP EST MAY X REQ PHY/QHP: CPT | Performed by: INTERNAL MEDICINE

## 2024-04-22 RX ORDER — IBUPROFEN 200 MG
200 TABLET ORAL EVERY 6 HOURS PRN
COMMUNITY

## 2024-04-22 NOTE — TELEPHONE ENCOUNTER
Refer to Dr Street for Rt axillary LN biopsy  RV after surgery        Faxed referral over to Dr. Darby office to be scheduled. Did let patient know once ater surgery we will see her back in the office. Did give her Anna card let patient know she will help follow her and to make sure she gets a follow up appointment with Ervin.      Patient did receive avs

## 2024-04-29 ENCOUNTER — TELEPHONE (OUTPATIENT)
Dept: GYNECOLOGIC ONCOLOGY | Age: 39
End: 2024-04-29

## 2024-04-29 DIAGNOSIS — C53.0 MALIGNANT NEOPLASM OF ENDOCERVIX (HCC): Primary | ICD-10-CM

## 2024-04-29 NOTE — TELEPHONE ENCOUNTER
Was asked by CNP to fax over updated PET CT order to insurance.  Called [phone] and was informed fax number is [phone].  Updated order faxed to number provided.

## 2024-04-30 ENCOUNTER — TELEPHONE (OUTPATIENT)
Dept: GYNECOLOGIC ONCOLOGY | Age: 39
End: 2024-04-30

## 2024-04-30 NOTE — TELEPHONE ENCOUNTER
Gayla from [facility] Imaging Auth determination.  Called to say that a denial was issued for PET scan and an appeal will have to be made by provider. Provider may contact Appeals department at [phone] you may speak to anyone who answers the call.

## 2024-05-07 ENCOUNTER — HOSPITAL ENCOUNTER (OUTPATIENT)
Dept: WOMENS IMAGING | Age: 39
Discharge: HOME | End: 2024-05-09
Payer: COMMERCIAL

## 2024-05-07 DIAGNOSIS — R59.1 LYMPHADENOPATHY: ICD-10-CM

## 2024-05-07 PROCEDURE — 76882 US LMTD JT/FCL EVL NVASC XTR: CPT

## 2024-05-20 ENCOUNTER — HOSPITAL ENCOUNTER (OUTPATIENT)
Dept: ULTRASOUND IMAGING | Age: 39
Discharge: HOME | End: 2024-05-22
Payer: COMMERCIAL

## 2024-05-20 ENCOUNTER — TELEPHONE (OUTPATIENT)
Dept: GYNECOLOGIC ONCOLOGY | Age: 39
End: 2024-05-20

## 2024-05-20 DIAGNOSIS — N83.202 CYST OF LEFT OVARY: ICD-10-CM

## 2024-05-20 PROCEDURE — 76856 US EXAM PELVIC COMPLETE: CPT

## 2024-05-20 NOTE — TELEPHONE ENCOUNTER
Patient called to clarify reason for US.  Writer found US  being repeated to monitor ovarian cyst.

## 2024-05-21 ENCOUNTER — ANESTHESIA EVENT (OUTPATIENT)
Dept: OPERATING ROOM | Age: 39
End: 2024-05-21
Payer: COMMERCIAL

## 2024-05-21 ENCOUNTER — HOSPITAL ENCOUNTER (OUTPATIENT)
Age: 39
Setting detail: OUTPATIENT SURGERY
Discharge: HOME | End: 2024-05-21
Attending: SURGERY | Admitting: SURGERY
Payer: COMMERCIAL

## 2024-05-21 ENCOUNTER — ANESTHESIA (OUTPATIENT)
Dept: OPERATING ROOM | Age: 39
End: 2024-05-21
Payer: COMMERCIAL

## 2024-05-21 ENCOUNTER — HOSPITAL ENCOUNTER (OUTPATIENT)
Dept: NUCLEAR MEDICINE | Age: 39
Discharge: HOME | End: 2024-05-23
Payer: COMMERCIAL

## 2024-05-21 VITALS
RESPIRATION RATE: 11 BRPM | HEART RATE: 82 BPM | BODY MASS INDEX: 23.51 KG/M2 | OXYGEN SATURATION: 100 % | SYSTOLIC BLOOD PRESSURE: 96 MMHG | WEIGHT: 132.7 LBS | TEMPERATURE: 97.3 F | HEIGHT: 63 IN | DIASTOLIC BLOOD PRESSURE: 54 MMHG

## 2024-05-21 DIAGNOSIS — G89.18 ACUTE POSTOPERATIVE PAIN: Primary | ICD-10-CM

## 2024-05-21 DIAGNOSIS — R59.1 LYMPHADENOPATHY: ICD-10-CM

## 2024-05-21 PROCEDURE — 7100000000 HC PACU RECOVERY - FIRST 15 MIN: Performed by: SURGERY

## 2024-05-21 PROCEDURE — 6360000002 HC RX W HCPCS: Performed by: NURSE ANESTHETIST, CERTIFIED REGISTERED

## 2024-05-21 PROCEDURE — 2580000003 HC RX 258: Performed by: ANESTHESIOLOGY

## 2024-05-21 PROCEDURE — 3600000002 HC SURGERY LEVEL 2 BASE: Performed by: SURGERY

## 2024-05-21 PROCEDURE — 3430000000 HC RX DIAGNOSTIC RADIOPHARMACEUTICAL: Performed by: SURGERY

## 2024-05-21 PROCEDURE — 2500000003 HC RX 250 WO HCPCS: Performed by: NURSE ANESTHETIST, CERTIFIED REGISTERED

## 2024-05-21 PROCEDURE — 88342 IMHCHEM/IMCYTCHM 1ST ANTB: CPT

## 2024-05-21 PROCEDURE — 88341 IMHCHEM/IMCYTCHM EA ADD ANTB: CPT

## 2024-05-21 PROCEDURE — 88360 TUMOR IMMUNOHISTOCHEM/MANUAL: CPT

## 2024-05-21 PROCEDURE — A9520 TC99 TILMANOCEPT DIAG 0.5MCI: HCPCS | Performed by: SURGERY

## 2024-05-21 PROCEDURE — 3700000000 HC ANESTHESIA ATTENDED CARE: Performed by: SURGERY

## 2024-05-21 PROCEDURE — 6370000000 HC RX 637 (ALT 250 FOR IP): Performed by: SURGERY

## 2024-05-21 PROCEDURE — 2709999900 HC NON-CHARGEABLE SUPPLY: Performed by: SURGERY

## 2024-05-21 PROCEDURE — 3700000001 HC ADD 15 MINUTES (ANESTHESIA): Performed by: SURGERY

## 2024-05-21 PROCEDURE — 7100000011 HC PHASE II RECOVERY - ADDTL 15 MIN: Performed by: SURGERY

## 2024-05-21 PROCEDURE — 7100000001 HC PACU RECOVERY - ADDTL 15 MIN: Performed by: SURGERY

## 2024-05-21 PROCEDURE — 88305 TISSUE EXAM BY PATHOLOGIST: CPT

## 2024-05-21 PROCEDURE — 2580000003 HC RX 258: Performed by: NURSE ANESTHETIST, CERTIFIED REGISTERED

## 2024-05-21 PROCEDURE — 2500000003 HC RX 250 WO HCPCS: Performed by: SURGERY

## 2024-05-21 PROCEDURE — 78195 LYMPH SYSTEM IMAGING: CPT

## 2024-05-21 PROCEDURE — 7100000010 HC PHASE II RECOVERY - FIRST 15 MIN: Performed by: SURGERY

## 2024-05-21 PROCEDURE — 3600000012 HC SURGERY LEVEL 2 ADDTL 15MIN: Performed by: SURGERY

## 2024-05-21 PROCEDURE — 6360000002 HC RX W HCPCS: Performed by: SURGERY

## 2024-05-21 PROCEDURE — 6360000002 HC RX W HCPCS: Performed by: ANESTHESIOLOGY

## 2024-05-21 RX ORDER — DOCUSATE SODIUM 100 MG/1
100 CAPSULE, LIQUID FILLED ORAL 2 TIMES DAILY PRN
Qty: 60 CAPSULE | Refills: 0 | Status: SHIPPED | OUTPATIENT
Start: 2024-05-21 | End: 2024-06-20

## 2024-05-21 RX ORDER — SODIUM CHLORIDE 9 MG/ML
INJECTION, SOLUTION INTRAVENOUS PRN
Status: DISCONTINUED | OUTPATIENT
Start: 2024-05-21 | End: 2024-05-21 | Stop reason: HOSPADM

## 2024-05-21 RX ORDER — SODIUM CHLORIDE 0.9 % (FLUSH) 0.9 %
5-40 SYRINGE (ML) INJECTION EVERY 12 HOURS SCHEDULED
Status: DISCONTINUED | OUTPATIENT
Start: 2024-05-21 | End: 2024-05-21 | Stop reason: HOSPADM

## 2024-05-21 RX ORDER — DEXAMETHASONE SODIUM PHOSPHATE 10 MG/ML
INJECTION, SOLUTION INTRAMUSCULAR; INTRAVENOUS PRN
Status: DISCONTINUED | OUTPATIENT
Start: 2024-05-21 | End: 2024-05-21 | Stop reason: SDUPTHER

## 2024-05-21 RX ORDER — LIDOCAINE HYDROCHLORIDE 20 MG/ML
INJECTION, SOLUTION EPIDURAL; INFILTRATION; INTRACAUDAL; PERINEURAL PRN
Status: DISCONTINUED | OUTPATIENT
Start: 2024-05-21 | End: 2024-05-21 | Stop reason: SDUPTHER

## 2024-05-21 RX ORDER — SODIUM CHLORIDE, SODIUM LACTATE, POTASSIUM CHLORIDE, CALCIUM CHLORIDE 600; 310; 30; 20 MG/100ML; MG/100ML; MG/100ML; MG/100ML
INJECTION, SOLUTION INTRAVENOUS CONTINUOUS
Status: DISCONTINUED | OUTPATIENT
Start: 2024-05-21 | End: 2024-05-21 | Stop reason: HOSPADM

## 2024-05-21 RX ORDER — OXYCODONE HYDROCHLORIDE 5 MG/1
5 TABLET ORAL EVERY 6 HOURS PRN
Qty: 12 TABLET | Refills: 0 | Status: SHIPPED | OUTPATIENT
Start: 2024-05-21 | End: 2024-05-24

## 2024-05-21 RX ORDER — LIDOCAINE 40 MG/G
CREAM TOPICAL PRN
Status: DISCONTINUED | OUTPATIENT
Start: 2024-05-21 | End: 2024-05-21 | Stop reason: HOSPADM

## 2024-05-21 RX ORDER — MIDAZOLAM HYDROCHLORIDE 1 MG/ML
INJECTION INTRAMUSCULAR; INTRAVENOUS PRN
Status: DISCONTINUED | OUTPATIENT
Start: 2024-05-21 | End: 2024-05-21 | Stop reason: SDUPTHER

## 2024-05-21 RX ORDER — SODIUM CHLORIDE 9 MG/ML
INJECTION, SOLUTION INTRAVENOUS CONTINUOUS
Status: DISCONTINUED | OUTPATIENT
Start: 2024-05-21 | End: 2024-05-21 | Stop reason: HOSPADM

## 2024-05-21 RX ORDER — ONDANSETRON 2 MG/ML
INJECTION INTRAMUSCULAR; INTRAVENOUS PRN
Status: DISCONTINUED | OUTPATIENT
Start: 2024-05-21 | End: 2024-05-21 | Stop reason: SDUPTHER

## 2024-05-21 RX ORDER — LIDOCAINE HYDROCHLORIDE 10 MG/ML
1 INJECTION, SOLUTION EPIDURAL; INFILTRATION; INTRACAUDAL; PERINEURAL
Status: DISCONTINUED | OUTPATIENT
Start: 2024-05-21 | End: 2024-05-21 | Stop reason: HOSPADM

## 2024-05-21 RX ORDER — SODIUM CHLORIDE, SODIUM LACTATE, POTASSIUM CHLORIDE, CALCIUM CHLORIDE 600; 310; 30; 20 MG/100ML; MG/100ML; MG/100ML; MG/100ML
INJECTION, SOLUTION INTRAVENOUS CONTINUOUS PRN
Status: DISCONTINUED | OUTPATIENT
Start: 2024-05-21 | End: 2024-05-21 | Stop reason: SDUPTHER

## 2024-05-21 RX ORDER — SODIUM CHLORIDE 0.9 % (FLUSH) 0.9 %
5-40 SYRINGE (ML) INJECTION PRN
Status: DISCONTINUED | OUTPATIENT
Start: 2024-05-21 | End: 2024-05-21 | Stop reason: HOSPADM

## 2024-05-21 RX ORDER — APREPITANT 40 MG/1
80 CAPSULE ORAL ONCE
Status: COMPLETED | OUTPATIENT
Start: 2024-05-21 | End: 2024-05-21

## 2024-05-21 RX ORDER — BUPIVACAINE HYDROCHLORIDE AND EPINEPHRINE 5; 5 MG/ML; UG/ML
INJECTION, SOLUTION EPIDURAL; INTRACAUDAL; PERINEURAL PRN
Status: DISCONTINUED | OUTPATIENT
Start: 2024-05-21 | End: 2024-05-21 | Stop reason: ALTCHOICE

## 2024-05-21 RX ORDER — ONDANSETRON 4 MG/1
4 TABLET, FILM COATED ORAL EVERY 12 HOURS PRN
Qty: 6 TABLET | Refills: 0 | Status: SHIPPED | OUTPATIENT
Start: 2024-05-21 | End: 2024-05-24

## 2024-05-21 RX ORDER — PROPOFOL 10 MG/ML
INJECTION, EMULSION INTRAVENOUS PRN
Status: DISCONTINUED | OUTPATIENT
Start: 2024-05-21 | End: 2024-05-21 | Stop reason: SDUPTHER

## 2024-05-21 RX ORDER — FENTANYL CITRATE 50 UG/ML
INJECTION, SOLUTION INTRAMUSCULAR; INTRAVENOUS PRN
Status: DISCONTINUED | OUTPATIENT
Start: 2024-05-21 | End: 2024-05-21 | Stop reason: SDUPTHER

## 2024-05-21 RX ADMIN — LIDOCAINE 4%: 4 CREAM TOPICAL at 09:46

## 2024-05-21 RX ADMIN — SODIUM CHLORIDE, POTASSIUM CHLORIDE, SODIUM LACTATE AND CALCIUM CHLORIDE: 600; 310; 30; 20 INJECTION, SOLUTION INTRAVENOUS at 10:04

## 2024-05-21 RX ADMIN — LIDOCAINE HYDROCHLORIDE 60 MG: 20 INJECTION, SOLUTION EPIDURAL; INFILTRATION; INTRACAUDAL; PERINEURAL at 12:06

## 2024-05-21 RX ADMIN — PROPOFOL 200 MG: 10 INJECTION, EMULSION INTRAVENOUS at 12:06

## 2024-05-21 RX ADMIN — DEXAMETHASONE SODIUM PHOSPHATE 10 MG: 10 INJECTION, SOLUTION INTRAMUSCULAR; INTRAVENOUS at 12:15

## 2024-05-21 RX ADMIN — ONDANSETRON 4 MG: 2 INJECTION INTRAMUSCULAR; INTRAVENOUS at 12:55

## 2024-05-21 RX ADMIN — SODIUM CHLORIDE, POTASSIUM CHLORIDE, SODIUM LACTATE AND CALCIUM CHLORIDE: 600; 310; 30; 20 INJECTION, SOLUTION INTRAVENOUS at 12:00

## 2024-05-21 RX ADMIN — FENTANYL CITRATE 100 MCG: 50 INJECTION INTRAMUSCULAR; INTRAVENOUS at 12:06

## 2024-05-21 RX ADMIN — FENTANYL CITRATE 50 MCG: 50 INJECTION INTRAMUSCULAR; INTRAVENOUS at 12:21

## 2024-05-21 RX ADMIN — Medication 2000 MG: at 12:12

## 2024-05-21 RX ADMIN — APREPITANT 80 MG: 40 CAPSULE ORAL at 10:32

## 2024-05-21 RX ADMIN — TILMANOCEPT 600 MICRO CURIE: KIT at 11:04

## 2024-05-21 RX ADMIN — MIDAZOLAM 2 MG: 1 INJECTION INTRAMUSCULAR; INTRAVENOUS at 12:00

## 2024-05-21 ASSESSMENT — PAIN - FUNCTIONAL ASSESSMENT
PAIN_FUNCTIONAL_ASSESSMENT: FACE, LEGS, ACTIVITY, CRY, AND CONSOLABILITY (FLACC)
PAIN_FUNCTIONAL_ASSESSMENT: 0-10

## 2024-05-21 ASSESSMENT — PAIN SCALES - GENERAL: PAINLEVEL_OUTOF10: 0

## 2024-05-21 NOTE — ANESTHESIA PRE PROCEDURE
Department of Anesthesiology  Preprocedure Note       Name:  Kasey Davis   Age:  39 y.o.  :  1985                                          MRN:  [id number]         Date:  2024      Surgeon: Surgeon(s):  Lexi Street DO    Procedure: Procedure(s):  AXILLARY LYMPH SENTINEL  (@ 10:30 AM)NODE BIOPSY  DISSECTION    Medications prior to admission:   Prior to Admission medications    Medication Sig Start Date End Date Taking? Authorizing Provider   ibuprofen (ADVIL;MOTRIN) 200 MG tablet Take 1 tablet by mouth every 6 hours as needed    ProviderMalaika MD   ACTEMRA 162 MG/0.9ML SOSY injection Inject 0.9 mLs into the skin every 14 days 23   Malaika Sloan MD   leucovorin calcium (WELLCOVORIN) 5 MG tablet  23   Malaika Sloan MD   amLODIPine (NORVASC) 5 MG tablet Take 1 tablet by mouth daily 22   Malaika Sloan MD   methotrexate (RHEUMATREX) 2.5 MG chemo tablet Take by mouth once a week 20 mg 1x a week. 22   Malaika Sloan MD       Current medications:    Current Facility-Administered Medications   Medication Dose Route Frequency Provider Last Rate Last Admin   • lidocaine PF 1 % injection 1 mL  1 mL IntraDERmal Once PRN Juan Mccoy DO       • 0.9 % sodium chloride infusion   IntraVENous Continuous Juan Mccoy DO       • lactated ringers IV soln infusion   IntraVENous Continuous Juan Mccoy  mL/hr at 24 1004 New Bag at 24 1004   • sodium chloride flush 0.9 % injection 5-40 mL  5-40 mL IntraVENous 2 times per day Juan Mccoy DO       • sodium chloride flush 0.9 % injection 5-40 mL  5-40 mL IntraVENous PRN Juan Mccoy DO       • 0.9 % sodium chloride infusion   IntraVENous PRN Juan Mccoy DO       • lidocaine (LMX) 4 % cream   Topical PRN Lexi Street DO   Given at 24 0946       Allergies:    Allergies   Allergen Reactions   • Clindamycin Nausea And Vomiting     Other

## 2024-05-21 NOTE — H&P
Interval H&P Note    Pt Name: Kasey Davis  MRN: [id number]  YOB: 1985  Date of evaluation: 5/21/2024      [x] I have reviewed in [facility] the General Surgery Note by Dr Lexi Street dated 4/29/24 attached below for the Interval History and Physical note.     [x] I have examined  Kasey Davis, a 39 y.o. female with RA, hypoactive thyroid and raynaud's dx managed by PCP and specialists who arrived for the scheduled  AXILLARY LYMPH SENTINEL  (@ 10:30 AM)NODE BIOPSY  DISSECTION by Lexi Street DO for Lymphadenopathy. The patient denies new health changes, fever, chills, wheezing, cough, increased SOB, chest pain, open sores or wounds.    Vital signs: /73   Pulse 91   Temp 97.5 °F (36.4 °C)   Resp 14   Ht 1.6 m (5' 3\")   Wt 60.2 kg (132 lb 11.2 oz)   LMP 07/14/2023 (Approximate)   SpO2 98%   BMI 23.51 kg/m²     Allergies:  Clindamycin    Medications:    Prior to Admission medications    Medication Sig Start Date End Date Taking? Authorizing Provider   ibuprofen (ADVIL;MOTRIN) 200 MG tablet Take 1 tablet by mouth every 6 hours as needed    Malaika Sloan MD   ACTEMRA 162 MG/0.9ML SOSY injection Inject 0.9 mLs into the skin every 14 days 9/11/23   Malaika Sloan MD   leucovorin calcium (WELLCOVORIN) 5 MG tablet  6/12/23   Malaika Sloan MD   amLODIPine (NORVASC) 5 MG tablet Take 1 tablet by mouth daily 2/18/22   Malaika Sloan MD   methotrexate (RHEUMATREX) 2.5 MG chemo tablet Take by mouth once a week 20 mg 1x a week. 2/12/22   Malaika Sloan MD         This is a 39 y.o. female who is pleasant, cooperative, alert and oriented x3, in no acute distress. Glasses     Heart: Heart sounds are normal.  HR 91 regular rate and rhythm without murmur, gallop or rub.   Lungs: SpO2 98% Normal respiratory effort with equal expansion, good air exchange, unlabored and clear to auscultation without wheezes or rales bilaterally   Abdomen: soft, nontender,

## 2024-05-21 NOTE — OP NOTE
Operative Note      Patient: Kasey Davis  YOB: 1985  MRN: [id number]    Date of Procedure: 5/21/2024    Pre-Op Diagnosis Codes:     * Lymphadenopathy [R59.1]    Post-Op Diagnosis: Same       Procedure: Excisional biopsy of axillary nodes from right axilla with technetium injection    Surgeon(s):  Lexi Street DO    Assistant:   Resident: Elisabeth Florez DO    Anesthesia: General    Estimated Blood Loss (mL): 5cc    Complications: None    Specimens:   ID Type Source Tests Collected by Time Destination   A : RIGHT AXILLARY SENTINEL NODE Tissue Axillary SURGICAL PATHOLOGY Lexi Street DO 5/21/2024 1244        Implants:  * No implants in log *      Drains: * No LDAs found *    Findings:  Infection Present At Time Of Surgery (PATOS) (choose all levels that have infection present):  No infection present  Other Findings: Two axillary lymph nodes with the highest radioactivity were obtained using the lexi-probe, along with one large palpable axillary lymph node   This procedure was not performed to treat breast cancer through sentinel node biopsy    HISTORY: The patient is a 39 y.o. year old female with history of above preop diagnosis.  The risk, benefits, expected outcome, and alternatives to the procedure were explained to the patient's understanding and written informed consent was obtained. Radionuclide injection was performed per Radiology department prior to procedure.     Detailed Description of Procedure:   The patient was brought to the operating suite, was transferred to the operating table in supine position. Timeout was performed verifying correct patient, position, equipment and procedure to be performed. EPC cuffs were applied. Preoperative antibiotics were infused. General anesthesia was administered, endotracheal intubation was performed. Technetium injection in the periareolar area had been given to facilitate identification of the nodes.    The right axilla was prepped and

## 2024-05-21 NOTE — BRIEF OP NOTE
Brief Postoperative Note      Patient: Kasey Davis  YOB: 1985  MRN: [id number]    Date of Procedure: 5/21/2024    Pre-Op Diagnosis Codes:     * Lymphadenopathy [R59.1]    Post-Op Diagnosis: Same       Procedure: Excisional biopsy of axillary nodes right axilla with technetium injection    Surgeon(s):  Lexi Street DO    Assistant:  Resident: Elisabeth Florez DO    Anesthesia: General    Estimated Blood Loss (mL): 5cc    Complications: None    Specimens:   ID Type Source Tests Collected by Time Destination   A : RIGHT AXILLARY SENTINEL NODE Tissue Axillary SURGICAL PATHOLOGY Lexi Street DO 5/21/2024 1244        Implants:  * No implants in log *      Drains: * No LDAs found *    Findings:  Infection Present At Time Of Surgery (PATOS) (choose all levels that have infection present):  No infection present  Other Findings: Two axillary lymph nodes with the highest radioactivity were obtained using the lexi-probe, along with one large palpable axillary lymph node  This procedure was not performed to treat breast cancer through sentinel node biopsy    Electronically signed by Elisabeth Florez DO on 5/21/2024 at 1:03 PM

## 2024-05-21 NOTE — DISCHARGE INSTRUCTIONS
General Surgery Patient Discharge Instructions    Operations performed:  right axillary lymph node excision    Discharge Date:  5/21/2024  Discharged To:  home    WOUND CARE:   Do not remove top dressing for 24 hrs.    Skin glue used, do not peel off, allow to fall off naturally.   Stitches under the skin were used to close the incision. These will dissolve over time.  Ok to ice the surgical site for any discomfort.    BATHING:  Ok to shower in 24 hrs.   -No bath tubs, soaks, hot tubs, or swimming until cleared at post-operative office visit.    ACTIVITY:   DRIVING: No driving or operating heavy machinery while on narcotic pain medication    WALKING:  Yes    LIFTING:   - Avoid heavy lifting, pushing, pulling, and/or strenuous activity or exercise until cleared at post-operative office visit.  -Avoid any activity that can cause trauma to the surgical site.    DIET:   Ok to resume regular diet.     Discharge Medications:  Alternate between Tylenol and Ibuprofen every 3 hours for pain control. Take Roxicodone for any severe break through pain. Take Colace, the stool softener, while taking Roxicodone.    SPECIAL INSTRUCTIONS:   Call the [facility] office at [phone]  if you have a fever > 100 F, or if your incision becomes red, tender, or drains more than a small amount of clear fluid.    FOLLOW UP:  Call the [facility] office at [phone] for follow up appointment with Dr. Street in 7-10 days

## 2024-05-24 LAB — SURGICAL PATHOLOGY REPORT: NORMAL

## 2024-06-07 ENCOUNTER — HOSPITAL ENCOUNTER (OUTPATIENT)
Age: 39
Discharge: HOME | End: 2024-06-07
Payer: COMMERCIAL

## 2024-06-07 ENCOUNTER — OFFICE VISIT (OUTPATIENT)
Dept: GYNECOLOGIC ONCOLOGY | Age: 39
End: 2024-06-07
Payer: COMMERCIAL

## 2024-06-07 VITALS
DIASTOLIC BLOOD PRESSURE: 79 MMHG | BODY MASS INDEX: 23.92 KG/M2 | HEIGHT: 63 IN | WEIGHT: 135 LBS | HEART RATE: 95 BPM | SYSTOLIC BLOOD PRESSURE: 110 MMHG | OXYGEN SATURATION: 100 %

## 2024-06-07 DIAGNOSIS — A64 STI (SEXUALLY TRANSMITTED INFECTION): ICD-10-CM

## 2024-06-07 DIAGNOSIS — A64 STI (SEXUALLY TRANSMITTED INFECTION): Primary | ICD-10-CM

## 2024-06-07 LAB
C TRACH DNA SPEC QL PROBE+SIG AMP: NORMAL
HCV AB SERPL QL IA: NONREACTIVE
HIV 1+2 AB+HIV1 P24 AG SERPL QL IA: NONREACTIVE
N GONORRHOEA DNA SPEC QL PROBE+SIG AMP: NORMAL
SPECIMEN DESCRIPTION: NORMAL
T PALLIDUM AB SER QL IA: NONREACTIVE

## 2024-06-07 PROCEDURE — 86696 HERPES SIMPLEX TYPE 2 TEST: CPT

## 2024-06-07 PROCEDURE — 86695 HERPES SIMPLEX TYPE 1 TEST: CPT

## 2024-06-07 PROCEDURE — 87660 TRICHOMONAS VAGIN DIR PROBE: CPT

## 2024-06-07 PROCEDURE — 87510 GARDNER VAG DNA DIR PROBE: CPT

## 2024-06-07 PROCEDURE — 1036F TOBACCO NON-USER: CPT | Performed by: NURSE PRACTITIONER

## 2024-06-07 PROCEDURE — G8427 DOCREV CUR MEDS BY ELIG CLIN: HCPCS | Performed by: NURSE PRACTITIONER

## 2024-06-07 PROCEDURE — 87480 CANDIDA DNA DIR PROBE: CPT

## 2024-06-07 PROCEDURE — 36415 COLL VENOUS BLD VENIPUNCTURE: CPT

## 2024-06-07 PROCEDURE — 87591 N.GONORRHOEAE DNA AMP PROB: CPT

## 2024-06-07 PROCEDURE — 99213 OFFICE O/P EST LOW 20 MIN: CPT | Performed by: NURSE PRACTITIONER

## 2024-06-07 PROCEDURE — G8420 CALC BMI NORM PARAMETERS: HCPCS | Performed by: NURSE PRACTITIONER

## 2024-06-07 PROCEDURE — 86803 HEPATITIS C AB TEST: CPT

## 2024-06-07 PROCEDURE — 86694 HERPES SIMPLEX NES ANTBDY: CPT

## 2024-06-07 PROCEDURE — 87389 HIV-1 AG W/HIV-1&-2 AB AG IA: CPT

## 2024-06-07 PROCEDURE — 86780 TREPONEMA PALLIDUM: CPT

## 2024-06-07 PROCEDURE — 87491 CHLMYD TRACH DNA AMP PROBE: CPT

## 2024-06-07 RX ORDER — LEFLUNOMIDE 20 MG/1
20 TABLET ORAL DAILY
COMMUNITY

## 2024-06-07 RX ORDER — DULOXETIN HYDROCHLORIDE 30 MG/1
30 CAPSULE, DELAYED RELEASE ORAL DAILY
COMMUNITY

## 2024-06-07 NOTE — PROGRESS NOTES
[facility] Gynecologic Oncology  [address], [facility] [address], [address]  [place] [facility] [address]    Kasey Davis , a 38 y/o [id number] single  female who presents for her cervical cancer surveillance visit.     CC/HPI: She has a history of  1B3 endocervical adenocarcinoma grade 1.   Patient is a pleasant 38-year-old premenopausal female who was referred to [facility] gynecologic oncology for findings of adenocarcinoma of the cervix based on multiple cervical biopsies in 2023.     Of note the patient did have a history of previous cervical dysplasia which she underwent a LEEP procedure back in .  She has had follow-up serial Pap smears which have been within normal findings until the most recent abnormalities in 2023.     Patient's Pap smear in 2023 was negative for intraepithelial lesion or malignancy however HPV 16 positive.  And she returned for a colposcopy evaluation in 2023 with multiple cervical biopsies consistent with adenocarcinoma of endocervical origin.     Patient was seen in [facility] gynecologic oncology for further evaluation and treatment recommendations.  Patient's clinical examination was consistent with approximately 4.5 cm raised lesion at the exocervix on her bimanual examination the cervix was freely mobile and large and barrel-shaped.  She felt to be a clinical stage IIb disease.      PET CT scan was completed on 2023 which revealed a uptake in the cervical region and endometrial cavity.  There were scattered areas in the vagina which were nonspecific there is no pelvic lymphadenopathy or distant metastatic disease.     Patient completed MRI pelvis for further evaluation on 2023 revealed the cervical mass measuring 3.3 x 1.6 cm.  There is no enlarged or suspicious lymph nodes.     The patient was then reexamined clinically and felt the patient could proceed with a radical hysterectomy approach. Patient wished to preserve her ovaries.     She ultimately

## 2024-06-07 NOTE — PROGRESS NOTES
Review of Systems   Respiratory:  Positive for shortness of breath (a little).    Gastrointestinal:  Positive for abdominal pain and constipation.   Endocrine: Negative.    Genitourinary: Negative.     Musculoskeletal: Negative.    Skin: Negative.    Neurological: Negative.    Hematological:  Bruises/bleeds easily.

## 2024-06-08 LAB
CANDIDA SPECIES: NEGATIVE
GARDNERELLA VAGINALIS: NEGATIVE
SOURCE: NORMAL
TRICHOMONAS: NEGATIVE

## 2024-06-10 LAB
C TRACH DNA SPEC QL PROBE+SIG AMP: NEGATIVE
N GONORRHOEA DNA SPEC QL PROBE+SIG AMP: NEGATIVE
SPECIMEN DESCRIPTION: NORMAL

## 2024-06-11 LAB
HSV1 IGG SERPL QL IA: 5.97
HSV1+2 IGM SER QL IA: 0.83
HSV2 AB SER QL IA: 0.18

## 2024-08-19 ENCOUNTER — TELEPHONE (OUTPATIENT)
Dept: GASTROENTEROLOGY | Age: 39
End: 2024-08-19

## 2024-08-19 NOTE — TELEPHONE ENCOUNTER
Writer called pt to reschedule missed appt on 8/15 no answer left v/m for pt to call back if wanting to reschedule

## 2024-09-12 ENCOUNTER — OFFICE VISIT (OUTPATIENT)
Dept: GYNECOLOGIC ONCOLOGY | Age: 39
End: 2024-09-12
Payer: COMMERCIAL

## 2024-09-12 ENCOUNTER — HOSPITAL ENCOUNTER (OUTPATIENT)
Age: 39
Setting detail: SPECIMEN
Discharge: HOME | End: 2024-09-12

## 2024-09-12 VITALS
OXYGEN SATURATION: 96 % | SYSTOLIC BLOOD PRESSURE: 116 MMHG | BODY MASS INDEX: 22.32 KG/M2 | DIASTOLIC BLOOD PRESSURE: 81 MMHG | HEART RATE: 100 BPM | WEIGHT: 126 LBS

## 2024-09-12 DIAGNOSIS — C53.0 MALIGNANT NEOPLASM OF ENDOCERVIX (HCC): Primary | ICD-10-CM

## 2024-09-12 PROCEDURE — G8427 DOCREV CUR MEDS BY ELIG CLIN: HCPCS | Performed by: PHYSICIAN ASSISTANT

## 2024-09-12 PROCEDURE — 99214 OFFICE O/P EST MOD 30 MIN: CPT | Performed by: PHYSICIAN ASSISTANT

## 2024-09-12 PROCEDURE — G8420 CALC BMI NORM PARAMETERS: HCPCS | Performed by: PHYSICIAN ASSISTANT

## 2024-09-12 PROCEDURE — 1036F TOBACCO NON-USER: CPT | Performed by: PHYSICIAN ASSISTANT

## 2024-09-12 RX ORDER — TOFACITINIB 11 MG/1
11 TABLET, FILM COATED, EXTENDED RELEASE ORAL DAILY
COMMUNITY
Start: 2024-06-20

## 2024-09-12 ASSESSMENT — ENCOUNTER SYMPTOMS
GASTROINTESTINAL NEGATIVE: 1
RESPIRATORY NEGATIVE: 1

## 2024-09-23 LAB — CYTOLOGY REPORT: NORMAL

## 2024-09-30 ENCOUNTER — TELEPHONE (OUTPATIENT)
Dept: GYNECOLOGIC ONCOLOGY | Age: 39
End: 2024-09-30

## 2024-09-30 NOTE — TELEPHONE ENCOUNTER
Spoke with patient regarding GI referral for nausea.  Patient states it was not completed due to insurance issues, but now she does not feel she needs it due to no longer having symptoms.  Writer informed patient that referral will be closed due to patient stating no longer needed but to call office if symptoms recur, or can be discussed at next follow up visit.  Patient voiced understanding and appreciation.

## 2024-12-12 ENCOUNTER — OFFICE VISIT (OUTPATIENT)
Dept: GYNECOLOGIC ONCOLOGY | Age: 39
End: 2024-12-12
Payer: COMMERCIAL

## 2024-12-12 VITALS
SYSTOLIC BLOOD PRESSURE: 121 MMHG | DIASTOLIC BLOOD PRESSURE: 75 MMHG | WEIGHT: 129 LBS | OXYGEN SATURATION: 100 % | HEIGHT: 63 IN | HEART RATE: 105 BPM | BODY MASS INDEX: 22.86 KG/M2

## 2024-12-12 DIAGNOSIS — C53.0 MALIGNANT NEOPLASM OF ENDOCERVIX (HCC): Primary | ICD-10-CM

## 2024-12-12 PROCEDURE — G8484 FLU IMMUNIZE NO ADMIN: HCPCS | Performed by: PHYSICIAN ASSISTANT

## 2024-12-12 PROCEDURE — 99214 OFFICE O/P EST MOD 30 MIN: CPT | Performed by: PHYSICIAN ASSISTANT

## 2024-12-12 PROCEDURE — G8420 CALC BMI NORM PARAMETERS: HCPCS | Performed by: PHYSICIAN ASSISTANT

## 2024-12-12 PROCEDURE — G8427 DOCREV CUR MEDS BY ELIG CLIN: HCPCS | Performed by: PHYSICIAN ASSISTANT

## 2024-12-12 PROCEDURE — 1036F TOBACCO NON-USER: CPT | Performed by: PHYSICIAN ASSISTANT

## 2024-12-12 ASSESSMENT — ENCOUNTER SYMPTOMS
RESPIRATORY NEGATIVE: 1
EYES NEGATIVE: 1
GASTROINTESTINAL NEGATIVE: 1

## 2024-12-12 NOTE — PROGRESS NOTES
Review of Systems   Constitutional: Negative.    HENT:   Positive for nosebleeds (dry).    Eyes: Negative.    Respiratory: Negative.     Cardiovascular: Negative.    Gastrointestinal: Negative.    Endocrine: Negative.    Genitourinary:  Positive for frequency.    Musculoskeletal: Negative.    Skin: Negative.    Neurological: Negative.    Hematological:  Bruises/bleeds easily.

## 2024-12-12 NOTE — PROGRESS NOTES
[facility] Gynecologic Oncology  [address] [facility] [address], [address]  [place] [facility] [address]    Kasey Davis present for her cervical cancer surveillance visit.     CC/HPI:  Patient is a pleasant 39 y.o. premenopausal female who was referred to [facility] gynecologic oncology for findings of adenocarcinoma of the cervix based on multiple cervical biopsies in June 2023.    Of note the patient did have a history of previous cervical dysplasia which she underwent a LEEP procedure back in 2010.  She has had follow-up serial Pap smears which have been within normal findings until the most recent abnormalities in June 2023.    Patient's Pap smear in April 2023 was negative for intraepithelial lesion or malignancy however HPV 16 positive.  And she returned for a colposcopy evaluation in June 2023 with multiple cervical biopsies consistent with adenocarcinoma of endocervical origin.    Patient was seen in [facility] gynecologic oncology for further evaluation and treatment recommendations.  Patient's clinical examination was consistent with approximately 4.5 cm raised lesion at the exocervix on her bimanual examination the cervix was freely mobile and large and barrel-shaped.  She felt to be a clinical stage IIb disease.     PET CT scan was completed on June 20, 2023 which revealed a uptake in the cervical region and endometrial cavity.  There were scattered areas in the vagina which were nonspecific there is no pelvic lymphadenopathy or distant metastatic disease.    Patient completed MRI pelvis for further evaluation on June 29, 2023 revealed the cervical mass measuring 3.3 x 1.6 cm.  There is no enlarged or suspicious lymph nodes.    The patient was then reexamined clinically and felt the patient could proceed with a radical hysterectomy approach. Patient wished to preserve her ovaries.    She underwent a ICG injection, sentinel lymph node mapping and robotic right pelvic sentinel lymph node biopsies and right periaortic

## 2025-03-12 ENCOUNTER — OFFICE VISIT (OUTPATIENT)
Dept: GYNECOLOGIC ONCOLOGY | Age: 40
End: 2025-03-12
Payer: COMMERCIAL

## 2025-03-12 ENCOUNTER — HOSPITAL ENCOUNTER (OUTPATIENT)
Age: 40
Setting detail: SPECIMEN
Discharge: HOME | End: 2025-03-12

## 2025-03-12 VITALS
SYSTOLIC BLOOD PRESSURE: 111 MMHG | BODY MASS INDEX: 21.93 KG/M2 | HEIGHT: 63 IN | HEART RATE: 85 BPM | WEIGHT: 123.8 LBS | DIASTOLIC BLOOD PRESSURE: 75 MMHG

## 2025-03-12 DIAGNOSIS — Z12.89 ENCOUNTER FOR PELVIC SCREENING FOR CANCER: ICD-10-CM

## 2025-03-12 DIAGNOSIS — C53.0 MALIGNANT NEOPLASM OF ENDOCERVIX (HCC): Primary | ICD-10-CM

## 2025-03-12 PROCEDURE — 99214 OFFICE O/P EST MOD 30 MIN: CPT | Performed by: PHYSICIAN ASSISTANT

## 2025-03-12 PROCEDURE — G8420 CALC BMI NORM PARAMETERS: HCPCS | Performed by: PHYSICIAN ASSISTANT

## 2025-03-12 PROCEDURE — G8427 DOCREV CUR MEDS BY ELIG CLIN: HCPCS | Performed by: PHYSICIAN ASSISTANT

## 2025-03-12 PROCEDURE — 1036F TOBACCO NON-USER: CPT | Performed by: PHYSICIAN ASSISTANT

## 2025-03-12 ASSESSMENT — ENCOUNTER SYMPTOMS
GASTROINTESTINAL NEGATIVE: 1
EYES NEGATIVE: 1
RESPIRATORY NEGATIVE: 1

## 2025-03-12 NOTE — PROGRESS NOTES
Review of Systems   Constitutional: Negative.    HENT:  Negative.     Eyes: Negative.    Respiratory: Negative.     Cardiovascular: Negative.    Gastrointestinal: Negative.    Endocrine: Negative.    Genitourinary: Negative.     Musculoskeletal: Negative.    Skin: Negative.    Neurological: Negative.    Hematological:  Bruises/bleeds easily.      
ultrasound on 5/20/2024 revealed resolution of the left ovarian cyst.    Today, she reports to be feeling well.  She is busy with her daughters activities and work. She has no abdominal or pelvic pain.  No vaginal bleeding or discharge.  No pain or bleeding with intercourse.  Appetite is stable, no nausea or vomiting.  No new lumps or bumps.  Her previous urinary frequency has improved.  Normal bowel movements.    ROS:  I have personally reviewed and agree with the review of systems done by my ancillary staff in the EPIC documentation.      Physical Exam:    Vitals:    03/12/25 1000   BP: 111/75   BP Site: Left Upper Arm   Patient Position: Sitting   BP Cuff Size: Medium Adult   Pulse: 85   Weight: 56.2 kg (123 lb 12.8 oz)   Height: 1.6 m (5' 3\")       General: well- appearing, no acute distress, alert and oriented x 3    HEENT: Thyroid normal size. No cervical or supraclavicular lymphadenopathy.    Lungs: Clear to auscultate bilaterally to the bases    No CVA tenderness present bilaterally.    Heart: Auscultation reveals regular rate and rhythm. No murmurs or gallops appreciated.    Abdomen: Flat, soft. Midline laparotomy scar present.  Abdomen is nontender throughout. No palpable masses or ascites. No inguinal lymphadenopathy bilaterally.    Extremities: No edema, calf tenderness or deformities bilaterally    Pelvic: The patient has normal female external genitalia including, vulva, urethra, vagina, perineum, Bartholin glands.     A medium speculum examination reveals no blood in vaginal vault. Vaginal cuff well-intact with no signs of erythema, induration, separation, or granulation tissue.    Mild white discharge present. No odor. Pap smear obtained. Patient tolerated without incident.    Bimanual examination: No palpable vaginal nodules and no other pelvic or adnexal masses, tenderness or pathology noted.      Assessment   Cancer Staging   FIGO IB grade 1 endocervical adenocarcinoma s/p radical hysterectomy,

## 2025-03-21 LAB — CYTOLOGY REPORT: NORMAL

## 2025-03-24 ENCOUNTER — RESULTS FOLLOW-UP (OUTPATIENT)
Dept: LAB | Age: 40
End: 2025-03-24

## 2025-06-13 NOTE — TELEPHONE ENCOUNTER
Pt notified that [employer] short term disability form has been completed, signed, and faxed. Pt verbalized understanding. See full operative report

## 2025-06-19 ENCOUNTER — OFFICE VISIT (OUTPATIENT)
Dept: GYNECOLOGIC ONCOLOGY | Age: 40
End: 2025-06-19

## 2025-06-19 VITALS
WEIGHT: 121.2 LBS | HEIGHT: 63 IN | DIASTOLIC BLOOD PRESSURE: 83 MMHG | SYSTOLIC BLOOD PRESSURE: 115 MMHG | BODY MASS INDEX: 21.48 KG/M2

## 2025-06-19 DIAGNOSIS — Z85.41 HISTORY OF CERVICAL CANCER: Primary | ICD-10-CM

## 2025-06-19 ASSESSMENT — ENCOUNTER SYMPTOMS
EYES NEGATIVE: 1
GASTROINTESTINAL NEGATIVE: 1
RESPIRATORY NEGATIVE: 1

## 2025-06-19 NOTE — PROGRESS NOTES
[facility] Gynecologic Oncology  [address] [facility], [facility] [address], [address]  [facility] [facility] [place] [facility] [address]    Kasey Davis present for her cervical cancer surveillance visit.     CC/HPI:  Patient is a pleasant 40 y.o. premenopausal female who was referred to [facility] gynecologic oncology for findings of adenocarcinoma of the cervix based on multiple cervical biopsies in June 2023.    Of note the patient did have a history of previous cervical dysplasia which she underwent a LEEP procedure back in 2010.  She has had follow-up serial Pap smears which have been within normal findings until the most recent abnormalities in June 2023.    Patient's Pap smear in April 2023 was negative for intraepithelial lesion or malignancy however HPV 16 positive.  And she returned for a colposcopy evaluation in June 2023 with multiple cervical biopsies consistent with adenocarcinoma of endocervical origin.    Patient was seen in [facility] gynecologic oncology for further evaluation and treatment recommendations.  Patient's clinical examination was consistent with approximately 4.5 cm raised lesion at the exocervix on her bimanual examination the cervix was freely mobile and large and barrel-shaped.  She felt to be a clinical stage IIb disease.     PET CT scan was completed on June 20, 2023 which revealed a uptake in the cervical region and endometrial cavity.  There were scattered areas in the vagina which were nonspecific there is no pelvic lymphadenopathy or distant metastatic disease.    Patient completed MRI pelvis for further evaluation on June 29, 2023 revealed the cervical mass measuring 3.3 x 1.6 cm.  There is no enlarged or suspicious lymph nodes.    The patient was then reexamined clinically and felt the patient could proceed with a radical hysterectomy approach. Patient wished to preserve her ovaries.    She underwent a ICG injection, sentinel lymph node mapping and robotic right pelvic sentinel lymph node biopsies and right periaortic

## 2025-06-19 NOTE — PROGRESS NOTES
Review of Systems   Constitutional: Negative.    HENT:  Negative.     Eyes: Negative.    Respiratory: Negative.     Cardiovascular: Negative.    Gastrointestinal: Negative.    Endocrine: Positive for hot flashes (mild).   Genitourinary: Negative.     Musculoskeletal: Negative.    Skin: Negative.    Neurological: Negative.    Hematological:  Bruises/bleeds easily.

## (undated) DEVICE — APPLIER CLP L L13IN TI MULT RNG HNDL 20 CLP STR LIGACLP

## (undated) DEVICE — SUTURE VICRYL + SZ 3-0 L27IN ABSRB UD L26MM SH 1/2 CIR VCP416H

## (undated) DEVICE — LEGGINGS, PAIR, 31X48, STERILE: Brand: MEDLINE

## (undated) DEVICE — SUTURE VCRL SZ 3-0 L18IN ABSRB UD W/O NDL POLYGLACTIN 910 J110T

## (undated) DEVICE — SCISSOR SURG METZ CRV TIP

## (undated) DEVICE — COVER,LIGHT HANDLE,FLX,2/PK: Brand: MEDLINE INDUSTRIES, INC.

## (undated) DEVICE — STRAP,POSITIONING,KNEE/BODY,FOAM,4X60": Brand: MEDLINE

## (undated) DEVICE — SEALANT TISS 10 CC FIBRIN VISTASEAL

## (undated) DEVICE — NEEDLE SPINAL 22GA L3.5IN SPINOCAN

## (undated) DEVICE — SYRINGE IRRIG 60ML SFT PLIABLE BLB EZ TO GRP 1 HND USE W/

## (undated) DEVICE — SUTURE VCRL SZ 2-0 L27IN ABSRB UD L26MM CT-2 1/2 CIR J269H

## (undated) DEVICE — NEPTUNE E-SEP 165MM SUCTION SLEEVE: Brand: NEPTUNE E-SEP

## (undated) DEVICE — BLADE ES L6IN ELASTOMERIC COAT EXT DURABLE BEND UPTO 90DEG

## (undated) DEVICE — SUTURE VICRYL SZ 3-0 L54IN ABSRB UD LIGAPAK REEL POLYGLACTIN J285G

## (undated) DEVICE — BLADE,CARBON-STEEL,15,STRL,DISPOSABLE,TB: Brand: MEDLINE

## (undated) DEVICE — NEEDLE HYPO 25GA L1.5IN BLU POLYPR HUB S STL REG BVL STR

## (undated) DEVICE — SUTURE ABSORBABLE BRAIDED 0 CT-1 8X27 IN UD VICRYL JJ41G

## (undated) DEVICE — DRESSING BORDERED ADH GZ UNIV GEN USE 5IN 4IN AND 2 1/2IN

## (undated) DEVICE — MARKER,SKIN,WI/RULER AND LABELS: Brand: MEDLINE

## (undated) DEVICE — LIQUIBAND RAPID ADHESIVE 36/CS 0.8ML: Brand: MEDLINE

## (undated) DEVICE — NEPTUNE E-SEP SMOKE EVACUATION PENCIL, COATED, 70MM BLADE, PUSH BUTTON SWITCH: Brand: NEPTUNE E-SEP

## (undated) DEVICE — BLADELESS OBTURATOR: Brand: WECK VISTA

## (undated) DEVICE — DRAPE,REIN 53X77,STERILE: Brand: MEDLINE

## (undated) DEVICE — GLOVE SURG SZ 65 CRM LTX FREE POLYISOPRENE POLYMER BEAD ANTI

## (undated) DEVICE — 1LYRTR 16FR10ML100%SIL UMS SNP: Brand: MEDLINE INDUSTRIES, INC.

## (undated) DEVICE — SUTURE VCRL SZ 0 L27IN ABSRB UD L36MM CT-1 1/2 CIR J260H

## (undated) DEVICE — APPLIER LIG CLP M L11IN TI STR RNG HNDL FOR 20 CLP DISP

## (undated) DEVICE — PROTECTOR ULN NRV PUR FOAM HK LOOP STRP ANATOMICALLY

## (undated) DEVICE — COVER,MAYO STAND,STERILE: Brand: MEDLINE

## (undated) DEVICE — 4-PORT MANIFOLD: Brand: NEPTUNE 2

## (undated) DEVICE — AIRSEAL 12 MM ACCESS PORT AND PALM GRIP OBTURATOR WITH BLADELESS OPTICAL TIP, 120 MM LENGTH: Brand: AIRSEAL

## (undated) DEVICE — SOLUTION SCRB 4OZ 4% CHG H2O AIDED FOR PREOPERATIVE SKIN

## (undated) DEVICE — SUTURE PERMAHAND SZ 3-0 L30IN NONABSORBABLE BLK SH L26MM K832H

## (undated) DEVICE — KIT DRN FLAT W/ 100CC EVAC 7MM FULL PERF

## (undated) DEVICE — UNDERPANTS MAT L XL SEAMLESS CLR CODE WAISTBAND KNIT

## (undated) DEVICE — INSUFFLATION NEEDLE TO ESTABLISH PNEUMOPERITONEUM.: Brand: INSUFFLATION NEEDLE

## (undated) DEVICE — Device

## (undated) DEVICE — TIP COVER ACCESSORY

## (undated) DEVICE — SKIN PREP TRAY W/CHG: Brand: MEDLINE INDUSTRIES, INC.

## (undated) DEVICE — SEAL

## (undated) DEVICE — SPONGE LAP W18XL18IN WHT COT 4 PLY FLD STRUNG RADPQ DISP ST 2 PER PACK

## (undated) DEVICE — YANKAUER,BULB TIP,W/O VENT,RIGID,STERILE: Brand: MEDLINE

## (undated) DEVICE — YANKAUER,POOLE TIP,STERILE,50/CS: Brand: MEDLINE

## (undated) DEVICE — BLADE ES ELASTOMERIC COAT INSUL DURABLE BEND UPTO 90DEG

## (undated) DEVICE — TISSUE RETRIEVAL SYSTEM: Brand: INZII RETRIEVAL SYSTEM

## (undated) DEVICE — LIGHT SUCT UNTETHERED SCINTILLANT

## (undated) DEVICE — TRI-LUMEN FILTERED TUBE SET WITH ACTIVATED CHARCOAL FILTER: Brand: AIRSEAL

## (undated) DEVICE — APPLICATOR ENDOSCP L34CM W/ S STL CANN PLAS OBT STYL FOR

## (undated) DEVICE — GAUZE,SPONGE,FLUFF,6"X6.75",STRL,5/TRAY: Brand: MEDLINE

## (undated) DEVICE — GLOVE SURG SZ 65 THK91MIL LTX FREE SYN POLYISOPRENE

## (undated) DEVICE — ELECTRODE PT RET AD L9FT HI MOIST COND ADH HYDRGEL CORDED

## (undated) DEVICE — TUBING, SUCTION, 9/32" X 20', STRAIGHT: Brand: MEDLINE INDUSTRIES, INC.

## (undated) DEVICE — DRAPE,UNDRBUT,WHT GRAD PCH,CAPPORT,20/CS: Brand: MEDLINE

## (undated) DEVICE — APPLICATOR MEDICATED 26 CC SOLUTION HI LT ORNG CHLORAPREP

## (undated) DEVICE — SVMMC GYN ROBOTIC PK

## (undated) DEVICE — BLANKET WRM W40.2XL55.9IN IORT LO BODY + MISTRAL AIR

## (undated) DEVICE — SYRINGE MED 10ML LUERLOCK TIP W/O SFTY DISP

## (undated) DEVICE — AGENT HEMOSTATIC SURGIFLOW MATRIX KIT W/THROMBIN

## (undated) DEVICE — STAZ MAJOR BASIN: Brand: MEDLINE INDUSTRIES, INC.

## (undated) DEVICE — PAD,SANITARY,11 IN,MAXI,W/WINGS,N-STRL: Brand: MEDLINE

## (undated) DEVICE — SYRINGE, LUER LOCK, 10ML: Brand: MEDLINE

## (undated) DEVICE — SUTURE VICRYL SZ 3-0 L36IN ABSRB UD L36MM CT-1 1/2 CIR J944H

## (undated) DEVICE — COUNTER NDL 10 COUNT HLD 20 FOAM BLK SGL MAG

## (undated) DEVICE — GOWN,AURORA,NONREINFORCED,LARGE: Brand: MEDLINE

## (undated) DEVICE — BLADE,CARBON-STEEL,10,STRL,DISPOSABLE,TB: Brand: MEDLINE

## (undated) DEVICE — GOWN,SIRUS,NONRNF,SETINSLV,XL,20/CS: Brand: MEDLINE

## (undated) DEVICE — BINDER ABD UNISX 3 PNL E PREM CNTCT CLSR L XL 46INX62INX9IN

## (undated) DEVICE — TROCAR: Brand: KII FIOS FIRST ENTRY

## (undated) DEVICE — GLOVE ORANGE PI 7   MSG9070

## (undated) DEVICE — PROVE COVER: Brand: UNBRANDED

## (undated) DEVICE — DRAPE, SLUSH XL, 44X66, STERILE: Brand: MEDLINE

## (undated) DEVICE — DEVICE TRCR 12X9X3IN WHT CLSR DISP OMNICLOSE

## (undated) DEVICE — SOLUTION ANTIFOG VIS SYS CLEARIFY LAPSCP

## (undated) DEVICE — PAD PT POS 36 IN SURGYPAD DISP

## (undated) DEVICE — SYRINGE,CONTROL,LL,FINGER,GRIP: Brand: MEDLINE INDUSTRIES, INC.

## (undated) DEVICE — 3M™ IOBAN™ 2 ANTIMICROBIAL INCISE DRAPE 6650EZ: Brand: IOBAN™ 2

## (undated) DEVICE — AGENT HEMSTAT W2XL4IN OXIDIZED REGENERATED CELOS ABSRB

## (undated) DEVICE — APPLIER CLP L9.375IN APER 2.1MM CLS L3.8MM 20 SM TI CLP

## (undated) DEVICE — COVER OR TBL W40XL90IN ABSRB STD AND GRIPPY BK SAHARA

## (undated) DEVICE — SUTURE PDS II SZ 1 L96IN ABSRB VLT TP-1 L65MM 1/2 CIR Z880G

## (undated) DEVICE — ELECTRO LUBE IS A SINGLE PATIENT USE DEVICE THAT IS INTENDED TO BE USED ON ELECTROSURGICAL ELECTRODES TO REDUCE STICKING.: Brand: KEY SURGICAL ELECTRO LUBE

## (undated) DEVICE — LIGASURE IMPACT FT10 COMPATIBLE: Brand: MEDLINE

## (undated) DEVICE — CATHETER,URETHRAL,REDRUBBER,STRL,16FR: Brand: MEDLINE

## (undated) DEVICE — PACK PROCEDURE SURG FACILITY SPEC GI BWL SPT SVMMC

## (undated) DEVICE — ARM DRAPE

## (undated) DEVICE — DRESSING BORDERED ADH GZ UNIV GEN USE 8INX4IN AND 6INX2IN

## (undated) DEVICE — LOOP VES W25MM THK1MM MAXI RED SIL FLD REPELLENT 100 PER

## (undated) DEVICE — SUTURE MCRYL SZ 4-0 L18IN ABSRB UD L16MM PC-3 3/8 CIR PRIM Y845G

## (undated) DEVICE — TROCAR: Brand: KII® SLEEVE

## (undated) DEVICE — CLIP LIG M TI 6 SIL HNDL FOR OPN AND ENDOSCP SGL APPL

## (undated) DEVICE — SYRINGE CATH TIP 50ML

## (undated) DEVICE — CONTAINER,SPECIMEN,4OZ,OR STRL: Brand: MEDLINE

## (undated) DEVICE — TOWEL,OR,DSP,ST,NATURAL,DLX,4/PK,20PK/CS: Brand: MEDLINE